# Patient Record
Sex: FEMALE | Race: ASIAN | NOT HISPANIC OR LATINO | URBAN - METROPOLITAN AREA
[De-identification: names, ages, dates, MRNs, and addresses within clinical notes are randomized per-mention and may not be internally consistent; named-entity substitution may affect disease eponyms.]

---

## 2023-05-09 ENCOUNTER — EMERGENCY (EMERGENCY)
Facility: HOSPITAL | Age: 83
LOS: 1 days | Discharge: HOME | End: 2023-05-09
Attending: EMERGENCY MEDICINE | Admitting: EMERGENCY MEDICINE
Payer: MEDICARE

## 2023-05-09 VITALS
DIASTOLIC BLOOD PRESSURE: 71 MMHG | OXYGEN SATURATION: 95 % | HEART RATE: 72 BPM | TEMPERATURE: 98 F | RESPIRATION RATE: 16 BRPM | SYSTOLIC BLOOD PRESSURE: 145 MMHG

## 2023-05-09 VITALS
OXYGEN SATURATION: 94 % | HEART RATE: 69 BPM | WEIGHT: 128.09 LBS | RESPIRATION RATE: 18 BRPM | SYSTOLIC BLOOD PRESSURE: 163 MMHG | DIASTOLIC BLOOD PRESSURE: 83 MMHG | TEMPERATURE: 100 F

## 2023-05-09 DIAGNOSIS — Z87.39 PERSONAL HISTORY OF OTHER DISEASES OF THE MUSCULOSKELETAL SYSTEM AND CONNECTIVE TISSUE: ICD-10-CM

## 2023-05-09 DIAGNOSIS — S32.049A UNSPECIFIED FRACTURE OF FOURTH LUMBAR VERTEBRA, INITIAL ENCOUNTER FOR CLOSED FRACTURE: ICD-10-CM

## 2023-05-09 DIAGNOSIS — S32.019A UNSPECIFIED FRACTURE OF FIRST LUMBAR VERTEBRA, INITIAL ENCOUNTER FOR CLOSED FRACTURE: ICD-10-CM

## 2023-05-09 DIAGNOSIS — F32.A DEPRESSION, UNSPECIFIED: ICD-10-CM

## 2023-05-09 DIAGNOSIS — M54.50 LOW BACK PAIN, UNSPECIFIED: ICD-10-CM

## 2023-05-09 DIAGNOSIS — E03.9 HYPOTHYROIDISM, UNSPECIFIED: ICD-10-CM
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
## 2023-05-09 DIAGNOSIS — R32 UNSPECIFIED URINARY INCONTINENCE: ICD-10-CM
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
## 2023-05-09 DIAGNOSIS — R10.2 PELVIC AND PERINEAL PAIN: ICD-10-CM

## 2023-05-09 DIAGNOSIS — G47.00 INSOMNIA, UNSPECIFIED: ICD-10-CM
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
## 2023-05-09 DIAGNOSIS — R50.9 FEVER, UNSPECIFIED: ICD-10-CM

## 2023-05-09 DIAGNOSIS — Z87.440 PERSONAL HISTORY OF URINARY (TRACT) INFECTIONS: ICD-10-CM

## 2023-05-09 LAB
ALBUMIN SERPL ELPH-MCNC: 3.3 G/DL — SIGNIFICANT CHANGE UP (ref 3.3–5)
ALP SERPL-CCNC: 89 U/L — SIGNIFICANT CHANGE UP (ref 40–120)
ALT FLD-CCNC: 27 U/L — SIGNIFICANT CHANGE UP (ref 10–45)
ANION GAP SERPL CALC-SCNC: 9 MMOL/L — SIGNIFICANT CHANGE UP (ref 5–17)
APPEARANCE UR: ABNORMAL
APTT BLD: 29.2 SEC — SIGNIFICANT CHANGE UP (ref 27.5–35.5)
AST SERPL-CCNC: 18 U/L — SIGNIFICANT CHANGE UP (ref 10–40)
BACTERIA # UR AUTO: ABNORMAL /HPF
BASOPHILS # BLD AUTO: 0.03 K/UL — SIGNIFICANT CHANGE UP (ref 0–0.2)
BASOPHILS NFR BLD AUTO: 0.3 % — SIGNIFICANT CHANGE UP (ref 0–2)
BILIRUB SERPL-MCNC: 0.5 MG/DL — SIGNIFICANT CHANGE UP (ref 0.2–1.2)
BILIRUB UR-MCNC: NEGATIVE — SIGNIFICANT CHANGE UP
BUN SERPL-MCNC: 10 MG/DL — SIGNIFICANT CHANGE UP (ref 7–23)
CALCIUM SERPL-MCNC: 9.2 MG/DL — SIGNIFICANT CHANGE UP (ref 8.4–10.5)
CHLORIDE SERPL-SCNC: 106 MMOL/L — SIGNIFICANT CHANGE UP (ref 96–108)
CO2 SERPL-SCNC: 24 MMOL/L — SIGNIFICANT CHANGE UP (ref 22–31)
COLOR SPEC: YELLOW — SIGNIFICANT CHANGE UP
COMMENT - URINE: SIGNIFICANT CHANGE UP
CREAT SERPL-MCNC: 0.56 MG/DL — SIGNIFICANT CHANGE UP (ref 0.5–1.3)
DIFF PNL FLD: ABNORMAL
EGFR: 92 ML/MIN/1.73M2 — SIGNIFICANT CHANGE UP
EOSINOPHIL # BLD AUTO: 0.01 K/UL — SIGNIFICANT CHANGE UP (ref 0–0.5)
EOSINOPHIL NFR BLD AUTO: 0.1 % — SIGNIFICANT CHANGE UP (ref 0–6)
EPI CELLS # UR: SIGNIFICANT CHANGE UP /HPF (ref 0–5)
GLUCOSE SERPL-MCNC: 173 MG/DL — HIGH (ref 70–99)
GLUCOSE UR QL: NEGATIVE — SIGNIFICANT CHANGE UP
HCT VFR BLD CALC: 37.2 % — SIGNIFICANT CHANGE UP (ref 34.5–45)
HGB BLD-MCNC: 12.7 G/DL — SIGNIFICANT CHANGE UP (ref 11.5–15.5)
IMM GRANULOCYTES NFR BLD AUTO: 0.7 % — SIGNIFICANT CHANGE UP (ref 0–0.9)
INR BLD: 0.97 — SIGNIFICANT CHANGE UP (ref 0.88–1.16)
KETONES UR-MCNC: NEGATIVE — SIGNIFICANT CHANGE UP
LEUKOCYTE ESTERASE UR-ACNC: ABNORMAL
LYMPHOCYTES # BLD AUTO: 0.96 K/UL — LOW (ref 1–3.3)
LYMPHOCYTES # BLD AUTO: 10.7 % — LOW (ref 13–44)
MCHC RBC-ENTMCNC: 28.7 PG — SIGNIFICANT CHANGE UP (ref 27–34)
MCHC RBC-ENTMCNC: 34.1 GM/DL — SIGNIFICANT CHANGE UP (ref 32–36)
MCV RBC AUTO: 84.2 FL — SIGNIFICANT CHANGE UP (ref 80–100)
MONOCYTES # BLD AUTO: 0.44 K/UL — SIGNIFICANT CHANGE UP (ref 0–0.9)
MONOCYTES NFR BLD AUTO: 4.9 % — SIGNIFICANT CHANGE UP (ref 2–14)
NEUTROPHILS # BLD AUTO: 7.45 K/UL — HIGH (ref 1.8–7.4)
NEUTROPHILS NFR BLD AUTO: 83.3 % — HIGH (ref 43–77)
NITRITE UR-MCNC: POSITIVE
NRBC # BLD: 0 /100 WBCS — SIGNIFICANT CHANGE UP (ref 0–0)
PH UR: 6 — SIGNIFICANT CHANGE UP (ref 5–8)
PLATELET # BLD AUTO: 159 K/UL — SIGNIFICANT CHANGE UP (ref 150–400)
POTASSIUM SERPL-MCNC: 3.9 MMOL/L — SIGNIFICANT CHANGE UP (ref 3.5–5.3)
POTASSIUM SERPL-SCNC: 3.9 MMOL/L — SIGNIFICANT CHANGE UP (ref 3.5–5.3)
PROT SERPL-MCNC: 6.5 G/DL — SIGNIFICANT CHANGE UP (ref 6–8.3)
PROT UR-MCNC: 30 MG/DL
PROTHROM AB SERPL-ACNC: 11.5 SEC — SIGNIFICANT CHANGE UP (ref 10.5–13.4)
RBC # BLD: 4.42 M/UL — SIGNIFICANT CHANGE UP (ref 3.8–5.2)
RBC # FLD: 13.1 % — SIGNIFICANT CHANGE UP (ref 10.3–14.5)
RBC CASTS # UR COMP ASSIST: < 5 /HPF — SIGNIFICANT CHANGE UP
SODIUM SERPL-SCNC: 139 MMOL/L — SIGNIFICANT CHANGE UP (ref 135–145)
SP GR SPEC: 1.02 — SIGNIFICANT CHANGE UP (ref 1–1.03)
UROBILINOGEN FLD QL: 0.2 E.U./DL — SIGNIFICANT CHANGE UP
WBC # BLD: 8.95 K/UL — SIGNIFICANT CHANGE UP (ref 3.8–10.5)
WBC # FLD AUTO: 8.95 K/UL — SIGNIFICANT CHANGE UP (ref 3.8–10.5)
WBC UR QL: ABNORMAL /HPF

## 2023-05-09 PROCEDURE — 87086 URINE CULTURE/COLONY COUNT: CPT

## 2023-05-09 PROCEDURE — 73523 X-RAY EXAM HIPS BI 5/> VIEWS: CPT | Mod: 26

## 2023-05-09 PROCEDURE — 36415 COLL VENOUS BLD VENIPUNCTURE: CPT

## 2023-05-09 PROCEDURE — 96374 THER/PROPH/DIAG INJ IV PUSH: CPT

## 2023-05-09 PROCEDURE — 87184 SC STD DISK METHOD PER PLATE: CPT

## 2023-05-09 PROCEDURE — 93005 ELECTROCARDIOGRAM TRACING: CPT

## 2023-05-09 PROCEDURE — 72220 X-RAY EXAM SACRUM TAILBONE: CPT

## 2023-05-09 PROCEDURE — 80053 COMPREHEN METABOLIC PANEL: CPT

## 2023-05-09 PROCEDURE — 72100 X-RAY EXAM L-S SPINE 2/3 VWS: CPT | Mod: 26

## 2023-05-09 PROCEDURE — 85730 THROMBOPLASTIN TIME PARTIAL: CPT

## 2023-05-09 PROCEDURE — 99284 EMERGENCY DEPT VISIT MOD MDM: CPT

## 2023-05-09 PROCEDURE — 73523 X-RAY EXAM HIPS BI 5/> VIEWS: CPT

## 2023-05-09 PROCEDURE — 72100 X-RAY EXAM L-S SPINE 2/3 VWS: CPT

## 2023-05-09 PROCEDURE — 85610 PROTHROMBIN TIME: CPT

## 2023-05-09 PROCEDURE — 82962 GLUCOSE BLOOD TEST: CPT

## 2023-05-09 PROCEDURE — 96375 TX/PRO/DX INJ NEW DRUG ADDON: CPT

## 2023-05-09 PROCEDURE — 87186 SC STD MICRODIL/AGAR DIL: CPT

## 2023-05-09 PROCEDURE — 72220 X-RAY EXAM SACRUM TAILBONE: CPT | Mod: 26

## 2023-05-09 PROCEDURE — 99285 EMERGENCY DEPT VISIT HI MDM: CPT | Mod: 25

## 2023-05-09 PROCEDURE — 85025 COMPLETE CBC W/AUTO DIFF WBC: CPT

## 2023-05-09 PROCEDURE — 81001 URINALYSIS AUTO W/SCOPE: CPT

## 2023-05-09 RX ORDER — CEFPODOXIME PROXETIL 100 MG
1 TABLET ORAL
Qty: 20 | Refills: 0
Start: 2023-05-09 | End: 2023-05-18

## 2023-05-09 RX ORDER — KETOROLAC TROMETHAMINE 30 MG/ML
15 SYRINGE (ML) INJECTION ONCE
Refills: 0 | Status: DISCONTINUED | OUTPATIENT
Start: 2023-05-09 | End: 2023-05-09

## 2023-05-09 RX ORDER — CEFTRIAXONE 500 MG/1
1000 INJECTION, POWDER, FOR SOLUTION INTRAMUSCULAR; INTRAVENOUS ONCE
Refills: 0 | Status: COMPLETED | OUTPATIENT
Start: 2023-05-09 | End: 2023-05-09

## 2023-05-09 RX ADMIN — Medication 15 MILLIGRAM(S): at 14:40

## 2023-05-09 RX ADMIN — CEFTRIAXONE 100 MILLIGRAM(S): 500 INJECTION, POWDER, FOR SOLUTION INTRAMUSCULAR; INTRAVENOUS at 17:05

## 2023-05-09 RX ADMIN — Medication 15 MILLIGRAM(S): at 14:14

## 2023-05-09 NOTE — ED PROVIDER NOTE - CLINICAL SUMMARY MEDICAL DECISION MAKING FREE TEXT BOX
Pt presents s/p mechanical fall last night, p/w buttock / tailbone pain. No AC use. No other complaints nor signs of trauma. Low grade fever on arrival to ED. Denies infectious sx, although notes she was sweating this morning at home, pt thinks 2/2 pain. No known fever at home. Rectal temp in the ED 99.9. Baseline urinary incontinence, uses diaper, risk for UTI. Check labs, UA, XR, analgesia. Dispo pending w/u and clinical status

## 2023-05-09 NOTE — ED PROVIDER NOTE - NSFOLLOWUPCLINICS_GEN_ALL_ED_FT
[facility]  Family Medicine  [address], [address]  [place], [place] [address]  Phone: [phone]  Fax:

## 2023-05-09 NOTE — ED ADULT NURSE REASSESSMENT NOTE - NS ED NURSE REASSESS COMMENT FT1
Pt did ambulation trial. pt was able to stand with three people assist. Pt unable to tolerate ambulation after two steps and pushed back. Pt had extremely unstable gait. Pt did endorse 8/10 pain with activity, MD Buchanan notified and aware. MD at bedside during trial. Pt put back into bed with side rail up and bed locked to lowest position. Ot still A&Ox4 and able to speak in complete sentences. Pt breathing even and unlabored with equal chest rise and fall.

## 2023-05-09 NOTE — ED ADULT NURSE NOTE - OBJECTIVE STATEMENT
Pt arrived accompanied by son. pt endorsed that 5/6/2023 pt took her prescribed daily ambien and was cleaning a table. during the clean pt had a fall and landed on her tailbone. pt denies LOC, landing on head, and injuring other parts of her body. per son pt has been couch ridden since and unable to ambulate due to extreme pain 10/10. pt denies cp, sob, n, v, lightheadedness, dizziness, blurry vision. Pt A&Ox4. Able to speak in complete sentences. Pt lung sounds expiratory wheeze. pt upper body extremity strength 4/5. pt RLE 3/5 w/ ability to fully extend leg. pt LLE externally rotated and pt unable to fully extend leg and shouting with pain. pt also endorsed being on abx for chronic UTI.

## 2023-05-09 NOTE — ED PROVIDER NOTE - NS ED MD DISPO DISCHARGE CCDA
steady gait NAD/Alert and oriented to person, place and time
Patient/Caregiver provided printed discharge information.

## 2023-05-09 NOTE — ED PROVIDER NOTE - PHYSICAL EXAMINATION
Constitutional: Well appearing, well nourished, awake, alert, oriented to person, place, time/situation and in no apparent distress.  Head atraumatic, normocephalic. No signs of trauma  ENMT: Airway patent. Normal MM. NO Hudson signs nor Raccoon's eyes  Eyes: Clear bilaterally, PERRL, EOMI  Chest: no trauma  Cardiac: Normal rate, regular rhythm.  Heart sounds S1, S2.  No murmurs, rubs or gallops.  Respiratory: Breaths sounds equal and clear b/l. No increased WOB, tachypnea, hypoxia, or accessory mm use. Pt speaks in full sentences.   Gastrointestinal: Abd soft, NT, ND, NABS. No guarding, rebound, or rigidity. No pulsatile abdominal masses. No organomegaly appreciated. no trauma  Musculoskeletal: No midline spinal ttp throughout the spine. FROM neck w/o midline pain. Pelvis stable. + mild midline ttp lower lumbar / sacral / coccygeal. FROM all joints x 4 ext w/o dec ROM, or signs of trauma. Reports b/l pelvic / groin pain w/ ROM legs / hips, w/o dec ROM  Neuro: Alert and oriented x 3, face symmetric. Strength 5/5 x 4 ext and symmetric, nml gross motor movement, nml gait. No focal deficits noted.   Skin: Skin normal color for race, warm, dry and intact. No evidence of rash.  Psych: Alert and oriented to person, place, time/situation. normal mood and affect.

## 2023-05-09 NOTE — ED PROVIDER NOTE - PATIENT PORTAL LINK FT
You can access the FollowMyHealth Patient Portal offered by [facility] by registering at the following website: [email or web address]. By joining [facility]’s FollowMyHealth portal, you will also be able to view your health information using other applications (apps) compatible with our system.

## 2023-05-09 NOTE — ED ADULT NURSE REASSESSMENT NOTE - NS ED NURSE REASSESS COMMENT FT1
Pt pain improved post toradol administration. Pt able to move BLE 4/5 w/o screaming in pain. Pt A&ox4 and able to speak in complete sentences. breathing even and unlabored with equal chest rise and fall. bed locked in lowest position, call light in reach, fall precautions still being implemented. Pending further lab results at this time.

## 2023-05-09 NOTE — ED PROVIDER NOTE - NSFOLLOWUPINSTRUCTIONS_ED_ALL_ED_FT
You were evaluated in the ED after a fall. You have compression fractures in the lumbar spine. This is treated with conservative management (pain control, weight bearing as tolerated). You can alternate Tylenol, with Motrin, staggering the two medications every 3 hours. For example, Tylenol at 9am, followed by Motrin at 12pm, followed by Tylenol at 3pm, etc.     You also have a urinary tract infection. The treatment is antibiotics. You received the first dose here in the ED, in the IV (Ceftriaxone), and a prescription has been sent to your pharmacy, to take over the next 10 days (Cefpodoxime, both similar to Penicillin). You may start the prescription TOMORROW.    You were offered admission for pain control and possible rehab placement while you heal. You have declined admission. If you have uncontrolled pain, recurrent falls, or worsening / concerning symptoms, return to the ED for re-evaluation.     Spinal Compression Fracture    A spinal compression fracture is a collapse of the bones that form the spine (vertebrae). With this type of fracture, the vertebrae become pushed (compressed) into a wedge shape. Most compression fractures happen in the middle or lower part of the spine.    What are the causes?  This condition may be caused by:  Thinning and loss of density in the bones (osteoporosis). This is the most common cause.  A fall.  A car or motorcycle accident.  Cancer.  Trauma, such as a heavy, direct hit to the head or back.  What increases the risk?  You are more likely to develop this condition if:  You are 60 years of age or older.  You have osteoporosis.  You have certain types of cancer, including:  Multiple myeloma.  Lymphoma.  Prostate cancer.  Lung cancer.  Breast cancer.  What are the signs or symptoms?  Symptoms of this condition include:  Severe pain with simple movements such as coughing or sneezing.  Pain that gets worse over time.  Pain that is worse when you stand, walk, sit, or bend.  Sudden pain that is so bad that it is hard for you to move.  Bending or humping of the spine.  Gradual loss of height.  Numbness, tingling, or weakness in the back and legs.  Trouble walking.  Your symptoms will depend on the cause of the fracture and how quickly it develops.    How is this diagnosed?  This condition may be diagnosed based on symptoms, medical history, and a physical exam. During the physical exam, your health care provider may tap along the length of your spine to check for tenderness. Tests may be done to confirm the diagnosis. They may include:  A bone mineral density test to check for osteoporosis.  Imaging tests, such as a spine X-ray, CT scan, or MRI.  How is this treated?  Treatment depends on the cause and severity of the condition. Some fractures may heal on their own with supportive care. Treatment may include:  Pain medicine.  Rest.  A back brace.  Physical therapy exercises.  Medicine to strengthen bone.  Calcium and vitamin D supplements.  Fractures that cause the back to become misshapen, cause nerve pain or weakness, or do not respond to other treatment may be treated with surgery. This may include:  Vertebroplasty. Bone cement is injected into the collapsed vertebrae to stabilize them.  Balloon kyphoplasty. The collapsed vertebrae are expanded with a balloon and then bone cement is injected into them.  Spinal fusion. The collapsed vertebrae are connected (fused) to normal vertebrae.  Follow these instructions at home:  Medicines    Take over-the-counter and prescription medicines only as told by your health care provider.  Ask your health care provider if the medicine prescribed to you:  Requires you to avoid driving or using machinery.  Can cause constipation. You may need to take these actions to prevent or treat constipation:  Drink enough fluid to keep your urine pale yellow.  Take over-the-counter or prescription medicines.  Eat foods that are high in fiber, such as beans, whole grains, and fresh fruits and vegetables.  Limit foods that are high in fat and processed sugars, such as fried or sweet foods.  If you have a brace:    Wear the brace as told by your health care provider. Remove it only as told by your health care provider.  Loosen the brace if your fingers or toes tingle, become numb, or turn cold and blue.  Keep the brace clean.  If the brace is not waterproof:  Do not let it get wet.  Cover it with a watertight covering when you take a bath or a shower.  Managing pain, stiffness, and swelling      If directed, put ice on the injured area. To do this:  If you have a removable brace, remove it as told by your health care provider.  Put ice in a plastic bag.  Place a towel between your skin and the bag.  Leave the ice on for 20 minutes, 2–3 times a day.  Remove the ice if your skin turns bright red. This is very important. If you cannot feel pain, heat, or cold, you have a greater risk of damage to the area.  Activity    Rest as told by your health care provider.  Avoid sitting for a long time without moving. Get up to take short walks every 1–2 hours. This is important to improve blood flow and breathing. Ask for help if you feel weak or unsteady.  Return to your normal activities as told by your health care provider. Ask what activities are safe for you.  Do physical therapy exercises to improve movement and strength in your back, as recommended by your health care provider.  Exercise regularly as directed by your health care provider.  General instructions      Do not drink alcohol. Alcohol can interfere with your treatment.  Do not use any products that contain nicotine or tobacco, such as cigarettes, e-cigarettes, and chewing tobacco. These can delay bone healing. If you need help quitting, ask your health care provider.  Keep all follow-up visits. This is important. It can help to prevent permanent injury, disability, and long-lasting (chronic) pain.  Contact a health care provider if:  You have a fever.  Your pain medicine is not helping.  Your pain does not get better over time.  You cannot return to your normal activities as planned or expected.  Get help right away if:  Your pain is very bad and it suddenly gets worse.  You are unable to move any body part (paralysis) that is below the level of your injury.  You have numbness, tingling, or weakness in any body part that is below the level of your injury.  You cannot control your bladder or bowels.  Summary  A spinal compression fracture is a collapse of the bones that form the spine (vertebrae).  With this type of fracture, the vertebrae become pushed (compressed) into a wedge shape.  Your symptoms and treatment will depend on the cause and severity of the fracture and how quickly it develops.  Some fractures may heal on their own with supportive care. Fractures that cause the back to become misshapen, cause nerve pain or weakness, or do not respond to other treatment may be treated with surgery.  This information is not intended to replace advice given to you by your health care provider. Make sure you discuss any questions you have with your health care provider.    Urinary Tract Infection    A urinary tract infection (UTI) is an infection of any part of the urinary tract, which includes the kidneys, ureters, bladder, and urethra. Risk factors include ignoring your need to urinate, wiping back to front if female, being an uncircumcised male, and having diabetes or a weak immune system. Symptoms include frequent urination, pain or burning with urination, foul smelling urine, cloudy urine, pain in the lower abdomen, blood in the urine, and fever. If you were prescribed an antibiotic medicine, take it as told by your health care provider. Do not stop taking the antibiotic even if you start to feel better.    SEEK IMMEDIATE MEDICAL CARE IF YOU HAVE ANY OF THE FOLLOWING SYMPTOMS: severe back or abdominal pain, fever, inability to keep fluids or medicine down, dizziness/lightheadedness, or a change in mental status.

## 2023-05-09 NOTE — ED ADULT TRIAGE NOTE - CHIEF COMPLAINT QUOTE
BIBEMS, from home. As per son, patient fell in the house around 12AM last night, complaining right lower back pain. Denies loc or head injury. Patient currently on medication for a uti. Hx of hypothyroid , depression.

## 2023-05-09 NOTE — ED PROVIDER NOTE - OBJECTIVE STATEMENT
Pt w/ PMHx baseline urinary incontinence on methenamine, hypothyroidism on synthroid, ? early Alzheimer's on Donepezil, depression on Sertraline, insomnia on Ambien + Melatonin, PSHx L ankle surgery presents s/p "tailbone pain" s/p fall last night. She states she takes Ambien every night to help her sleep. At approx 2240 she took 5 mg and 2 Melatonin gummies, unknown dose. She states she was cleaning a table, split water, slipped on the water, and fell onto her tailbone. NO head inj or LOC. She was helped into bed. She was given Ibuprofen last night for pain. This morning she was in a lot of pain, received Tylenol 500 mg at approx 10 am, and no additional analgesia. She had sig pain prompting the ED visit. She normally walks with a cane. She has not bore weight today. No AC use

## 2023-05-11 LAB
-  AMPICILLIN/SULBACTAM: SIGNIFICANT CHANGE UP
-  AMPICILLIN: SIGNIFICANT CHANGE UP
-  CEFAZOLIN: SIGNIFICANT CHANGE UP
-  CEFTRIAXONE: SIGNIFICANT CHANGE UP
-  CIPROFLOXACIN: SIGNIFICANT CHANGE UP
-  ERTAPENEM: SIGNIFICANT CHANGE UP
-  GENTAMICIN: SIGNIFICANT CHANGE UP
-  NITROFURANTOIN: SIGNIFICANT CHANGE UP
-  PIPERACILLIN/TAZOBACTAM: SIGNIFICANT CHANGE UP
-  TOBRAMYCIN: SIGNIFICANT CHANGE UP
-  TRIMETHOPRIM/SULFAMETHOXAZOLE: SIGNIFICANT CHANGE UP
METHOD TYPE: SIGNIFICANT CHANGE UP
METHOD TYPE: SIGNIFICANT CHANGE UP

## 2023-05-12 LAB
-  CEFAZOLIN: SIGNIFICANT CHANGE UP
CULTURE RESULTS: SIGNIFICANT CHANGE UP
METHOD TYPE: SIGNIFICANT CHANGE UP
METHOD TYPE: SIGNIFICANT CHANGE UP
ORGANISM # SPEC MICROSCOPIC CNT: SIGNIFICANT CHANGE UP
SPECIMEN SOURCE: SIGNIFICANT CHANGE UP

## 2023-08-02 PROBLEM — N39.0 URINARY TRACT INFECTION, SITE NOT SPECIFIED: Chronic | Status: ACTIVE | Noted: 2023-05-09

## 2023-09-01 ENCOUNTER — INPATIENT (INPATIENT)
Facility: HOSPITAL | Age: 83
LOS: 4 days | Discharge: NURSING, REHAB OR LONG-TERM CARE | DRG: 521 | End: 2023-09-06
Attending: ORTHOPAEDIC SURGERY | Admitting: ORTHOPAEDIC SURGERY
Payer: MEDICARE

## 2023-09-01 VITALS
HEART RATE: 89 BPM | RESPIRATION RATE: 18 BRPM | DIASTOLIC BLOOD PRESSURE: 88 MMHG | SYSTOLIC BLOOD PRESSURE: 178 MMHG | WEIGHT: 121.92 LBS | TEMPERATURE: 98 F | OXYGEN SATURATION: 94 %

## 2023-09-01 DIAGNOSIS — M25.552 PAIN IN LEFT HIP: ICD-10-CM

## 2023-09-01 DIAGNOSIS — D62 ACUTE POSTHEMORRHAGIC ANEMIA: ICD-10-CM

## 2023-09-01 DIAGNOSIS — G93.41 METABOLIC ENCEPHALOPATHY: ICD-10-CM

## 2023-09-01 DIAGNOSIS — E87.1 HYPO-OSMOLALITY AND HYPONATREMIA: ICD-10-CM

## 2023-09-01 DIAGNOSIS — F41.9 ANXIETY DISORDER, UNSPECIFIED: ICD-10-CM

## 2023-09-01 DIAGNOSIS — Z79.890 HORMONE REPLACEMENT THERAPY: ICD-10-CM
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
## 2023-09-01 DIAGNOSIS — R01.1 CARDIAC MURMUR, UNSPECIFIED: ICD-10-CM

## 2023-09-01 DIAGNOSIS — E03.9 HYPOTHYROIDISM, UNSPECIFIED: ICD-10-CM

## 2023-09-01 DIAGNOSIS — S72.002A FRACTURE OF UNSPECIFIED PART OF NECK OF LEFT FEMUR, INITIAL ENCOUNTER FOR CLOSED FRACTURE: ICD-10-CM
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
## 2023-09-01 DIAGNOSIS — R03.0 ELEVATED BLOOD-PRESSURE READING, WITHOUT DIAGNOSIS OF HYPERTENSION: ICD-10-CM

## 2023-09-01 DIAGNOSIS — M81.0 AGE-RELATED OSTEOPOROSIS WITHOUT CURRENT PATHOLOGICAL FRACTURE: ICD-10-CM

## 2023-09-01 LAB
ANION GAP SERPL CALC-SCNC: 10 MMOL/L — SIGNIFICANT CHANGE UP (ref 5–17)
APTT BLD: 31.8 SEC — SIGNIFICANT CHANGE UP (ref 24.5–35.6)
BLD GP AB SCN SERPL QL: NEGATIVE — SIGNIFICANT CHANGE UP
BUN SERPL-MCNC: 7 MG/DL — SIGNIFICANT CHANGE UP (ref 7–23)
CALCIUM SERPL-MCNC: 9.8 MG/DL — SIGNIFICANT CHANGE UP (ref 8.4–10.5)
CHLORIDE SERPL-SCNC: 98 MMOL/L — SIGNIFICANT CHANGE UP (ref 96–108)
CO2 SERPL-SCNC: 24 MMOL/L — SIGNIFICANT CHANGE UP (ref 22–31)
CREAT SERPL-MCNC: 0.49 MG/DL — LOW (ref 0.5–1.3)
EGFR: 94 ML/MIN/1.73M2 — SIGNIFICANT CHANGE UP
GLUCOSE SERPL-MCNC: 162 MG/DL — HIGH (ref 70–99)
HCT VFR BLD CALC: 35.9 % — SIGNIFICANT CHANGE UP (ref 34.5–45)
HGB BLD-MCNC: 12.1 G/DL — SIGNIFICANT CHANGE UP (ref 11.5–15.5)
INR BLD: 0.86 — SIGNIFICANT CHANGE UP (ref 0.85–1.18)
MCHC RBC-ENTMCNC: 29.2 PG — SIGNIFICANT CHANGE UP (ref 27–34)
MCHC RBC-ENTMCNC: 33.7 GM/DL — SIGNIFICANT CHANGE UP (ref 32–36)
MCV RBC AUTO: 86.7 FL — SIGNIFICANT CHANGE UP (ref 80–100)
NRBC # BLD: 0 /100 WBCS — SIGNIFICANT CHANGE UP (ref 0–0)
PLATELET # BLD AUTO: 222 K/UL — SIGNIFICANT CHANGE UP (ref 150–400)
POTASSIUM SERPL-MCNC: 3.9 MMOL/L — SIGNIFICANT CHANGE UP (ref 3.5–5.3)
POTASSIUM SERPL-SCNC: 3.9 MMOL/L — SIGNIFICANT CHANGE UP (ref 3.5–5.3)
PROTHROM AB SERPL-ACNC: 9.9 SEC — SIGNIFICANT CHANGE UP (ref 9.5–13)
RBC # BLD: 4.14 M/UL — SIGNIFICANT CHANGE UP (ref 3.8–5.2)
RBC # FLD: 12.9 % — SIGNIFICANT CHANGE UP (ref 10.3–14.5)
RH IG SCN BLD-IMP: POSITIVE — SIGNIFICANT CHANGE UP
SODIUM SERPL-SCNC: 132 MMOL/L — LOW (ref 135–145)
WBC # BLD: 7.09 K/UL — SIGNIFICANT CHANGE UP (ref 3.8–10.5)
WBC # FLD AUTO: 7.09 K/UL — SIGNIFICANT CHANGE UP (ref 3.8–10.5)

## 2023-09-01 PROCEDURE — 71045 X-RAY EXAM CHEST 1 VIEW: CPT | Mod: 26

## 2023-09-01 PROCEDURE — 99285 EMERGENCY DEPT VISIT HI MDM: CPT

## 2023-09-01 PROCEDURE — 73502 X-RAY EXAM HIP UNI 2-3 VIEWS: CPT | Mod: 26,LT

## 2023-09-01 PROCEDURE — 99223 1ST HOSP IP/OBS HIGH 75: CPT

## 2023-09-01 PROCEDURE — 99222 1ST HOSP IP/OBS MODERATE 55: CPT

## 2023-09-01 PROCEDURE — 73552 X-RAY EXAM OF FEMUR 2/>: CPT | Mod: 26,LT

## 2023-09-01 RX ORDER — MORPHINE SULFATE 50 MG/1
4 CAPSULE, EXTENDED RELEASE ORAL ONCE
Refills: 0 | Status: DISCONTINUED | OUTPATIENT
Start: 2023-09-01 | End: 2023-09-01

## 2023-09-01 RX ORDER — KETOROLAC TROMETHAMINE 30 MG/ML
15 SYRINGE (ML) INJECTION ONCE
Refills: 0 | Status: DISCONTINUED | OUTPATIENT
Start: 2023-09-01 | End: 2023-09-01

## 2023-09-01 RX ORDER — SODIUM CHLORIDE 9 MG/ML
500 INJECTION, SOLUTION INTRAVENOUS
Refills: 0 | Status: DISCONTINUED | OUTPATIENT
Start: 2023-09-01 | End: 2023-09-01

## 2023-09-01 RX ADMIN — SODIUM CHLORIDE 70 MILLILITER(S): 9 INJECTION, SOLUTION INTRAVENOUS at 23:37

## 2023-09-01 RX ADMIN — Medication 15 MILLIGRAM(S): at 21:38

## 2023-09-01 RX ADMIN — MORPHINE SULFATE 4 MILLIGRAM(S): 50 CAPSULE, EXTENDED RELEASE ORAL at 21:00

## 2023-09-01 NOTE — H&P ADULT - HISTORY OF PRESENT ILLNESS
83F otherwise no PMH presenting with left femoral neck fracture Garden IV s/p mechanical fall down the hill today. Denies head strike/loss of consciousness.

## 2023-09-01 NOTE — ED PROVIDER NOTE - OBJECTIVE STATEMENT
84 yo F w no PMH p/w L hip pain s/p mechanical fall today. Pt was walking downhill and became unsteady, fell to her L side landing on her hip. No head injury, no LOC. She was unable to stand after fall. EMS was called and brought her in from the scene of fall. She is not on blood thinners. In pain in the ED, not moving hip.

## 2023-09-01 NOTE — ED ADULT NURSE NOTE - NSFALLRISKINTERV_ED_ALL_ED
Assistance OOB with selected safe patient handling equipment if applicable/Assistance with ambulation/Communicate fall risk and risk factors to all staff, patient, and family/Monitor gait and stability/Provide visual cue: yellow wristband, yellow gown, etc/Reinforce activity limits and safety measures with patient and family/Call bell, personal items and telephone in reach/Instruct patient to call for assistance before getting out of bed/chair/stretcher/Non-slip footwear applied when patient is off stretcher/[place] to call system/Physically safe environment - no spills, clutter or unnecessary equipment/Purposeful Proactive Rounding/Room/bathroom lighting operational, light cord in reach

## 2023-09-01 NOTE — ED ADULT NURSE NOTE - OBJECTIVE STATEMENT
Pt is 83F BIBEMS c/o L hip pain s/p mechanical trip and fall. Circulation, motor function, sensation intact to BLEs, +2 DP pulses bilaterally. Pt laying on R side unable to put pressure on L hip. Skin intact over L hip area

## 2023-09-01 NOTE — ED PROVIDER NOTE - NS ED ROS FT
CONSTITUTIONAL: No fever, no chills, no fatigue  EYES: No eye redness, no visual changes  ENT: No ear pain, no sore throat  CARDIOVASCULAR: No chest pain, no palpitations  RESPIRATORY: No cough, no SOB  GI: No abdominal pain, no nausea, no vomiting, no constipation, no diarrhea  GENITOURINARY: No dysuria, no frequency, no hematuria  MUSCULOSKELETAL: No back pain, + hip pain, no myalgias  SKIN: No rash, no peripheral edema  NEURO: No headache, no confusion    ALL OTHER SYSTEMS NEGATIVE.

## 2023-09-01 NOTE — CONSULT NOTE ADULT - SUBJECTIVE AND OBJECTIVE BOX
Patient is a 83y old  Female who presents with a chief complaint of L femoral neck fracture (01 Sep 2023 22:55)      HPI:  83F otherwise no PMH presenting with left femoral neck fracture Garden IV s/p mechanical fall down the hill today. Denies head strike/loss of consciousness. (01 Sep 2023 22:55)      REVIEW OF SYSTEMS: see HPI    PAST MEDICAL & SURGICAL HISTORY:  Chronic UTI          FAMILY HISTORY:    SOCIAL HISTORY:  Tobacco use:  EtOH use:  Recreational drug use:    MEDICATIONS:  MEDICATIONS  (STANDING):    MEDICATIONS  (PRN):      ALLERGIES:  Allergies    No Known Allergies    Intolerances        VITAL SIGNS:  Vital Signs Last 24 Hrs  T(C): 36.7 (01 Sep 2023 20:09), Max: 36.7 (01 Sep 2023 20:09)  T(F): 98 (01 Sep 2023 20:09), Max: 98 (01 Sep 2023 20:09)  HR: 82 (01 Sep 2023 21:52) (82 - 89)  BP: 168/82 (01 Sep 2023 21:52) (168/82 - 178/88)  RR: 18 (01 Sep 2023 20:09) (18 - 18)  SpO2: 94% (01 Sep 2023 20:09) (94% - 94%)    Parameters below as of 01 Sep 2023 20:09  Patient On (Oxygen Delivery Method): room air      PHYSICAL EXAM:  Constitutional: resting comfortably in bed; NAD  Head: NC/AT  Eyes: PERRL, EOMI, anicteric sclera  ENT: no nasal discharge; uvula midline, no oropharyngeal erythema or exudates; MMM  Neck: supple; no JVD or thyromegaly  Respiratory: CTA B/L; no W/R/R, no retractions  Cardiac: +S1/S2; RRR; no M/R/G  Gastrointestinal: abdomen soft, NT/ND; no rebound or guarding; +BS  Genitourinary: normal external genitalia  Extremities: WWP, no clubbing or cyanosis; no peripheral edema  Musculoskeletal: NROM x4; no joint swelling, tenderness or erythema  Vascular: 2+ radial, femoral, DP/PT pulses B/L  Dermatologic: skin warm, dry and intact; no rashes, wounds, or scars  Lymphatic: no submandibular or cervical LAD  Neurologic: AAOx3; CNII-XII grossly intact; no focal deficits  Psychiatric: affect and characteristics of appearance, verbalizations, behaviors are appropriate    LABS:                        12.1   7.09  )-----------( 222      ( 01 Sep 2023 20:36 )             35.9     09-01    132<L>  |  98  |  7   ----------------------------<  162<H>  3.9   |  24  |  0.49<L>    Ca    9.8      01 Sep 2023 20:36      PT/INR - ( 01 Sep 2023 20:36 )   PT: 9.9 sec;   INR: 0.86          PTT - ( 01 Sep 2023 20:36 )  PTT:31.8 sec  Urinalysis Basic - ( 01 Sep 2023 20:36 )    Color: x / Appearance: x / SG: x / pH: x  Gluc: 162 mg/dL / Ketone: x  / Bili: x / Urobili: x   Blood: x / Protein: x / Nitrite: x   Leuk Esterase: x / RBC: x / WBC x   Sq Epi: x / Non Sq Epi: x / Bacteria: x      RADIOLOGY & ADDITIONAL TESTS: Reviewed Patient is a 83y old  Female who presents with a chief complaint of L femoral neck fracture (01 Sep 2023 22:55)      HPI:  The patient is a 84yo female with PMHx of hypothyroidism and anxiety who presents to the ED with left hip pain after a mechanical fall. History obtained from patient and two sons Chapo and Uriel at bedside. Patient was walking down a steep hill and began to run but then suddenly lost balance and fell onto her buttocks. It was too painful for her to get up and walk; she was unable to bear weight on the RLE. Denies hitting head, HA, dizziness, lightheadedness, syncope, LOC.    Patient was fully independent up until 2 months ago, when she had a different mechanical fall and fell on her lower back. Since then, she is limited in her functionality due to back pain. She now needs help getting in and out of the tub, which her  helps with. She was able to walk many blocks (>3) without SOB before this fall, but now is limited to 1-2 blocks because of lower back pain.     No history of HTN, DM, CAD, MI, strokes, kidney or liver disease. Has known cardiac murmur, for over 10-20 years. Had echo done years ago but is unsure of the results. Unsure if she ever saw cardiology. Denies chest pain, palpitations, SOB, cough, leg swelling. Only takes Synthroid and Zoloft daily. Occasionally takes Tylenol for back pain. No NSAID use.       REVIEW OF SYSTEMS: see HPI    PAST MEDICAL HISTORY: hypothyroidism, anxiety    SOCIAL HISTORY: Lives with her  who helps her with partial ADLs  Tobacco use: never  EtOH use: never  Recreational drug use: never    MEDICATIONS: Synthroid & Zoloft    ALLERGIES: No Known Allergies        VITAL SIGNS:  Vital Signs Last 24 Hrs  T(C): 36.7 (01 Sep 2023 20:09), Max: 36.7 (01 Sep 2023 20:09)  T(F): 98 (01 Sep 2023 20:09), Max: 98 (01 Sep 2023 20:09)  HR: 82 (01 Sep 2023 21:52) (82 - 89)  BP: 168/82 (01 Sep 2023 21:52) (168/82 - 178/88)  RR: 18 (01 Sep 2023 20:09) (18 - 18)  SpO2: 94% (01 Sep 2023 20:09) (94% - 94%)    Parameters below as of 01 Sep 2023 20:09  Patient On (Oxygen Delivery Method): room air      PHYSICAL EXAM:  Constitutional: pleasant elderly female resting comfortably laying flat in bed; NAD  Head: NC/AT  Eyes: PERRL, EOMI, anicteric sclera  ENT: no nasal discharge; uvula midline, no oropharyngeal erythema or exudates; MMM  Neck: supple; no JVD or thyromegaly  Respiratory: CTA B/L; no W/R/R, no retractions  Cardiac: +S1/S2; RRR; +Crescendo systolic murmur best heard at LUSB, radiating to carotids  Gastrointestinal: abdomen soft, NT/ND; no rebound or guarding; +BS  Extremities: WWP, no clubbing or cyanosis; trace pedal edema of RLE  Musculoskeletal: RLE ROM limited due to pain, intact b/l UE and LLE ROM  Vascular: 2+ radial, DP/PT pulses B/L  Dermatologic: skin warm, dry and intact; no rashes, wounds, or scars  Neurologic: AAOx3; CNII-XII grossly intact; no focal deficits  Psychiatric: affect and characteristics of appearance, verbalizations, behaviors are appropriate    LABS:                        12.1   7.09  )-----------( 222      ( 01 Sep 2023 20:36 )             35.9     09-01    132<L>  |  98  |  7   ----------------------------<  162<H>  3.9   |  24  |  0.49<L>    Ca    9.8      01 Sep 2023 20:36      PT/INR - ( 01 Sep 2023 20:36 )   PT: 9.9 sec;   INR: 0.86          PTT - ( 01 Sep 2023 20:36 )  PTT:31.8 sec  Urinalysis Basic - ( 01 Sep 2023 20:36 )    Color: x / Appearance: x / SG: x / pH: x  Gluc: 162 mg/dL / Ketone: x  / Bili: x / Urobili: x   Blood: x / Protein: x / Nitrite: x   Leuk Esterase: x / RBC: x / WBC x   Sq Epi: x / Non Sq Epi: x / Bacteria: x      RADIOLOGY & ADDITIONAL TESTS: Reviewed

## 2023-09-01 NOTE — ED PROVIDER NOTE - CLINICAL SUMMARY MEDICAL DECISION MAKING FREE TEXT BOX
Likely hip fracture d/t mechanical fall today. No other injuries.  Will xray hip, obtain pre-op labs, ekg, CXR. Pain control.  Ortho consult.

## 2023-09-01 NOTE — CONSULT NOTE ADULT - ASSESSMENT
Mrs. León is a pleasant 82yo female with PMHx of hypothyroidism and anxiety/depression who presents to the ED with left hip pain after a mechanical fall. Found to have left femoral neck fracture, admitted to orthopedic surgery for L hip hemiarthroplasty. Medicine consulted for preop clearance.     #Pre-operative clearance  - Preop labs (CBC, CMP, PT, PTT, INR, T/S)  - EKG: NSR at 70bpm, QTc 432, no TWI or ST changes  - CXR: rotated. no acute findings such as consolidations, effusions vascular congestion, or pulm edema  - METS >4  - RCRI score 0, Class I Risk, 3.9% 30-day risk of death, MI, or cardiac arrest  - Deleon score 0.2% Risk of myocardial infarction or cardiac arrest, intraoperatively or up to 30 days post-op  - No further cardiac workup indicated.    Patient deemed intermediate risk for high risk procedure.       Mrs. León is a pleasant 82yo female with PMHx of hypothyroidism and anxiety/depression who presents to the ED with left hip pain after a mechanical fall. Found to have left femoral neck fracture, admitted to orthopedic surgery for L hip hemiarthroplasty. Medicine consulted for preop clearance.     #Pre-operative clearance  - Preop labs (CBC, CMP, PT, PTT, INR, T/S)  - EKG: NSR at 70bpm, QTc 432, no TWI or ST changes  - CXR: rotated. no acute findings such as consolidations, effusions vascular congestion, or pulm edema  - METS >4  - RCRI score 0, Class I Risk, 3.9% 30-day risk of death, MI, or cardiac arrest  - Deleon score 0.2% Risk of myocardial infarction or cardiac arrest, intraoperatively or up to 30 days post-op  - No further cardiac workup indicated.    Patient deemed intermediate risk for high risk procedure.      INCOMPLETE pending attending signature.  Mrs. León is a pleasant 84yo female with PMHx of hypothyroidism and anxiety who presents to the ED with left hip pain after a mechanical fall. Found to have left femoral neck fracture, admitted to orthopedic surgery for L hip hemiarthroplasty. Medicine consulted for preop clearance.     #Pre-operative clearance  - Preop labs (CBC, CMP, PT, PTT, INR, T/S)  - EKG: NSR at 70bpm, QTc 432, no TWI or ST changes  - CXR: rotated. no acute findings such as consolidations, effusions vascular congestion, or pulm edema  - Moderate METS >4  - RCRI score 0, Class I Risk, 3.9% 30-day risk of death, MI, or cardiac arrest  - Deleon score 0.4% Risk of myocardial infarction or cardiac arrest, intraoperatively or up to 30 days post-op  - No further cardiac workup indicated.    Patient deemed intermediate risk for high risk procedure.      INCOMPLETE pending attending signature.  Mrs. León is a pleasant 82yo female with PMHx of hypothyroidism and anxiety who presents to the ED with left hip pain after a mechanical fall. Found to have left femoral neck fracture, admitted to orthopedic surgery for L hip hemiarthroplasty. Medicine consulted for preop clearance.     #Pre-operative clearance  - Preop labs remarkable for elevated serum glucose and Na 132  - EKG: NSR at 70bpm, QTc 432, no TWI or ST changes  - CXR: rotated. no acute findings such as consolidations, effusions vascular congestion, or pulm edema  - METS <4, limited functionality due to age and chronic pain from prior fall  - RCRI score 0, Class I Risk, 3.9% 30-day risk of death, MI, or cardiac arrest  - Deleon score 0.4% Risk of myocardial infarction or cardiac arrest, intraoperatively or up to 30 days post-op  - No further cardiac workup indicated.    Patient deemed intermediate risk for intermediate risk procedure.    #Hypothyroidism  Takes Synthroid daily, unsure of the dose.   - F/u TSH in AM  - Please perform official med rec in AM; ask son to bring in meds    #Anxiety  Takes Zoloft daily unsure of the dose.   - Please perform official med rec in AM; ask son to bring in meds    #Elevated BP  SBP elevated to 160-180s with DBP 80-100s. No prior hx of HTN. Likely in the setting of acute pain.  - Pain control per primary team  - If continues to be elevated despite adequate pain control, will consider starting antihypertensives

## 2023-09-01 NOTE — CONSULT NOTE ADULT - ATTENDING COMMENTS
#Preop: EKG nsr, non ischemic. +known murmur on exam w/ previous tte wnl, euvolemic on exam. METs <4 since fall 2 months ago; limited due to chronic back/hip pain, denies CP/LONG. RCRI 0, Class 1 risk. Therefore no further work up indicated. Pt is intermediate risk for intermediate risk procedure.    #Elevated BP: in setting of pain. No hx of HTN. c/w pain control. Consider Amlodipine if remains elevated.

## 2023-09-01 NOTE — ED PROVIDER NOTE - PHYSICAL EXAMINATION
CONSTITUTIONAL: Non-toxic; in no apparent distress  HEAD: Normocephalic; atraumatic  EYES: PERRL; EOM intact   ENMT: External appears normal  NECK: Supple; non-tender  CARD: Normal S1, S2; no murmurs, rubs, or gallops  RESP: Normal chest excursion with respiration; breath sounds clear and equal bilaterally  ABD: Soft, non-distended; non-tender  EXT: + L hip pain w ROM, limited ROM of hip, TTP of L hip, no peripheral edema, no erythema, WWP, cap refill <2s, DP and PT pulses 2+ BL   SKIN: Warm, dry, no rash  NEURO:  No focal neurological deficiencies, sensation intact throughout

## 2023-09-01 NOTE — H&P ADULT - ASSESSMENT
83F no PMH s/p mechanical fall down the hill with L FNF IV  -Pain control  -Admit to ortho   -NPO/fluids/labs/preops  -OR tomorrow for hip zion    Pending discussion with Dr. Arita 83F no PMH s/p mechanical fall down the hill with L FNF IV  -Pain control  -Admit to ortho   -NPO/fluids/labs/preops  -OR Sunday for hip zion    Discussed with Dr. Arita 83F no PMH s/p mechanical fall down the hill with L FNF IV  -Pain control  -Admit to ortho   -labs/preops  -NPO/Fluids on Sat midnight  -OR Sunday for hip zion    Discussed with Dr. Arita

## 2023-09-02 ENCOUNTER — TRANSCRIPTION ENCOUNTER (OUTPATIENT)
Age: 83
End: 2023-09-02

## 2023-09-02 LAB
ANION GAP SERPL CALC-SCNC: 10 MMOL/L — SIGNIFICANT CHANGE UP (ref 5–17)
APPEARANCE UR: CLEAR — SIGNIFICANT CHANGE UP
BACTERIA # UR AUTO: PRESENT /HPF
BILIRUB UR-MCNC: NEGATIVE — SIGNIFICANT CHANGE UP
BLD GP AB SCN SERPL QL: NEGATIVE — SIGNIFICANT CHANGE UP
BUN SERPL-MCNC: 7 MG/DL — SIGNIFICANT CHANGE UP (ref 7–23)
CALCIUM SERPL-MCNC: 9.5 MG/DL — SIGNIFICANT CHANGE UP (ref 8.4–10.5)
CHLORIDE SERPL-SCNC: 95 MMOL/L — LOW (ref 96–108)
CO2 SERPL-SCNC: 23 MMOL/L — SIGNIFICANT CHANGE UP (ref 22–31)
COLOR SPEC: YELLOW — SIGNIFICANT CHANGE UP
COMMENT - URINE: SIGNIFICANT CHANGE UP
CREAT SERPL-MCNC: 0.49 MG/DL — LOW (ref 0.5–1.3)
DIFF PNL FLD: ABNORMAL
EGFR: 94 ML/MIN/1.73M2 — SIGNIFICANT CHANGE UP
EPI CELLS # UR: SIGNIFICANT CHANGE UP /HPF (ref 0–5)
GLUCOSE SERPL-MCNC: 141 MG/DL — HIGH (ref 70–99)
GLUCOSE UR QL: NEGATIVE — SIGNIFICANT CHANGE UP
HCT VFR BLD CALC: 37 % — SIGNIFICANT CHANGE UP (ref 34.5–45)
HGB BLD-MCNC: 12.4 G/DL — SIGNIFICANT CHANGE UP (ref 11.5–15.5)
KETONES UR-MCNC: NEGATIVE — SIGNIFICANT CHANGE UP
LEUKOCYTE ESTERASE UR-ACNC: ABNORMAL
MCHC RBC-ENTMCNC: 29.4 PG — SIGNIFICANT CHANGE UP (ref 27–34)
MCHC RBC-ENTMCNC: 33.5 GM/DL — SIGNIFICANT CHANGE UP (ref 32–36)
MCV RBC AUTO: 87.7 FL — SIGNIFICANT CHANGE UP (ref 80–100)
NITRITE UR-MCNC: POSITIVE
NRBC # BLD: 0 /100 WBCS — SIGNIFICANT CHANGE UP (ref 0–0)
PH UR: 7.5 — SIGNIFICANT CHANGE UP (ref 5–8)
PLATELET # BLD AUTO: 233 K/UL — SIGNIFICANT CHANGE UP (ref 150–400)
POTASSIUM SERPL-MCNC: 3.7 MMOL/L — SIGNIFICANT CHANGE UP (ref 3.5–5.3)
POTASSIUM SERPL-SCNC: 3.7 MMOL/L — SIGNIFICANT CHANGE UP (ref 3.5–5.3)
PROT UR-MCNC: NEGATIVE MG/DL — SIGNIFICANT CHANGE UP
RBC # BLD: 4.22 M/UL — SIGNIFICANT CHANGE UP (ref 3.8–5.2)
RBC # FLD: 12.6 % — SIGNIFICANT CHANGE UP (ref 10.3–14.5)
RBC CASTS # UR COMP ASSIST: < 5 /HPF — SIGNIFICANT CHANGE UP
RH IG SCN BLD-IMP: POSITIVE — SIGNIFICANT CHANGE UP
SODIUM SERPL-SCNC: 128 MMOL/L — LOW (ref 135–145)
SP GR SPEC: 1.01 — SIGNIFICANT CHANGE UP (ref 1–1.03)
UROBILINOGEN FLD QL: 0.2 E.U./DL — SIGNIFICANT CHANGE UP
WBC # BLD: 10.91 K/UL — HIGH (ref 3.8–10.5)
WBC # FLD AUTO: 10.91 K/UL — HIGH (ref 3.8–10.5)
WBC UR QL: < 5 /HPF — SIGNIFICANT CHANGE UP

## 2023-09-02 PROCEDURE — 99231 SBSQ HOSP IP/OBS SF/LOW 25: CPT

## 2023-09-02 RX ORDER — SODIUM CHLORIDE 9 MG/ML
1000 INJECTION, SOLUTION INTRAVENOUS
Refills: 0 | Status: DISCONTINUED | OUTPATIENT
Start: 2023-09-02 | End: 2023-09-05

## 2023-09-02 RX ORDER — SERTRALINE 25 MG/1
25 TABLET, FILM COATED ORAL DAILY
Refills: 0 | Status: DISCONTINUED | OUTPATIENT
Start: 2023-09-02 | End: 2023-09-06

## 2023-09-02 RX ORDER — LEVOTHYROXINE SODIUM 125 MCG
25 TABLET ORAL DAILY
Refills: 0 | Status: DISCONTINUED | OUTPATIENT
Start: 2023-09-02 | End: 2023-09-06

## 2023-09-02 RX ORDER — OXYCODONE HYDROCHLORIDE 5 MG/1
5 TABLET ORAL EVERY 4 HOURS
Refills: 0 | Status: DISCONTINUED | OUTPATIENT
Start: 2023-09-02 | End: 2023-09-04

## 2023-09-02 RX ORDER — OXYCODONE HYDROCHLORIDE 5 MG/1
10 TABLET ORAL EVERY 4 HOURS
Refills: 0 | Status: DISCONTINUED | OUTPATIENT
Start: 2023-09-02 | End: 2023-09-04

## 2023-09-02 RX ORDER — ACETAMINOPHEN 500 MG
650 TABLET ORAL EVERY 4 HOURS
Refills: 0 | Status: DISCONTINUED | OUTPATIENT
Start: 2023-09-02 | End: 2023-09-03

## 2023-09-02 RX ORDER — LANOLIN ALCOHOL/MO/W.PET/CERES
3 CREAM (GRAM) TOPICAL AT BEDTIME
Refills: 0 | Status: DISCONTINUED | OUTPATIENT
Start: 2023-09-02 | End: 2023-09-06

## 2023-09-02 RX ADMIN — OXYCODONE HYDROCHLORIDE 5 MILLIGRAM(S): 5 TABLET ORAL at 17:19

## 2023-09-02 RX ADMIN — OXYCODONE HYDROCHLORIDE 5 MILLIGRAM(S): 5 TABLET ORAL at 09:54

## 2023-09-02 RX ADMIN — Medication 3 MILLIGRAM(S): at 01:48

## 2023-09-02 RX ADMIN — SODIUM CHLORIDE 75 MILLILITER(S): 9 INJECTION, SOLUTION INTRAVENOUS at 22:27

## 2023-09-02 RX ADMIN — OXYCODONE HYDROCHLORIDE 5 MILLIGRAM(S): 5 TABLET ORAL at 08:54

## 2023-09-02 RX ADMIN — OXYCODONE HYDROCHLORIDE 10 MILLIGRAM(S): 5 TABLET ORAL at 12:55

## 2023-09-02 RX ADMIN — SERTRALINE 25 MILLIGRAM(S): 25 TABLET, FILM COATED ORAL at 12:55

## 2023-09-02 RX ADMIN — OXYCODONE HYDROCHLORIDE 5 MILLIGRAM(S): 5 TABLET ORAL at 22:27

## 2023-09-02 RX ADMIN — OXYCODONE HYDROCHLORIDE 5 MILLIGRAM(S): 5 TABLET ORAL at 18:19

## 2023-09-02 RX ADMIN — Medication 25 MICROGRAM(S): at 05:26

## 2023-09-02 RX ADMIN — OXYCODONE HYDROCHLORIDE 5 MILLIGRAM(S): 5 TABLET ORAL at 23:27

## 2023-09-02 RX ADMIN — OXYCODONE HYDROCHLORIDE 10 MILLIGRAM(S): 5 TABLET ORAL at 13:55

## 2023-09-02 NOTE — PROGRESS NOTE ADULT - SUBJECTIVE AND OBJECTIVE BOX
83F with hypothyroidism and anxiety who presented with left hip pain s/p mechanical fall. She was found to have left femoral neck fracture and admitted to orthopedic surgery for L hip hemiarthroplasty. ROS negative    No acute events overnight. Today, patient reports feeling well with pain well controlled.    OBJECTIVE  Vital Signs Last 24 Hrs  T(C): 37.1 (02 Sep 2023 08:29), Max: 37.1 (02 Sep 2023 08:29)  T(F): 98.7 (02 Sep 2023 08:29), Max: 98.7 (02 Sep 2023 08:29)  HR: 90 (02 Sep 2023 08:29) (82 - 93)  BP: 152/85 (02 Sep 2023 08:29) (152/85 - 178/88)  BP(mean): 107 (02 Sep 2023 08:29) (107 - 107)  RR: 17 (02 Sep 2023 08:29) (16 - 18)  SpO2: 93% (02 Sep 2023 08:29) (93% - 94%)    Parameters below as of 02 Sep 2023 08:29  Patient On (Oxygen Delivery Method): room air    I&O's Summary    01 Sep 2023 07:01  -  02 Sep 2023 07:00  --------------------------------------------------------  IN: 0 mL / OUT: 650 mL / NET: -650 mL    Physical Exam  General: Elderly female in NAD  CV: S1 S2 RRR  Lungs: CTABL, no wheezing  Abd: Soft, NT/ND +BS  Neuro: AAOx3, non-focal    Labs:                        12.4   10.91 )-----------( 233      ( 02 Sep 2023 05:30 )             37.0   09-02    128<L>  |  95<L>  |  7   ----------------------------<  141<H>  3.7   |  23  |  0.49<L>    Ca    9.5      02 Sep 2023 05:30    PT/INR - ( 01 Sep 2023 20:36 )   PT: 9.9 sec;   INR: 0.86          PTT - ( 01 Sep 2023 20:36 )  PTT:31.8 sec    CAPILLARY BLOOD GLUCOSE    Imaging reviewed    MEDICATIONS  (STANDING):  levothyroxine 25 MICROGram(s) Oral daily  sertraline 25 milliGRAM(s) Oral daily    MEDICATIONS  (PRN):  acetaminophen     Tablet .. 650 milliGRAM(s) Oral every 4 hours PRN Mild Pain (1 - 3)  melatonin 3 milliGRAM(s) Oral at bedtime PRN Sleep  oxyCODONE    IR 10 milliGRAM(s) Oral every 4 hours PRN Severe Pain (7 - 10)  oxyCODONE    IR 5 milliGRAM(s) Oral every 4 hours PRN Moderate Pain (4 - 6)

## 2023-09-02 NOTE — PATIENT PROFILE ADULT - PHONE #
How Severe Are They?: moderate Is This A New Presentation, Or A Follow-Up?: Skin Lesions [phone]

## 2023-09-02 NOTE — PATIENT PROFILE ADULT - FUNCTIONAL ASSESSMENT - BASIC MOBILITY 6.
1-calculated by average/Not able to assess (calculate score using [facility] averaging method)

## 2023-09-02 NOTE — PATIENT PROFILE ADULT - NSPROPTRIGHTNOTIFY_GEN_A_NUR
Department of Anesthesiology  Postprocedure Note    Patient: Princess Stewart  MRN: [id number]  YOB: Leonor Phase I: Leonor Score: 9    Leonor Phase II: Leonor Score: 10      Anesthesia Post Evaluation    Patient location during evaluation: floor  Patient participation: complete - patient participated  Level of consciousness: awake and alert  Airway patency: patent  Nausea & Vomiting: no nausea and no vomiting  Complications: no  Cardiovascular status: hemodynamically stable  Respiratory status: acceptable and room air  Hydration status: euvolemic
declines

## 2023-09-02 NOTE — PATIENT PROFILE ADULT - DO YOU FEEL UNSAFE AT HOME, WORK, OR SCHOOL?
[0] : left 0 [1+] : left 1+ [Purpura] : no purpura  [Petechiae] : no petechiae [Skin Ulcer] : ulcer [Alert] : not alert [Oriented to Person] : disoriented to person [Oriented to Place] : disoriented to place [Calm] : calm [de-identified] : comfortable  [de-identified] : HTN [de-identified] : non ambulatory , forefoot deformities with bunions, s/p left 1st metatarsal head resection [de-identified] : left foot incision site healed, no erythema, no purulence, no malodor, no proximal streaking [de-identified] : Dementia [FreeTextEntry1] : Right foot 1st metatarsal - closed [de-identified] : None [de-identified] : NSC - no dressing no

## 2023-09-02 NOTE — PATIENT PROFILE ADULT - FALL HARM RISK - HARM RISK INTERVENTIONS

## 2023-09-02 NOTE — PATIENT PROFILE ADULT - DO YOU FEEL LIKE HURTING YOURSELF OR OTHERS?
CHIEF COMPLAINT:  Candy Schneider is a 29 y.o. male and was seen today for follow-up of psychiatric condition and psychotropic medication management. HPI:    HPI    Griffin Bassett reports the following psychiatric symptoms:  anxiety and ADHD. The symptoms have been present for years and are of moderate/high severity. The symptoms occur daily. Pt reports an increase in symptoms due multiple medical aliments and not working. Pt is here today to discuss his tx plan      FAMILY/SOCIAL HX: Was hospitalized at [facility] due to kidney stones. March 21 st going to [facility] for ECHO and pacemaker reading. REVIEW OF SYSTEMS:  ROS    Psychiatric:  anxiety and ADHD  Appetite:no change from normal - lost 3 lbs since last visit. Sleep: poor with DIS (difficulty initiating sleep) due to racing thoughts and pain   Neuro: Migraines, HA's, post stroke     Visit Vitals    /67    Pulse 87    Ht 5' 7\" (1.702 m)    Wt 74.8 kg (164 lb 12.8 oz)    BMI 25.81 kg/m2       Side Effects:  none    MENTAL STATUS EXAM:   Sensorium  oriented to time, place and person   Relations cooperative   Appearance:  age appropriate and casually dressed   Motor Behavior:  within normal limits   Speech:  normal pitch and soft   Thought Process: goal directed   Thought Content free of delusions and free of hallucinations   Suicidal ideations no plan  and no intention   Homicidal ideations no plan  and no intention   Mood:  anxious   Affect:  depressed   Memory recent  adequate and impaired   Memory remote:  adequate   Concentration:  adequate   Abstraction:  abstract   Insight:  fair and good   Reliability good   Judgment:  fair and good     MEDICAL DECISION MAKING:  Problems addressed today:    ICD-10-CM ICD-9-CM    1. 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 Panic attack F41.0 300.01    3. History of ADHD Z86.59 V11.8    4.  Anxiety about health F41.8 300.09 clonazePAM (KLONOPIN) 0.5 mg tablet       Assessment:   Griffin Bassett is responding to treatment. Although, pt does report an increase in symptoms due to multiple medical aliments. Pt reports he has been hospitalized multiple times due to kidney stones. Pt's cardiac medications had to be changed due to potentially causing kidney stones. Pt reports his liver enzymes are now elevated. He is following up with urology tomorrow re: kidney stones for potential surgical intervention. Discussed decreasing Remeron to 7.5mg to provide more sedation to better tx for sleep difficulty. Pt will decrease and try to see if it is more beneficial. Pt asking about trazodone for sleep but looking at risk vs benefit this would not be a good option due to cardiac and hepatic safety implications. Pt reports benefit with klonopin 0.5mg daily for panic/anxiety symptoms. Will continue klonopin 0.5mg daily at this time. Also, will d/c vistaril due to ineffective for symptoms. Discussed tx options, side effects and pt verbalized understanding. Will start Buspar 7.5mg BID for anxiety symptoms. Discussed that it typically takes 2-4 weeks to gain therapeutic efficacy with Buspar. Provided support and encouragement. Total encounter time was 25 minutes; >50% of time was spent counseling/coordinating care regarding anxiety/panic. Plan:   1. Current Outpatient Prescriptions   Medication Sig Dispense Refill    [START ON 2/15/2018] clonazePAM (KLONOPIN) 0.5 mg tablet Take 1 Tab by mouth daily as needed. 30 Tab 1    mirtazapine (REMERON SOL-TAB) 15 mg disintegrating tablet Take 1 Tab by mouth nightly. 30 Tab 2    busPIRone (BUSPAR) 7.5 mg tablet Take 1 Tab by mouth two (2) times a day. 60 Tab 1    HYDROcodone-acetaminophen (NORCO) 5-325 mg per tablet       metoprolol succinate (TOPROL-XL) 50 mg XL tablet Take 1 Tab by mouth daily. 90 Tab 0    spironolactone (ALDACTONE) 25 mg tablet Take 1 Tab by mouth daily. 30 Tab 1    lisinopril (PRINIVIL, ZESTRIL) 20 mg tablet Take 20 mg by mouth daily.       ondansetron [facility] ODT) 4 mg disintegrating tablet Take 1 Tab by mouth every eight (8) hours as needed for Nausea. 10 Tab 0    furosemide (LASIX) 20 mg tablet Take 1 Tab by mouth two (2) times a day. 30 Tab 1          medication changes made today: decrease remeron 7.5 mg for anxiety/depression/sleep, d/c vistaril, start buspar 7.5mg BID for anxiety, cont klonopin 0.5mg daily PRN for anxiety/panic attacks. 2.  Counseling and coordination of care including instructions for treatment, risks/benefits, risk factor reduction and patient/family education. He agrees with the plan. Patient instructed to call with any side effects, questions or issues. 3.  Follow-up Disposition:  Return in about 8 weeks (around 4/9/2018).     2/12/2018  [address] [place] [address], NP no

## 2023-09-02 NOTE — PROGRESS NOTE ADULT - SUBJECTIVE AND OBJECTIVE BOX
Ortho Note    Pt comfortable without complaints, pain controlled  Denies CP, SOB, N/V, numbness/tingling     Vital Signs Last 24 Hrs  T(C): 36.8 (09-02-23 @ 05:25), Max: 36.8 (09-02-23 @ 05:25)  T(F): 98.3 (09-02-23 @ 05:25), Max: 98.3 (09-02-23 @ 05:25)  HR: 91 (09-02-23 @ 05:25) (91 - 91)  BP: 153/84 (09-02-23 @ 05:25) (153/84 - 153/84)  BP(mean): --  RR: 18 (09-02-23 @ 05:25) (18 - 18)  SpO2: 94% (09-02-23 @ 05:25) (94% - 94%)  I&O's Summary    01 Sep 2023 07:01  -  02 Sep 2023 07:00  --------------------------------------------------------  IN: 0 mL / OUT: 650 mL / NET: -650 mL        General: Pt Alert and oriented, NAD  Pulses: 2+  Sensation: SILT  Motor: 5/5 EHL/FHL/TA/GS                          12.1   7.09  )-----------( 222      ( 01 Sep 2023 20:36 )             35.9     09-01    132<L>  |  98  |  7   ----------------------------<  162<H>  3.9   |  24  |  0.49<L>    Ca    9.8      01 Sep 2023 20:36        A/P: 83yFemale  s/p L FNF   - Stable  - Pain Control  - OR for tomorrow with [place]  - NPO/fluids tonight  - DVT ppx: SCDs  - PT, WBS: Strict bedrest    Ortho Pager [phone]

## 2023-09-03 LAB
ANION GAP SERPL CALC-SCNC: 10 MMOL/L — SIGNIFICANT CHANGE UP (ref 5–17)
ANION GAP SERPL CALC-SCNC: 8 MMOL/L — SIGNIFICANT CHANGE UP (ref 5–17)
BUN SERPL-MCNC: 10 MG/DL — SIGNIFICANT CHANGE UP (ref 7–23)
BUN SERPL-MCNC: 14 MG/DL — SIGNIFICANT CHANGE UP (ref 7–23)
CALCIUM SERPL-MCNC: 9.3 MG/DL — SIGNIFICANT CHANGE UP (ref 8.4–10.5)
CALCIUM SERPL-MCNC: 9.6 MG/DL — SIGNIFICANT CHANGE UP (ref 8.4–10.5)
CHLORIDE SERPL-SCNC: 93 MMOL/L — LOW (ref 96–108)
CHLORIDE SERPL-SCNC: 96 MMOL/L — SIGNIFICANT CHANGE UP (ref 96–108)
CO2 SERPL-SCNC: 24 MMOL/L — SIGNIFICANT CHANGE UP (ref 22–31)
CO2 SERPL-SCNC: 26 MMOL/L — SIGNIFICANT CHANGE UP (ref 22–31)
CREAT SERPL-MCNC: 0.6 MG/DL — SIGNIFICANT CHANGE UP (ref 0.5–1.3)
CREAT SERPL-MCNC: 0.7 MG/DL — SIGNIFICANT CHANGE UP (ref 0.5–1.3)
EGFR: 86 ML/MIN/1.73M2 — SIGNIFICANT CHANGE UP
EGFR: 90 ML/MIN/1.73M2 — SIGNIFICANT CHANGE UP
GLUCOSE SERPL-MCNC: 134 MG/DL — HIGH (ref 70–99)
GLUCOSE SERPL-MCNC: 196 MG/DL — HIGH (ref 70–99)
HCT VFR BLD CALC: 35.6 % — SIGNIFICANT CHANGE UP (ref 34.5–45)
HGB BLD-MCNC: 11.8 G/DL — SIGNIFICANT CHANGE UP (ref 11.5–15.5)
MCHC RBC-ENTMCNC: 29 PG — SIGNIFICANT CHANGE UP (ref 27–34)
MCHC RBC-ENTMCNC: 33.1 GM/DL — SIGNIFICANT CHANGE UP (ref 32–36)
MCV RBC AUTO: 87.5 FL — SIGNIFICANT CHANGE UP (ref 80–100)
NRBC # BLD: 0 /100 WBCS — SIGNIFICANT CHANGE UP (ref 0–0)
PLATELET # BLD AUTO: 204 K/UL — SIGNIFICANT CHANGE UP (ref 150–400)
POTASSIUM SERPL-MCNC: 3.6 MMOL/L — SIGNIFICANT CHANGE UP (ref 3.5–5.3)
POTASSIUM SERPL-MCNC: 4.3 MMOL/L — SIGNIFICANT CHANGE UP (ref 3.5–5.3)
POTASSIUM SERPL-SCNC: 3.6 MMOL/L — SIGNIFICANT CHANGE UP (ref 3.5–5.3)
POTASSIUM SERPL-SCNC: 4.3 MMOL/L — SIGNIFICANT CHANGE UP (ref 3.5–5.3)
RBC # BLD: 4.07 M/UL — SIGNIFICANT CHANGE UP (ref 3.8–5.2)
RBC # FLD: 12.5 % — SIGNIFICANT CHANGE UP (ref 10.3–14.5)
SODIUM SERPL-SCNC: 127 MMOL/L — LOW (ref 135–145)
SODIUM SERPL-SCNC: 130 MMOL/L — LOW (ref 135–145)
WBC # BLD: 10 K/UL — SIGNIFICANT CHANGE UP (ref 3.8–10.5)
WBC # FLD AUTO: 10 K/UL — SIGNIFICANT CHANGE UP (ref 3.8–10.5)

## 2023-09-03 PROCEDURE — 72170 X-RAY EXAM OF PELVIS: CPT | Mod: 26

## 2023-09-03 PROCEDURE — 99231 SBSQ HOSP IP/OBS SF/LOW 25: CPT

## 2023-09-03 DEVICE — IMPLANTABLE DEVICE: Type: IMPLANTABLE DEVICE | Status: FUNCTIONAL

## 2023-09-03 DEVICE — STEM ACC V40 127 DEG SZ 3 30X102MM 127 DEG: Type: IMPLANTABLE DEVICE | Status: FUNCTIONAL

## 2023-09-03 RX ORDER — PANTOPRAZOLE SODIUM 20 MG/1
40 TABLET, DELAYED RELEASE ORAL
Refills: 0 | Status: DISCONTINUED | OUTPATIENT
Start: 2023-09-03 | End: 2023-09-06

## 2023-09-03 RX ORDER — TRAMADOL HYDROCHLORIDE 50 MG/1
50 TABLET ORAL EVERY 4 HOURS
Refills: 0 | Status: DISCONTINUED | OUTPATIENT
Start: 2023-09-03 | End: 2023-09-03

## 2023-09-03 RX ORDER — ONDANSETRON 8 MG/1
4 TABLET, FILM COATED ORAL EVERY 6 HOURS
Refills: 0 | Status: DISCONTINUED | OUTPATIENT
Start: 2023-09-03 | End: 2023-09-06

## 2023-09-03 RX ORDER — SODIUM CHLORIDE 9 MG/ML
1000 INJECTION, SOLUTION INTRAVENOUS
Refills: 0 | Status: DISCONTINUED | OUTPATIENT
Start: 2023-09-04 | End: 2023-09-05

## 2023-09-03 RX ORDER — MAGNESIUM HYDROXIDE 400 MG/1
30 TABLET, CHEWABLE ORAL DAILY
Refills: 0 | Status: DISCONTINUED | OUTPATIENT
Start: 2023-09-03 | End: 2023-09-06

## 2023-09-03 RX ORDER — SODIUM CHLORIDE 9 MG/ML
2 INJECTION INTRAMUSCULAR; INTRAVENOUS; SUBCUTANEOUS ONCE
Refills: 0 | Status: COMPLETED | OUTPATIENT
Start: 2023-09-03 | End: 2023-09-03

## 2023-09-03 RX ORDER — CHLORHEXIDINE GLUCONATE 213 G/1000ML
1 SOLUTION TOPICAL EVERY 12 HOURS
Refills: 0 | Status: COMPLETED | OUTPATIENT
Start: 2023-09-03 | End: 2023-09-03

## 2023-09-03 RX ORDER — ACETAMINOPHEN 500 MG
1000 TABLET ORAL EVERY 8 HOURS
Refills: 0 | Status: DISCONTINUED | OUTPATIENT
Start: 2023-09-03 | End: 2023-09-06

## 2023-09-03 RX ORDER — OXYCODONE HYDROCHLORIDE 5 MG/1
5 TABLET ORAL EVERY 4 HOURS
Refills: 0 | Status: DISCONTINUED | OUTPATIENT
Start: 2023-09-03 | End: 2023-09-03

## 2023-09-03 RX ORDER — SENNA PLUS 8.6 MG/1
2 TABLET ORAL AT BEDTIME
Refills: 0 | Status: DISCONTINUED | OUTPATIENT
Start: 2023-09-03 | End: 2023-09-06

## 2023-09-03 RX ORDER — POLYETHYLENE GLYCOL 3350 17 G/17G
17 POWDER, FOR SOLUTION ORAL AT BEDTIME
Refills: 0 | Status: DISCONTINUED | OUTPATIENT
Start: 2023-09-03 | End: 2023-09-06

## 2023-09-03 RX ORDER — CEFAZOLIN SODIUM 1 G
2000 VIAL (EA) INJECTION EVERY 8 HOURS
Refills: 0 | Status: COMPLETED | OUTPATIENT
Start: 2023-09-03 | End: 2023-09-04

## 2023-09-03 RX ADMIN — SODIUM CHLORIDE 2 GRAM(S): 9 INJECTION INTRAMUSCULAR; INTRAVENOUS; SUBCUTANEOUS at 23:30

## 2023-09-03 RX ADMIN — SENNA PLUS 2 TABLET(S): 8.6 TABLET ORAL at 21:55

## 2023-09-03 RX ADMIN — OXYCODONE HYDROCHLORIDE 10 MILLIGRAM(S): 5 TABLET ORAL at 06:14

## 2023-09-03 RX ADMIN — OXYCODONE HYDROCHLORIDE 10 MILLIGRAM(S): 5 TABLET ORAL at 05:14

## 2023-09-03 RX ADMIN — Medication 1000 MILLIGRAM(S): at 14:25

## 2023-09-03 RX ADMIN — SERTRALINE 25 MILLIGRAM(S): 25 TABLET, FILM COATED ORAL at 14:24

## 2023-09-03 RX ADMIN — Medication 1000 MILLIGRAM(S): at 22:55

## 2023-09-03 RX ADMIN — Medication 1000 MILLIGRAM(S): at 21:55

## 2023-09-03 RX ADMIN — PANTOPRAZOLE SODIUM 40 MILLIGRAM(S): 20 TABLET, DELAYED RELEASE ORAL at 14:25

## 2023-09-03 RX ADMIN — Medication 3 MILLIGRAM(S): at 01:11

## 2023-09-03 RX ADMIN — CHLORHEXIDINE GLUCONATE 1 APPLICATION(S): 213 SOLUTION TOPICAL at 05:49

## 2023-09-03 RX ADMIN — Medication 25 MICROGRAM(S): at 05:14

## 2023-09-03 RX ADMIN — POLYETHYLENE GLYCOL 3350 17 GRAM(S): 17 POWDER, FOR SOLUTION ORAL at 21:55

## 2023-09-03 RX ADMIN — Medication 100 MILLIGRAM(S): at 17:15

## 2023-09-03 RX ADMIN — Medication 1000 MILLIGRAM(S): at 15:25

## 2023-09-03 NOTE — PRE-ANESTHESIA EVALUATION ADULT - NSANTHADDINFOFT_GEN_ALL_CORE
Consult Note Adult-Internal Medicine Resident/Attending [Charted Location: [facility] [id number]] [Authored: 01-Sep-2023 23:46]- for Visit: [id number], Complete, Revised, Signed in Full, Available to Patient    Consult Note:    Provider Specialty:  Internal Medicine.    Referral/Consultation:    Initial Consult:  · Requested by Name:	Charlie Arita)  · Date/Time:	02-Sep-2023 23:15  · Reason for Referral/Consultation:	Preop clearance  · Reason for Admission	L femoral neck fracture      · Subjective and Objective:   Patient is a 83y old  Female who presents with a chief complaint of L femoral neck fracture (01 Sep 2023 22:55)      HPI:  The patient is a 84yo female with PMHx of hypothyroidism and anxiety who presents to the ED with left hip pain after a mechanical fall. History obtained from patient and two sons Chapo and Uriel at bedside. Patient was walking down a steep hill and began to run but then suddenly lost balance and fell onto her buttocks. It was too painful for her to get up and walk; she was unable to bear weight on the RLE. Denies hitting head, HA, dizziness, lightheadedness, syncope, LOC.    Patient was fully independent up until 2 months ago, when she had a different mechanical fall and fell on her lower back. Since then, she is limited in her functionality due to back pain. She now needs help getting in and out of the tub, which her  helps with. She was able to walk many blocks (>3) without SOB before this fall, but now is limited to 1-2 blocks because of lower back pain.     No history of HTN, DM, CAD, MI, strokes, kidney or liver disease. Has known cardiac murmur, for over 10-20 years. Had echo done years ago but is unsure of the results. Unsure if she ever saw cardiology. Denies chest pain, palpitations, SOB, cough, leg swelling. Only takes Synthroid and Zoloft daily. Occasionally takes Tylenol for back pain. No NSAID use.       REVIEW OF SYSTEMS: see HPI    PAST MEDICAL HISTORY: hypothyroidism, anxiety    SOCIAL HISTORY: Lives with her  who helps her with partial ADLs  Tobacco use: never  EtOH use: never  Recreational drug use: never    MEDICATIONS: Synthroid & Zoloft    ALLERGIES: No Known Allergies        VITAL SIGNS:  Vital Signs Last 24 Hrs  T(C): 36.7 (01 Sep 2023 20:09), Max: 36.7 (01 Sep 2023 20:09)  T(F): 98 (01 Sep 2023 20:09), Max: 98 (01 Sep 2023 20:09)  HR: 82 (01 Sep 2023 21:52) (82 - 89)  BP: 168/82 (01 Sep 2023 21:52) (168/82 - 178/88)  RR: 18 (01 Sep 2023 20:09) (18 - 18)  SpO2: 94% (01 Sep 2023 20:09) (94% - 94%)    Parameters below as of 01 Sep 2023 20:09  Patient On (Oxygen Delivery Method): room air      PHYSICAL EXAM:  Constitutional: pleasant elderly female resting comfortably laying flat in bed; NAD  Head: NC/AT  Eyes: PERRL, EOMI, anicteric sclera  ENT: no nasal discharge; uvula midline, no oropharyngeal erythema or exudates; MMM  Neck: supple; no JVD or thyromegaly  Respiratory: CTA B/L; no W/R/R, no retractions  Cardiac: +S1/S2; RRR; +Crescendo systolic murmur best heard at LUSB, radiating to carotids  Gastrointestinal: abdomen soft, NT/ND; no rebound or guarding; +BS  Extremities: WWP, no clubbing or cyanosis; trace pedal edema of RLE  Musculoskeletal: RLE ROM limited due to pain, intact b/l UE and LLE ROM  Vascular: 2+ radial, DP/PT pulses B/L  Dermatologic: skin warm, dry and intact; no rashes, wounds, or scars  Neurologic: AAOx3; CNII-XII grossly intact; no focal deficits  Psychiatric: affect and characteristics of appearance, verbalizations, behaviors are appropriate    LABS:                        12.1   7.09  )-----------( 222      ( 01 Sep 2023 20:36 )             35.9     09-01    132<L>  |  98  |  7   ----------------------------<  162<H>  3.9   |  24  |  0.49<L>    Ca    9.8      01 Sep 2023 20:36      PT/INR - ( 01 Sep 2023 20:36 )   PT: 9.9 sec;   INR: 0.86          PTT - ( 01 Sep 2023 20:36 )  PTT:31.8 sec  Urinalysis Basic - ( 01 Sep 2023 20:36 )    Color: x / Appearance: x / SG: x / pH: x  Gluc: 162 mg/dL / Ketone: x  / Bili: x / Urobili: x   Blood: x / Protein: x / Nitrite: x   Leuk Esterase: x / RBC: x / WBC x   Sq Epi: x / Non Sq Epi: x / Bacteria: x      RADIOLOGY & ADDITIONAL TESTS: Reviewed      Assessment and Recommendation:   · Assessment	  Mrs. León is a pleasant 84yo female with PMHx of hypothyroidism and anxiety who presents to the ED with left hip pain after a mechanical fall. Found to have left femoral neck fracture, admitted to orthopedic surgery for L hip hemiarthroplasty. Medicine consulted for preop clearance.     #Pre-operative clearance  - Preop labs remarkable for elevated serum glucose and Na 132  - EKG: NSR at 70bpm, QTc 432, no TWI or ST changes  - CXR: rotated. no acute findings such as consolidations, effusions vascular congestion, or pulm edema  - METS <4, limited functionality due to age and chronic pain from prior fall  - RCRI score 0, Class I Risk, 3.9% 30-day risk of death, MI, or cardiac arrest  - Deleon score 0.4% Risk of myocardial infarction or cardiac arrest, intraoperatively or up to 30 days post-op  - No further cardiac workup indicated.    Patient deemed intermediate risk for intermediate risk procedure.    #Hypothyroidism  Takes Synthroid daily, unsure of the dose.   - F/u TSH in AM  - Please perform official med rec in AM; ask son to bring in meds    #Anxiety  Takes Zoloft daily unsure of the dose.   - Please perform official med rec in AM; ask son to bring in meds    #Elevated BP  SBP elevated to 160-180s with DBP 80-100s. No prior hx of HTN. Likely in the setting of acute pain.  - Pain control per primary team  - If continues to be elevated despite adequate pain control, will consider starting antihypertensives        Attestation Statements:    Attestation Statements:  I have personally seen and examined the patient.  I fully participated in the care of this patient.  I have made amendments to the documentation where necessary, and agree with the history, physical exam, and plan as documented by the Resident.     #Preop: EKG nsr, non ischemic. +known murmur on exam w/ previous tte wnl, euvolemic on exam. METs <4 since fall 2 months ago; limited due to chronic back/hip pain, denies CP/LONG. RCRI 0, Class 1 risk. Therefore no further work up indicated. Pt is intermediate risk for intermediate risk procedure.    #Elevated BP: in setting of pain. No hx of HTN. c/w pain control. Consider Amlodipine if remains elevated.       Electronic Signatures:  Óscar Alexis ()  (Signed 02-Sep-2023 03:57)  	Authored: Attestation Statements  	Co-Signer: Consult Note, Referral/Consultation, Subjective and Objective, Assessment and Recommendation  Nirav Jacobson)  (Signed 02-Sep-2023 02:43)  	Authored: Consult Note, Referral/Consultation, Subjective and Objective, Assessment and Recommendation      Last Updated: 02-Sep-2023 03:57 by Óscar Alexis ()

## 2023-09-03 NOTE — PROGRESS NOTE ADULT - SUBJECTIVE AND OBJECTIVE BOX
Ortho Note    Pt comfortable without complaints, pain controlled. Ready for L him zion today.  Denies CP, SOB, N/V, numbness/tingling     Vital Signs Last 24 Hrs  T(C): 36.3 (09-03-23 @ 07:15), Max: 36.3 (09-03-23 @ 05:32)  T(F): 97.3 (09-03-23 @ 07:15), Max: 97.3 (09-03-23 @ 05:32)  HR: 84 (09-03-23 @ 07:15) (84 - 84)  BP: 152/82 (09-03-23 @ 07:15) (152/82 - 152/82)  BP(mean): --  RR: 16 (09-03-23 @ 07:15) (16 - 16)  SpO2: 92% (09-03-23 @ 07:15) (92% - 92%)  I&O's Summary    02 Sep 2023 07:01  -  03 Sep 2023 07:00  --------------------------------------------------------  IN: 0 mL / OUT: 1000 mL / NET: -1000 mL        General: Pt Alert and oriented, NAD  Pulses: 2+  Sensation: SILT  Motor: 5/5 EHL/FHL/TA/GS                          11.8   10.00 )-----------( 204      ( 03 Sep 2023 05:53 )             35.6     09-03    130<L>  |  96  |  10  ----------------------------<  134<H>  3.6   |  26  |  0.60    Ca    9.6      03 Sep 2023 05:53        A/P: 83yFemale s/p L FNF  - Stable  - Pain Control  - OR this AM  - DVT ppx: SCDs  - PT, WBS: strict Bedrest until OR    Ortho Pager [phone]

## 2023-09-03 NOTE — PRE-ANESTHESIA EVALUATION ADULT - NSANTHOSAYNRD_GEN_A_CORE
No. NAZIA screening performed.  STOP BANG Legend: 0-2 = LOW Risk; 3-4 = INTERMEDIATE Risk; 5-8 = HIGH Risk

## 2023-09-03 NOTE — PROGRESS NOTE ADULT - SUBJECTIVE AND OBJECTIVE BOX
Ortho Post Op Check    Procedure: L hip zion  Surgeon: Juan J    Pt comfortable without complaints, pain controlled  Denies CP, SOB, N/V, numbness/tingling     Vital Signs Last 24 Hrs  T(C): 36.6 (09-03-23 @ 13:41), Max: 37.4 (09-03-23 @ 10:25)  T(F): 97.8 (09-03-23 @ 13:41), Max: 99.4 (09-03-23 @ 10:25)  HR: 67 (09-03-23 @ 13:41) (62 - 71)  BP: 141/74 (09-03-23 @ 13:41) (102/56 - 168/71)  BP(mean): 97 (09-03-23 @ 13:41) (72 - 102)  RR: 17 (09-03-23 @ 13:41) (12 - 24)  SpO2: 97% (09-03-23 @ 13:41) (94% - 99%)  I&O's Summary    02 Sep 2023 07:01  -  03 Sep 2023 07:00  --------------------------------------------------------  IN: 0 mL / OUT: 1000 mL / NET: -1000 mL        General: Pt Alert and oriented, NAD   Aquacel C/D/I  Pulses: 2+  Sensation: SILT  Motor: 5/5 EHL/FHL/TA/GS                          11.8   10.00 )-----------( 204      ( 03 Sep 2023 05:53 )             35.6     09-03    130<L>  |  96  |  10  ----------------------------<  134<H>  3.6   |  26  |  0.60    Ca    9.6      03 Sep 2023 05:53        Post-op X-Ray:  Intact hardware L hip hemiarthroplasty    A/P: 83yFemale POD#0 s/p L hip zion  - Stable  - Pain Control  - DVT ppx: SCDs, ASA 81 BID  - Post op abx: ancef  - PT, WBS: WBAT, posterior precautions    Ortho Pager [phone]

## 2023-09-03 NOTE — PROGRESS NOTE ADULT - SUBJECTIVE AND OBJECTIVE BOX
No acute events overnight. OR today for left hip hemiarthroplasty    OBJECTIVE  Vital Signs Last 24 Hrs  T(C): 36.6 (03 Sep 2023 13:41), Max: 37.4 (03 Sep 2023 10:25)  T(F): 97.8 (03 Sep 2023 13:41), Max: 99.4 (03 Sep 2023 10:25)  HR: 67 (03 Sep 2023 13:41) (62 - 84)  BP: 141/74 (03 Sep 2023 13:41) (102/56 - 168/71)  BP(mean): 97 (03 Sep 2023 13:41) (72 - 107)  RR: 17 (03 Sep 2023 13:41) (12 - 24)  SpO2: 97% (03 Sep 2023 13:41) (92% - 99%)    Parameters below as of 03 Sep 2023 13:41  Patient On (Oxygen Delivery Method): nasal cannula  O2 Flow (L/min): 2  I&O's Summary    02 Sep 2023 07:01  -  03 Sep 2023 07:00  --------------------------------------------------------  IN: 0 mL / OUT: 1000 mL / NET: -1000 mL    Physical Exam  General: Elderly female in NAD  CV: S1 S2 RRR  Lungs: CTABL, no wheezing  Abd: Soft, NT/ND +BS  Neuro: AAOx3, non-focal    Labs:                        11.8   10.00 )-----------( 204      ( 03 Sep 2023 05:53 )             35.6   09-03    130<L>  |  96  |  10  ----------------------------<  134<H>  3.6   |  26  |  0.60    Ca    9.6      03 Sep 2023 05:53    CAPILLARY BLOOD GLUCOSE    Imaging reviewed    MEDICATIONS  (STANDING):  acetaminophen     Tablet .. 1000 milliGRAM(s) Oral every 8 hours  ceFAZolin   IVPB 2000 milliGRAM(s) IV Intermittent every 8 hours  lactated ringers. 1000 milliLiter(s) (75 mL/Hr) IV Continuous <Continuous>  levothyroxine 25 MICROGram(s) Oral daily  pantoprazole    Tablet 40 milliGRAM(s) Oral before breakfast  polyethylene glycol 3350 17 Gram(s) Oral at bedtime  senna 2 Tablet(s) Oral at bedtime  sertraline 25 milliGRAM(s) Oral daily    MEDICATIONS  (PRN):  magnesium hydroxide Suspension 30 milliLiter(s) Oral daily PRN Constipation  melatonin 3 milliGRAM(s) Oral at bedtime PRN Sleep  ondansetron Injectable 4 milliGRAM(s) IV Push every 6 hours PRN Nausea and/or Vomiting  oxyCODONE    IR 10 milliGRAM(s) Oral every 4 hours PRN Severe Pain (7 - 10)  oxyCODONE    IR 5 milliGRAM(s) Oral every 4 hours PRN Moderate Pain (4 - 6)

## 2023-09-04 LAB
ANION GAP SERPL CALC-SCNC: 8 MMOL/L — SIGNIFICANT CHANGE UP (ref 5–17)
BUN SERPL-MCNC: 15 MG/DL — SIGNIFICANT CHANGE UP (ref 7–23)
CALCIUM SERPL-MCNC: 9.4 MG/DL — SIGNIFICANT CHANGE UP (ref 8.4–10.5)
CHLORIDE SERPL-SCNC: 99 MMOL/L — SIGNIFICANT CHANGE UP (ref 96–108)
CO2 SERPL-SCNC: 26 MMOL/L — SIGNIFICANT CHANGE UP (ref 22–31)
CREAT SERPL-MCNC: 0.64 MG/DL — SIGNIFICANT CHANGE UP (ref 0.5–1.3)
EGFR: 88 ML/MIN/1.73M2 — SIGNIFICANT CHANGE UP
GLUCOSE SERPL-MCNC: 131 MG/DL — HIGH (ref 70–99)
HCT VFR BLD CALC: 27.7 % — LOW (ref 34.5–45)
HGB BLD-MCNC: 9.8 G/DL — LOW (ref 11.5–15.5)
MCHC RBC-ENTMCNC: 30.3 PG — SIGNIFICANT CHANGE UP (ref 27–34)
MCHC RBC-ENTMCNC: 35.4 GM/DL — SIGNIFICANT CHANGE UP (ref 32–36)
MCV RBC AUTO: 85.8 FL — SIGNIFICANT CHANGE UP (ref 80–100)
NRBC # BLD: 0 /100 WBCS — SIGNIFICANT CHANGE UP (ref 0–0)
PLATELET # BLD AUTO: 193 K/UL — SIGNIFICANT CHANGE UP (ref 150–400)
POTASSIUM SERPL-MCNC: 4 MMOL/L — SIGNIFICANT CHANGE UP (ref 3.5–5.3)
POTASSIUM SERPL-SCNC: 4 MMOL/L — SIGNIFICANT CHANGE UP (ref 3.5–5.3)
RBC # BLD: 3.23 M/UL — LOW (ref 3.8–5.2)
RBC # FLD: 13.1 % — SIGNIFICANT CHANGE UP (ref 10.3–14.5)
SODIUM SERPL-SCNC: 133 MMOL/L — LOW (ref 135–145)
WBC # BLD: 10.47 K/UL — SIGNIFICANT CHANGE UP (ref 3.8–10.5)
WBC # FLD AUTO: 10.47 K/UL — SIGNIFICANT CHANGE UP (ref 3.8–10.5)

## 2023-09-04 PROCEDURE — 99231 SBSQ HOSP IP/OBS SF/LOW 25: CPT

## 2023-09-04 RX ORDER — OXYCODONE HYDROCHLORIDE 5 MG/1
5 TABLET ORAL EVERY 4 HOURS
Refills: 0 | Status: DISCONTINUED | OUTPATIENT
Start: 2023-09-04 | End: 2023-09-06

## 2023-09-04 RX ORDER — ACETAMINOPHEN 500 MG
650 TABLET ORAL EVERY 6 HOURS
Refills: 0 | Status: DISCONTINUED | OUTPATIENT
Start: 2023-09-04 | End: 2023-09-05

## 2023-09-04 RX ORDER — OXYCODONE HYDROCHLORIDE 5 MG/1
2.5 TABLET ORAL EVERY 4 HOURS
Refills: 0 | Status: DISCONTINUED | OUTPATIENT
Start: 2023-09-04 | End: 2023-09-06

## 2023-09-04 RX ADMIN — Medication 1000 MILLIGRAM(S): at 06:43

## 2023-09-04 RX ADMIN — SERTRALINE 25 MILLIGRAM(S): 25 TABLET, FILM COATED ORAL at 14:18

## 2023-09-04 RX ADMIN — POLYETHYLENE GLYCOL 3350 17 GRAM(S): 17 POWDER, FOR SOLUTION ORAL at 21:55

## 2023-09-04 RX ADMIN — PANTOPRAZOLE SODIUM 40 MILLIGRAM(S): 20 TABLET, DELAYED RELEASE ORAL at 05:43

## 2023-09-04 RX ADMIN — Medication 1000 MILLIGRAM(S): at 15:23

## 2023-09-04 RX ADMIN — SENNA PLUS 2 TABLET(S): 8.6 TABLET ORAL at 21:55

## 2023-09-04 RX ADMIN — Medication 1000 MILLIGRAM(S): at 14:18

## 2023-09-04 RX ADMIN — Medication 1000 MILLIGRAM(S): at 21:55

## 2023-09-04 RX ADMIN — Medication 100 MILLIGRAM(S): at 00:03

## 2023-09-04 RX ADMIN — Medication 1000 MILLIGRAM(S): at 05:43

## 2023-09-04 RX ADMIN — Medication 1000 MILLIGRAM(S): at 22:55

## 2023-09-04 RX ADMIN — Medication 25 MICROGRAM(S): at 05:43

## 2023-09-04 NOTE — PROGRESS NOTE ADULT - SUBJECTIVE AND OBJECTIVE BOX
No acute events overnight. POD#1 s/p left hip hemiarthroplasty. Reports minimal pain. Eating well with a good appetite. +BM/+F. ROS otherwise negative    OBJECTIVE  Vital Signs Last 24 Hrs  T(C): 36.4 (04 Sep 2023 09:00), Max: 36.8 (04 Sep 2023 00:18)  T(F): 97.5 (04 Sep 2023 09:00), Max: 98.2 (04 Sep 2023 00:18)  HR: 89 (04 Sep 2023 09:00) (62 - 89)  BP: 115/69 (04 Sep 2023 09:00) (99/67 - 141/74)  BP(mean): 91 (03 Sep 2023 15:57) (72 - 97)  RR: 16 (04 Sep 2023 09:00) (12 - 22)  SpO2: 95% (04 Sep 2023 09:00) (82% - 97%)    Parameters below as of 04 Sep 2023 09:00  Patient On (Oxygen Delivery Method): nasal cannula  O2 Flow (L/min): 2  I&O's Summary    03 Sep 2023 07:01  -  04 Sep 2023 07:00  --------------------------------------------------------  IN: 2510 mL / OUT: 1200 mL / NET: 1310 mL    Physical Exam  General: Elderly female in NAD  CV: S1 S2 RRR  Lungs: CTABL, no wheezing  Abd: Soft, NT/ND +BS  Ext: No edema, Left hip dressing C/D/I  Neuro: AAOx3, non-focal    Labs:                        9.8    10.47 )-----------( 193      ( 04 Sep 2023 06:56 )             27.7   09-04    133<L>  |  99  |  15  ----------------------------<  131<H>  4.0   |  26  |  0.64    Ca    9.4      04 Sep 2023 06:56    CAPILLARY BLOOD GLUCOSE    Imaging reviewed    MEDICATIONS  (STANDING):  acetaminophen     Tablet .. 1000 milliGRAM(s) Oral every 8 hours  lactated ringers. 1000 milliLiter(s) (150 mL/Hr) IV Continuous <Continuous>  lactated ringers. 1000 milliLiter(s) (75 mL/Hr) IV Continuous <Continuous>  levothyroxine 25 MICROGram(s) Oral daily  pantoprazole    Tablet 40 milliGRAM(s) Oral before breakfast  polyethylene glycol 3350 17 Gram(s) Oral at bedtime  senna 2 Tablet(s) Oral at bedtime  sertraline 25 milliGRAM(s) Oral daily    MEDICATIONS  (PRN):  magnesium hydroxide Suspension 30 milliLiter(s) Oral daily PRN Constipation  melatonin 3 milliGRAM(s) Oral at bedtime PRN Sleep  ondansetron Injectable 4 milliGRAM(s) IV Push every 6 hours PRN Nausea and/or Vomiting  oxyCODONE    IR 10 milliGRAM(s) Oral every 4 hours PRN Severe Pain (7 - 10)  oxyCODONE    IR 5 milliGRAM(s) Oral every 4 hours PRN Moderate Pain (4 - 6)

## 2023-09-04 NOTE — PHYSICAL THERAPY INITIAL EVALUATION ADULT - ADDITIONAL COMMENTS
Pt lives in an apartment with her , no stairs to enter, independent with all mobility prior to admission. Ambulates with rolling walker

## 2023-09-04 NOTE — PROGRESS NOTE ADULT - SUBJECTIVE AND OBJECTIVE BOX
Ortho Note    Pt comfortable without complaints, pain controlled. deliruim/confusion overnight  Denies CP, SOB, N/V, numbness/tingling     Vital Signs Last 24 Hrs  T(C): 36.4 (09-04-23 @ 06:05), Max: 36.4 (09-04-23 @ 06:05)  T(F): 97.5 (09-04-23 @ 06:05), Max: 97.5 (09-04-23 @ 06:05)  HR: 85 (09-04-23 @ 06:05) (85 - 85)  BP: 126/64 (09-04-23 @ 06:05) (126/64 - 126/64)  BP(mean): --  RR: 17 (09-04-23 @ 06:05) (17 - 17)  SpO2: --  I&O's Summary    03 Sep 2023 07:01  -  04 Sep 2023 07:00  --------------------------------------------------------  IN: 2510 mL / OUT: 1200 mL / NET: 1310 mL        General: Pt Alert and oriented, NAD  Aquacel DSG C/D/I  Pulses: 2+  Sensation: SILT  Motor: 5/5 EHL/FHL/TA/GS                          9.8    10.47 )-----------( 193      ( 04 Sep 2023 06:56 )             27.7     09-04    133<L>  |  99  |  15  ----------------------------<  131<H>  4.0   |  26  |  0.64    Ca    9.4      04 Sep 2023 06:56        A/P: 83yFemale POD#1 s/p L hip Hemiarthroplasty  - Stable  - Pain Control  - Delirium/confusion overnight  - Fu AM lab for sodium (yesterday 127; given 2g salt tabs); fu med rec  - Pending PT eval  - DVT ppx: SCDs, [id number]  - PT, WBS: WBAT, posterior precautions    Ortho Pager [phone]

## 2023-09-04 NOTE — PHYSICAL THERAPY INITIAL EVALUATION ADULT - PERTINENT HX OF CURRENT PROBLEM, REHAB EVAL
83F with hypothyroidism and anxiety who presented with left hip pain s/p mechanical fall. She was found to have left femoral neck fracture and admitted to orthopedic surgery for L hip hemiarthroplasty. POD#1 s/p left hip hemiarthroplasty.

## 2023-09-05 ENCOUNTER — TRANSCRIPTION ENCOUNTER (OUTPATIENT)
Age: 83
End: 2023-09-05

## 2023-09-05 LAB
ANION GAP SERPL CALC-SCNC: 6 MMOL/L — SIGNIFICANT CHANGE UP (ref 5–17)
APPEARANCE UR: ABNORMAL
BACTERIA # UR AUTO: PRESENT /HPF
BILIRUB UR-MCNC: NEGATIVE — SIGNIFICANT CHANGE UP
BUN SERPL-MCNC: 13 MG/DL — SIGNIFICANT CHANGE UP (ref 7–23)
CALCIUM SERPL-MCNC: 9.2 MG/DL — SIGNIFICANT CHANGE UP (ref 8.4–10.5)
CHLORIDE SERPL-SCNC: 98 MMOL/L — SIGNIFICANT CHANGE UP (ref 96–108)
CO2 SERPL-SCNC: 26 MMOL/L — SIGNIFICANT CHANGE UP (ref 22–31)
COLOR SPEC: YELLOW — SIGNIFICANT CHANGE UP
COMMENT - URINE: SIGNIFICANT CHANGE UP
CREAT SERPL-MCNC: 0.62 MG/DL — SIGNIFICANT CHANGE UP (ref 0.5–1.3)
DIFF PNL FLD: ABNORMAL
EGFR: 89 ML/MIN/1.73M2 — SIGNIFICANT CHANGE UP
EPI CELLS # UR: SIGNIFICANT CHANGE UP /HPF (ref 0–5)
GLUCOSE SERPL-MCNC: 131 MG/DL — HIGH (ref 70–99)
GLUCOSE UR QL: NEGATIVE — SIGNIFICANT CHANGE UP
HCT VFR BLD CALC: 26.1 % — LOW (ref 34.5–45)
HGB BLD-MCNC: 8.7 G/DL — LOW (ref 11.5–15.5)
KETONES UR-MCNC: NEGATIVE — SIGNIFICANT CHANGE UP
LEUKOCYTE ESTERASE UR-ACNC: ABNORMAL
MCHC RBC-ENTMCNC: 29.1 PG — SIGNIFICANT CHANGE UP (ref 27–34)
MCHC RBC-ENTMCNC: 33.3 GM/DL — SIGNIFICANT CHANGE UP (ref 32–36)
MCV RBC AUTO: 87.3 FL — SIGNIFICANT CHANGE UP (ref 80–100)
NITRITE UR-MCNC: NEGATIVE — SIGNIFICANT CHANGE UP
NRBC # BLD: 0 /100 WBCS — SIGNIFICANT CHANGE UP (ref 0–0)
OSMOLALITY UR: 115 MOSM/KG — LOW (ref 300–900)
PH UR: 6.5 — SIGNIFICANT CHANGE UP (ref 5–8)
PLATELET # BLD AUTO: 172 K/UL — SIGNIFICANT CHANGE UP (ref 150–400)
POTASSIUM SERPL-MCNC: 4.2 MMOL/L — SIGNIFICANT CHANGE UP (ref 3.5–5.3)
POTASSIUM SERPL-SCNC: 4.2 MMOL/L — SIGNIFICANT CHANGE UP (ref 3.5–5.3)
PROT UR-MCNC: NEGATIVE MG/DL — SIGNIFICANT CHANGE UP
RBC # BLD: 2.99 M/UL — LOW (ref 3.8–5.2)
RBC # FLD: 13.1 % — SIGNIFICANT CHANGE UP (ref 10.3–14.5)
RBC CASTS # UR COMP ASSIST: < 5 /HPF — SIGNIFICANT CHANGE UP
SODIUM SERPL-SCNC: 130 MMOL/L — LOW (ref 135–145)
SODIUM UR-SCNC: 20 MMOL/L — SIGNIFICANT CHANGE UP
SP GR SPEC: <=1.005 — SIGNIFICANT CHANGE UP (ref 1–1.03)
UROBILINOGEN FLD QL: 0.2 E.U./DL — SIGNIFICANT CHANGE UP
WBC # BLD: 7.99 K/UL — SIGNIFICANT CHANGE UP (ref 3.8–10.5)
WBC # FLD AUTO: 7.99 K/UL — SIGNIFICANT CHANGE UP (ref 3.8–10.5)
WBC UR QL: < 5 /HPF — SIGNIFICANT CHANGE UP

## 2023-09-05 PROCEDURE — 99233 SBSQ HOSP IP/OBS HIGH 50: CPT

## 2023-09-05 RX ORDER — SODIUM CHLORIDE 9 MG/ML
500 INJECTION INTRAMUSCULAR; INTRAVENOUS; SUBCUTANEOUS ONCE
Refills: 0 | Status: COMPLETED | OUTPATIENT
Start: 2023-09-05 | End: 2023-09-05

## 2023-09-05 RX ORDER — ASPIRIN/CALCIUM CARB/MAGNESIUM 324 MG
81 TABLET ORAL
Refills: 0 | Status: DISCONTINUED | OUTPATIENT
Start: 2023-09-05 | End: 2023-09-06

## 2023-09-05 RX ADMIN — POLYETHYLENE GLYCOL 3350 17 GRAM(S): 17 POWDER, FOR SOLUTION ORAL at 21:11

## 2023-09-05 RX ADMIN — Medication 1000 MILLIGRAM(S): at 05:47

## 2023-09-05 RX ADMIN — OXYCODONE HYDROCHLORIDE 5 MILLIGRAM(S): 5 TABLET ORAL at 03:14

## 2023-09-05 RX ADMIN — OXYCODONE HYDROCHLORIDE 5 MILLIGRAM(S): 5 TABLET ORAL at 22:11

## 2023-09-05 RX ADMIN — PANTOPRAZOLE SODIUM 40 MILLIGRAM(S): 20 TABLET, DELAYED RELEASE ORAL at 05:48

## 2023-09-05 RX ADMIN — Medication 1000 MILLIGRAM(S): at 22:11

## 2023-09-05 RX ADMIN — Medication 1000 MILLIGRAM(S): at 21:11

## 2023-09-05 RX ADMIN — SERTRALINE 25 MILLIGRAM(S): 25 TABLET, FILM COATED ORAL at 13:52

## 2023-09-05 RX ADMIN — Medication 1000 MILLIGRAM(S): at 14:09

## 2023-09-05 RX ADMIN — SENNA PLUS 2 TABLET(S): 8.6 TABLET ORAL at 21:11

## 2023-09-05 RX ADMIN — OXYCODONE HYDROCHLORIDE 5 MILLIGRAM(S): 5 TABLET ORAL at 21:11

## 2023-09-05 RX ADMIN — MAGNESIUM HYDROXIDE 30 MILLILITER(S): 400 TABLET, CHEWABLE ORAL at 14:03

## 2023-09-05 RX ADMIN — Medication 25 MICROGRAM(S): at 05:48

## 2023-09-05 RX ADMIN — SODIUM CHLORIDE 1000 MILLILITER(S): 9 INJECTION INTRAMUSCULAR; INTRAVENOUS; SUBCUTANEOUS at 14:01

## 2023-09-05 RX ADMIN — Medication 1000 MILLIGRAM(S): at 06:47

## 2023-09-05 RX ADMIN — Medication 1000 MILLIGRAM(S): at 13:52

## 2023-09-05 RX ADMIN — Medication 81 MILLIGRAM(S): at 17:27

## 2023-09-05 RX ADMIN — OXYCODONE HYDROCHLORIDE 5 MILLIGRAM(S): 5 TABLET ORAL at 04:14

## 2023-09-05 NOTE — OCCUPATIONAL THERAPY INITIAL EVALUATION ADULT - DIAGNOSIS, OT EVAL
Pt admitted to [facility] following mechanical fall at home, now POD2 s/p L JESSE performed on 9/3 Pt admitted to [facility] following mechanical fall at home, now POD2 s/p L JESSE performed on 9/3 presents with decreased ROM, strength and balance impacting ADL performance and functional transfers/mobility

## 2023-09-05 NOTE — DISCHARGE NOTE PROVIDER - CARE PROVIDER_API CALL
Charlie Arita  Orthopaedic Surgery  [address] [place] [address]  Phone: [phone]  Fax: [phone])-  Follow Up Time: 2 weeks

## 2023-09-05 NOTE — DISCHARGE NOTE PROVIDER - NSDCCPCAREPLAN_GEN_ALL_CORE_FT
PRINCIPAL DISCHARGE DIAGNOSIS  Diagnosis: Femoral neck fracture  Assessment and Plan of Treatment: left hip hemiarthroplasty

## 2023-09-05 NOTE — DISCHARGE NOTE PROVIDER - NSDCCPTREATMENT_GEN_ALL_CORE_FT
PRINCIPAL PROCEDURE  Procedure: Hemiarthroplasty, hip  Findings and Treatment: left femoral neck fracture

## 2023-09-05 NOTE — OCCUPATIONAL THERAPY INITIAL EVALUATION ADULT - ADDITIONAL COMMENTS
Pt R handed and wears glasses to read. Pt lives with  who assists with ADL as needed in elevator accessible apartment. Pt typically transfers self to/from wheelchair with RW independently. Pt owns standard wc, electric wc, RW, cane, shower chair.

## 2023-09-05 NOTE — OCCUPATIONAL THERAPY INITIAL EVALUATION ADULT - MODIFIED CLINICAL TEST OF SENSORY INTEGRATION IN BALANCE TEST
Pt tolerated 1 bout of standing for approx 4 mins, demonstrating 3 shuffle steps to L with RW and facilitation to weight shift laterally to offload affected LE to advance

## 2023-09-05 NOTE — DISCHARGE NOTE PROVIDER - NSDCMRMEDTOKEN_GEN_ALL_CORE_FT
cefpodoxime 200 mg oral tablet: 1 tab(s) orally 2 times a day   acetaminophen 500 mg oral tablet: 2 tab(s) orally every 8 hours  aspirin 81 mg oral delayed release tablet: 1 tab(s) orally 2 times a day  levothyroxine 25 mcg (0.025 mg) oral tablet: 1 tab(s) orally once a day  oxyCODONE 5 mg oral tablet: 1 tab(s) orally every 4 hours As needed Moderate Pain (4 - 6)  pantoprazole 40 mg oral delayed release tablet: 1 tab(s) orally once a day (before a meal)  senna leaf extract oral tablet: 2 tab(s) orally once a day (at bedtime)  sertraline 25 mg oral tablet: 1 tab(s) orally once a day   acetaminophen 500 mg oral tablet: 2 tab(s) orally every 8 hours  aspirin 81 mg oral delayed release tablet: 1 tab(s) orally 2 times a day  ergocalciferol 1.25 mg (50,000 intl units) oral capsule: 50,000 international unit(s) orally every 7 days RECHECK AFTER 8 WEEKS  levothyroxine 25 mcg (0.025 mg) oral tablet: 1 tab(s) orally once a day  oxyCODONE 5 mg oral tablet: 1 tab(s) orally every 4 hours As needed Moderate Pain (4 - 6)  pantoprazole 40 mg oral delayed release tablet: 1 tab(s) orally once a day (before a meal)  senna leaf extract oral tablet: 2 tab(s) orally once a day (at bedtime)  sertraline 25 mg oral tablet: 1 tab(s) orally once a day

## 2023-09-05 NOTE — OCCUPATIONAL THERAPY INITIAL EVALUATION ADULT - LEVEL OF INDEPENDENCE: SIT/STAND, REHAB EVAL
minx1 modx1/minimum assist (75% patients effort)/moderate assist (50% patients effort) moderate assist (50% patients effort)

## 2023-09-05 NOTE — OCCUPATIONAL THERAPY INITIAL EVALUATION ADULT - LEVEL OF INDEPENDENCE: STAND/SIT, REHAB EVAL
min x1 mod x1/minimum assist (75% patients effort)/moderate assist (50% patients effort) moderate assist (50% patients effort)

## 2023-09-05 NOTE — OCCUPATIONAL THERAPY INITIAL EVALUATION ADULT - NS ASR WT BEARING DETAIL LLE
Follow up phone call after D & C.  Pt doing well, cramps were minimal today.  Bleeding very light.  No concerns.  Will call if any questions.   weight-bearing as tolerated

## 2023-09-05 NOTE — PROGRESS NOTE ADULT - SUBJECTIVE AND OBJECTIVE BOX
Ortho Note    Pt seen and examined during AM rounds  Pt lying in bed with Son at bedside. Patients has not had a bowel movement yet but is passing gas.   Denies CP, SOB, N/V, numbness/tingling     Vital Signs Last 24 Hrs  T(C): 36.6 (09-05-23 @ 08:11), Max: 36.6 (09-05-23 @ 08:11)  T(F): 97.8 (09-05-23 @ 08:11), Max: 97.8 (09-05-23 @ 08:11)  HR: 75 (09-05-23 @ 08:11) (75 - 75)  BP: 117/72 (09-05-23 @ 08:11) (117/72 - 117/72)  BP(mean): --  RR: 17 (09-05-23 @ 08:11) (17 - 17)  SpO2: 99% (09-05-23 @ 08:11) (99% - 99%)  I&O's Summary    04 Sep 2023 07:01  -  05 Sep 2023 07:00  --------------------------------------------------------  IN: 1490 mL / OUT: 1700 mL / NET: -210 mL    05 Sep 2023 07:01  -  05 Sep 2023 13:06  --------------------------------------------------------  IN: 120 mL / OUT: 0 mL / NET: 120 mL      PE  General: Pt Alert and oriented, NAD  DSG aquacel clean, dry, and intact  Pulses: +2 DP palpable pulses, brisk cap refill  Sensation: SILT b/l lower extremities  Motor: EHL/FHL/TA/GS 5/5                          8.7    7.99  )-----------( 172      ( 05 Sep 2023 10:16 )             26.1     09-05    130<L>  |  98  |  13  ----------------------------<  131<H>  4.2   |  26  |  0.62    Ca    9.2      05 Sep 2023 10:16    A/P: 83yFemale POD#2 s/p left hip hemiarthroplasty  - Stable  - Medicine co-management recommendations appreciated  - Labs: AM labs Na 130(L), urine osm 115 (L), and urine na 20   - Pain Control, oxycodone 2.5 moderate pain prn q4hr and oxycodone 5mg severe pain q4hrs  - DVT ppx: asa 81mg bid  - PT, WBS: wbat with posterior hip precautions   - OT consulted   - NC 2L overnight, plan to titrate off to RA   - encouraged OOB and IS   - encouraged applying ice to left hip q4hrs   DISPO: accepted to [facility] rehab pending authorization     Ortho Pager [phone] Ortho Note    Pt seen and examined during AM rounds  Pt lying in bed with Son at bedside. Patients has not had a bowel movement yet but is passing gas.   Denies CP, SOB, N/V, numbness/tingling     Vital Signs Last 24 Hrs  T(C): 36.6 (09-05-23 @ 08:11), Max: 36.6 (09-05-23 @ 08:11)  T(F): 97.8 (09-05-23 @ 08:11), Max: 97.8 (09-05-23 @ 08:11)  HR: 75 (09-05-23 @ 08:11) (75 - 75)  BP: 117/72 (09-05-23 @ 08:11) (117/72 - 117/72)  BP(mean): --  RR: 17 (09-05-23 @ 08:11) (17 - 17)  SpO2: 99% (09-05-23 @ 08:11) (99% - 99%)  I&O's Summary    04 Sep 2023 07:01  -  05 Sep 2023 07:00  --------------------------------------------------------  IN: 1490 mL / OUT: 1700 mL / NET: -210 mL    05 Sep 2023 07:01  -  05 Sep 2023 13:06  --------------------------------------------------------  IN: 120 mL / OUT: 0 mL / NET: 120 mL      PE  General: Pt Alert and oriented, NAD  DSG aquacel clean, dry, and intact  Pulses: +2 DP palpable pulses, brisk cap refill  Sensation: SILT b/l lower extremities  Motor: EHL/FHL/TA/GS 5/5                          8.7    7.99  )-----------( 172      ( 05 Sep 2023 10:16 )             26.1     09-05    130<L>  |  98  |  13  ----------------------------<  131<H>  4.2   |  26  |  0.62    Ca    9.2      05 Sep 2023 10:16    A/P: 83yFemale POD#2 s/p left hip hemiarthroplasty  - Stable  - Medicine co-management recommendations appreciated  - Labs: AM labs Na 130(L), urine osm 115 (L), and urine na 20, 500cc 0.9% NaCl bolus x1 and encourage PO intake   - Pain Control, oxycodone 2.5 moderate pain prn q4hr and oxycodone 5mg severe pain q4hrs  - DVT ppx: asa 81mg bid  - PT, WBS: wbat with posterior hip precautions   - OT consulted   - NC 2L overnight, plan to titrate off to RA   - encouraged OOB and IS   - encouraged applying ice to left hip q4hrs   DISPO: accepted to [facility] rehab pending authorization     Ortho Pager [phone]

## 2023-09-05 NOTE — PROGRESS NOTE ADULT - SUBJECTIVE AND OBJECTIVE BOX
INTERVAL HPI/OVERNIGHT EVENTS: nirav o/n    SUBJECTIVE: Patient seen and examined at bedside.   Pt's son at bedside  Pt reports feeling uncomfortable lyn in L thigh - more pain w movement. Feels anxious about working w PT but reassured she will get PRN pain medication before therapy session. Eating and drinking wo N/V/Abd pain. +Flatus wo BM. Voiding. No fever, chest pain, dyspnea, LH, dizziness.    OBJECTIVE:    VITAL SIGNS:  ICU Vital Signs Last 24 Hrs  T(C): 36.6 (05 Sep 2023 08:11), Max: 37.2 (04 Sep 2023 20:19)  T(F): 97.8 (05 Sep 2023 08:11), Max: 98.9 (04 Sep 2023 20:19)  HR: 75 (05 Sep 2023 08:11) (75 - 96)  BP: 117/72 (05 Sep 2023 08:11) (99/65 - 121/81)  BP(mean): --  ABP: --  ABP(mean): --  RR: 17 (05 Sep 2023 08:11) (17 - 18)  SpO2: 99% (05 Sep 2023 08:11) (96% - 99%)    O2 Parameters below as of 05 Sep 2023 08:11  Patient On (Oxygen Delivery Method): nasal cannula  O2 Flow (L/min): 2            09-04 @ 07:01  -  09-05 @ 07:00  --------------------------------------------------------  IN: 1490 mL / OUT: 1700 mL / NET: -210 mL    09-05 @ 07:01  -  09-05 @ 15:00  --------------------------------------------------------  IN: 120 mL / OUT: 0 mL / NET: 120 mL      CAPILLARY BLOOD GLUCOSE          PHYSICAL EXAM:  GEN: female in NAD on RA  HEENT: NC/AT, MMM  CV: RRR, nml S1S2, no murmurs  PULM: nml effort, CTAB  ABD: Soft, non-distended, NABS, non-tender  NEURO  A/O x3,   L thigh dressing c/d/i. 5/5 in b/l plantarflex/ext. 3/5 - in L hip flexion - limited d/t pain.   PSYCH: Appropriate      MEDICATIONS:  MEDICATIONS  (STANDING):  acetaminophen     Tablet .. 1000 milliGRAM(s) Oral every 8 hours  aspirin enteric coated 81 milliGRAM(s) Oral two times a day  levothyroxine 25 MICROGram(s) Oral daily  pantoprazole    Tablet 40 milliGRAM(s) Oral before breakfast  polyethylene glycol 3350 17 Gram(s) Oral at bedtime  senna 2 Tablet(s) Oral at bedtime  sertraline 25 milliGRAM(s) Oral daily    MEDICATIONS  (PRN):  magnesium hydroxide Suspension 30 milliLiter(s) Oral daily PRN Constipation  melatonin 3 milliGRAM(s) Oral at bedtime PRN Sleep  ondansetron Injectable 4 milliGRAM(s) IV Push every 6 hours PRN Nausea and/or Vomiting  oxyCODONE    IR 5 milliGRAM(s) Oral every 4 hours PRN Moderate Pain (4 - 6)  oxyCODONE    IR 2.5 milliGRAM(s) Oral every 4 hours PRN Mild Pain (1 - 3)      ALLERGIES:  Allergies    No Known Allergies    Intolerances        LABS:                        8.7    7.99  )-----------( 172      ( 05 Sep 2023 10:16 )             26.1     09-05    130<L>  |  98  |  13  ----------------------------<  131<H>  4.2   |  26  |  0.62    Ca    9.2      05 Sep 2023 10:16        Urinalysis Basic - ( 05 Sep 2023 10:16 )    Color: x / Appearance: x / SG: x / pH: x  Gluc: 131 mg/dL / Ketone: x  / Bili: x / Urobili: x   Blood: x / Protein: x / Nitrite: x   Leuk Esterase: x / RBC: x / WBC x   Sq Epi: x / Non Sq Epi: x / Bacteria: x        RADIOLOGY & ADDITIONAL TESTS: Reviewed.

## 2023-09-05 NOTE — DISCHARGE NOTE PROVIDER - NSDCFUADDINST_GEN_ALL_CORE_FT
ACTIVITY:   - Weight bear as tolerated with assistive device. No strenuous activity, heavy lifting, driving or returning to work until cleared by MD.   - Apply a cold compress to the surgical site several times daily to reduce pain and swelling. For icing, twenty-minute sessions followed by an hour off is recommended. You should ice as frequently as possible. Ice should NEVER be placed directly on the skin. Wearing compression stockings during the first week after surgery can help reduce swelling in your knee, calf and foot, but is not required.     POSTERIOR HIP REPLACEMENTS  Hip precautions:   The following precautions will remain in effect for 6 weeks following surgery:   · Do not cross your knees.   · Do not flex your hip (i.e., bring your knee toward your chest) beyond 90 degrees.   · Use a raised toilet seat. Do not use low chairs or couches.   · Sleep with a pillow between your knees.   · Do not reach down to  items on the floor.     DRESSING/SHOWERING: AQUACEL – brown gel dressing  - You may shower, your dressing is water-resistant. Do not soak in bathtubs. Remove dressing after postop day 7, then leave incision open to air. You may do this yourself (simply peel it off), or you may ask for assistance from your visiting nurse. Keep your incision clean and dry. Do not pick at your incision. Do not apply creams, ointments or oils to your incision until cleared by your surgeon. Do not soak your incision in sitting water (ie tubs, pools, lakes, etc.) until cleared by your surgeon. Do not scrub the incision – instead, allow soap and water to flow over the incision and then pat it dry with a clean towel.   MEDICATION/ANTICOAGULATION:   -You have been prescribed Aspirin 81mg twice a day, as a preventative to help prevent postoperative blood clots. Please take this medication as prescribed.    - You have been prescribed medications for pain:     - Tylenol for mild to moderate pain. Do not exceed 3,000mg daily.     - For more severe pain, take Tylenol with the addition of narcotic pain medication. Take this medication as prescribed. This medication may cause drowsiness or dizziness. Do not operate machinery. This medication may cause constipation.   - For any additional medications, follow instructions on the bottle.    -Try to have regular bowel movements. Take stool softener or laxative if necessary. You may wish to take Miralax daily until you have regular bowel movements.    - If you have been prescribed Aspirin or an anti-inflammatory, please take prilosec (omeprazole) once a day, before breakfast, until no longer taking Aspirin or anti-inflammatory. This will help protect your stomach.   - If you have a pain management physician, please follow-up with them postoperatively.    - If you experience any negative side effects of your medications, please call your surgeon's office to discuss.      FOLLOW-UP:   - Call to schedule an appt with Dr. Arita for follow up.   - Please follow-up with your primary care physician or any other specialist you see postoperatively, if needed.    - Contact your doctor or go to the emergency room if you experience: fever greater than 101.5, chills, chest pain, difficulty breathing, redness or excessive drainage around the incision, other concerns.  ACTIVITY:   - Weight bear as tolerated with assistive device. No strenuous activity, heavy lifting, driving or returning to work until cleared by MD.   - Apply a cold compress to the surgical site several times daily to reduce pain and swelling. For icing, twenty-minute sessions followed by an hour off is recommended. You should ice as frequently as possible. Ice should NEVER be placed directly on the skin. Wearing compression stockings during the first week after surgery can help reduce swelling in your knee, calf and foot, but is not required.     POSTERIOR HIP REPLACEMENTS  Hip precautions:   The following precautions will remain in effect for 6 weeks following surgery:   · Do not cross your knees.   · Do not flex your hip (i.e., bring your knee toward your chest) beyond 90 degrees.   · Use a raised toilet seat. Do not use low chairs or couches.   · Sleep with a pillow between your knees.   · Do not reach down to  items on the floor.     DRESSING/SHOWERING: AQUACEL – brown gel dressing  - You may shower, your dressing is water-resistant. Do not soak in bathtubs. Remove dressing after postop day 7, then leave incision open to air. You may do this yourself (simply peel it off), or you may ask for assistance from your visiting nurse. Keep your incision clean and dry. Do not pick at your incision. Do not apply creams, ointments or oils to your incision until cleared by your surgeon. Do not soak your incision in sitting water (ie tubs, pools, lakes, etc.) until cleared by your surgeon. Do not scrub the incision – instead, allow soap and water to flow over the incision and then pat it dry with a clean towel.   MEDICATION/ANTICOAGULATION:   -You have been prescribed Aspirin 81mg twice a day for 30 days, as a preventative to help prevent postoperative blood clots. Please take this medication as prescribed.    - You have been prescribed medications for pain:     - Tylenol for mild to moderate pain. Do not exceed 3,000mg daily.     - For more severe pain, take Tylenol with the addition of narcotic pain medication. Take this medication as prescribed. This medication may cause drowsiness or dizziness. Do not operate machinery. This medication may cause constipation.   - For any additional medications, follow instructions on the bottle.    -Try to have regular bowel movements. Take stool softener or laxative if necessary. You may wish to take Miralax daily until you have regular bowel movements.    - If you have been prescribed Aspirin or an anti-inflammatory, please take prilosec (omeprazole) once a day, before breakfast, until no longer taking Aspirin or anti-inflammatory. This will help protect your stomach.   - If you have a pain management physician, please follow-up with them postoperatively.    - If you experience any negative side effects of your medications, please call your surgeon's office to discuss.      FOLLOW-UP:   - Call to schedule an appt with Dr. Arita for follow up.   - Please follow-up with your primary care physician or any other specialist you see postoperatively, if needed.    - Contact your doctor or go to the emergency room if you experience: fever greater than 101.5, chills, chest pain, difficulty breathing, redness or excessive drainage around the incision, other concerns.

## 2023-09-05 NOTE — OCCUPATIONAL THERAPY INITIAL EVALUATION ADULT - GENERAL OBSERVATIONS, REHAB EVAL
Pt cleared by BRITTNEY Perez for OOB with OT. Pt received semisupine +IV +primafit +abduction pillow +bilat SCD +ice to L hip +PT student Lorena +family at bedside. Pt left as found with all needs met and lines intact, RN aware. Pt cleared by BRITTNEY Perez for OOB with OT. Pt received semisupine +IV +primafit +abduction pillow +bilat SCD +PT student Lorena +family at bedside. Pt left as found with all needs met and lines intact, RN aware.

## 2023-09-05 NOTE — DISCHARGE NOTE PROVIDER - HOSPITAL COURSE
Admit to Orthopaedics  Surgery left hip hemiarthroplasty on 9/3  Perioperative Antibiotics  DVT prophylaxis  Physical Therapy and Occupational Therapy   Pain Management     9/1: Arrived to the ED s/p mechanical fall, found to have a left femoral neck fracture on XR. Admitted to Orthopedic service. Admit to Orthopaedics s/p mechanical fall, found to have left femoral neck fracture on XR   Medical optimization   Surgery left hip hemiarthroplasty on 9/3  Perioperative Antibiotics  DVT prophylaxis- Aspirin 81 BID  Medical comanagement- recs 36334 IU vitamin D and recheck in 8 weeks   Physical Therapy and Occupational Therapy   Pain Management   Dispo for NELIDA placement, accepted to [facility] Admit to Orthopaedics s/p mechanical fall, found to have left femoral neck fracture on XR   Medical optimization   Surgery left hip hemiarthroplasty on 9/3  Perioperative Antibiotics  DVT prophylaxis- Aspirin 81 BID  Medical comanagement- recs 07223 IU vitamin D and recheck in 8 weeks   Physical Therapy and Occupational Therapy   Pain Management   Vitamin D 50,000IU started on 9/6, take weekly and recheck in 8 weeks   Dispo for NELIDA placement, accepted to [facility]

## 2023-09-06 ENCOUNTER — TRANSCRIPTION ENCOUNTER (OUTPATIENT)
Age: 83
End: 2023-09-06

## 2023-09-06 VITALS — WEIGHT: 121.92 LBS

## 2023-09-06 LAB
24R-OH-CALCIDIOL SERPL-MCNC: 16.9 NG/ML — LOW (ref 30–80)
ANION GAP SERPL CALC-SCNC: 3 MMOL/L — LOW (ref 5–17)
BUN SERPL-MCNC: 12 MG/DL — SIGNIFICANT CHANGE UP (ref 7–23)
CALCIUM SERPL-MCNC: 9.1 MG/DL — SIGNIFICANT CHANGE UP (ref 8.4–10.5)
CHLORIDE SERPL-SCNC: 103 MMOL/L — SIGNIFICANT CHANGE UP (ref 96–108)
CO2 SERPL-SCNC: 27 MMOL/L — SIGNIFICANT CHANGE UP (ref 22–31)
CREAT SERPL-MCNC: 0.62 MG/DL — SIGNIFICANT CHANGE UP (ref 0.5–1.3)
EGFR: 89 ML/MIN/1.73M2 — SIGNIFICANT CHANGE UP
GLUCOSE SERPL-MCNC: 125 MG/DL — HIGH (ref 70–99)
HCT VFR BLD CALC: 27.8 % — LOW (ref 34.5–45)
HGB BLD-MCNC: 9.1 G/DL — LOW (ref 11.5–15.5)
MCHC RBC-ENTMCNC: 29 PG — SIGNIFICANT CHANGE UP (ref 27–34)
MCHC RBC-ENTMCNC: 32.7 GM/DL — SIGNIFICANT CHANGE UP (ref 32–36)
MCV RBC AUTO: 88.5 FL — SIGNIFICANT CHANGE UP (ref 80–100)
NRBC # BLD: 0 /100 WBCS — SIGNIFICANT CHANGE UP (ref 0–0)
PLATELET # BLD AUTO: 224 K/UL — SIGNIFICANT CHANGE UP (ref 150–400)
POTASSIUM SERPL-MCNC: 4.2 MMOL/L — SIGNIFICANT CHANGE UP (ref 3.5–5.3)
POTASSIUM SERPL-SCNC: 4.2 MMOL/L — SIGNIFICANT CHANGE UP (ref 3.5–5.3)
RBC # BLD: 3.14 M/UL — LOW (ref 3.8–5.2)
RBC # FLD: 13.1 % — SIGNIFICANT CHANGE UP (ref 10.3–14.5)
SODIUM SERPL-SCNC: 133 MMOL/L — LOW (ref 135–145)
WBC # BLD: 7.73 K/UL — SIGNIFICANT CHANGE UP (ref 3.8–10.5)
WBC # FLD AUTO: 7.73 K/UL — SIGNIFICANT CHANGE UP (ref 3.8–10.5)

## 2023-09-06 PROCEDURE — 85610 PROTHROMBIN TIME: CPT

## 2023-09-06 PROCEDURE — 73502 X-RAY EXAM HIP UNI 2-3 VIEWS: CPT

## 2023-09-06 PROCEDURE — 72170 X-RAY EXAM OF PELVIS: CPT

## 2023-09-06 PROCEDURE — 99285 EMERGENCY DEPT VISIT HI MDM: CPT

## 2023-09-06 PROCEDURE — 36415 COLL VENOUS BLD VENIPUNCTURE: CPT

## 2023-09-06 PROCEDURE — 82306 VITAMIN D 25 HYDROXY: CPT

## 2023-09-06 PROCEDURE — 85027 COMPLETE CBC AUTOMATED: CPT

## 2023-09-06 PROCEDURE — 81001 URINALYSIS AUTO W/SCOPE: CPT

## 2023-09-06 PROCEDURE — 83935 ASSAY OF URINE OSMOLALITY: CPT

## 2023-09-06 PROCEDURE — 80048 BASIC METABOLIC PNL TOTAL CA: CPT

## 2023-09-06 PROCEDURE — 85730 THROMBOPLASTIN TIME PARTIAL: CPT

## 2023-09-06 PROCEDURE — 86850 RBC ANTIBODY SCREEN: CPT

## 2023-09-06 PROCEDURE — 71045 X-RAY EXAM CHEST 1 VIEW: CPT

## 2023-09-06 PROCEDURE — 96375 TX/PRO/DX INJ NEW DRUG ADDON: CPT

## 2023-09-06 PROCEDURE — 86900 BLOOD TYPING SEROLOGIC ABO: CPT

## 2023-09-06 PROCEDURE — 73552 X-RAY EXAM OF FEMUR 2/>: CPT

## 2023-09-06 PROCEDURE — 84300 ASSAY OF URINE SODIUM: CPT

## 2023-09-06 PROCEDURE — 97116 GAIT TRAINING THERAPY: CPT

## 2023-09-06 PROCEDURE — C1776: CPT

## 2023-09-06 PROCEDURE — 97161 PT EVAL LOW COMPLEX 20 MIN: CPT

## 2023-09-06 PROCEDURE — 97165 OT EVAL LOW COMPLEX 30 MIN: CPT

## 2023-09-06 PROCEDURE — 97110 THERAPEUTIC EXERCISES: CPT

## 2023-09-06 PROCEDURE — 86901 BLOOD TYPING SEROLOGIC RH(D): CPT

## 2023-09-06 PROCEDURE — 99233 SBSQ HOSP IP/OBS HIGH 50: CPT

## 2023-09-06 PROCEDURE — 97530 THERAPEUTIC ACTIVITIES: CPT

## 2023-09-06 PROCEDURE — 96374 THER/PROPH/DIAG INJ IV PUSH: CPT

## 2023-09-06 RX ORDER — ACETAMINOPHEN 500 MG
2 TABLET ORAL
Qty: 0 | Refills: 0 | DISCHARGE
Start: 2023-09-06

## 2023-09-06 RX ORDER — PANTOPRAZOLE SODIUM 20 MG/1
1 TABLET, DELAYED RELEASE ORAL
Qty: 0 | Refills: 0 | DISCHARGE
Start: 2023-09-06

## 2023-09-06 RX ORDER — ERGOCALCIFEROL 1.25 MG/1
50000 CAPSULE ORAL ONCE
Refills: 0 | Status: DISCONTINUED | OUTPATIENT
Start: 2023-09-06 | End: 2023-09-06

## 2023-09-06 RX ORDER — SENNA PLUS 8.6 MG/1
2 TABLET ORAL
Qty: 0 | Refills: 0 | DISCHARGE
Start: 2023-09-06

## 2023-09-06 RX ORDER — LEVOTHYROXINE SODIUM 125 MCG
1 TABLET ORAL
Qty: 0 | Refills: 0 | DISCHARGE
Start: 2023-09-06

## 2023-09-06 RX ORDER — OXYCODONE HYDROCHLORIDE 5 MG/1
1 TABLET ORAL
Qty: 0 | Refills: 0 | DISCHARGE
Start: 2023-09-06

## 2023-09-06 RX ORDER — ERGOCALCIFEROL 1.25 MG/1
50000 CAPSULE ORAL
Qty: 0 | Refills: 0 | DISCHARGE
Start: 2023-09-06

## 2023-09-06 RX ORDER — ASPIRIN/CALCIUM CARB/MAGNESIUM 324 MG
1 TABLET ORAL
Qty: 0 | Refills: 0 | DISCHARGE
Start: 2023-09-06

## 2023-09-06 RX ORDER — SERTRALINE 25 MG/1
1 TABLET, FILM COATED ORAL
Qty: 0 | Refills: 0 | DISCHARGE
Start: 2023-09-06

## 2023-09-06 RX ADMIN — Medication 1000 MILLIGRAM(S): at 05:14

## 2023-09-06 RX ADMIN — Medication 1000 MILLIGRAM(S): at 14:46

## 2023-09-06 RX ADMIN — PANTOPRAZOLE SODIUM 40 MILLIGRAM(S): 20 TABLET, DELAYED RELEASE ORAL at 05:15

## 2023-09-06 RX ADMIN — SERTRALINE 25 MILLIGRAM(S): 25 TABLET, FILM COATED ORAL at 11:25

## 2023-09-06 RX ADMIN — Medication 1000 MILLIGRAM(S): at 06:14

## 2023-09-06 RX ADMIN — Medication 25 MICROGRAM(S): at 05:15

## 2023-09-06 RX ADMIN — Medication 1000 MILLIGRAM(S): at 13:22

## 2023-09-06 RX ADMIN — Medication 81 MILLIGRAM(S): at 05:15

## 2023-09-06 RX ADMIN — Medication 81 MILLIGRAM(S): at 17:16

## 2023-09-06 NOTE — DIETITIAN INITIAL EVALUATION ADULT - PERTINENT LABORATORY DATA
09-06    133<L>  |  103  |  12  ----------------------------<  125<H>  4.2   |  27  |  0.62    Ca    9.1      06 Sep 2023 07:08

## 2023-09-06 NOTE — DIETITIAN INITIAL EVALUATION ADULT - OTHER CALCULATIONS
Estimated needs based on CBW as unable to obtain IBW at this time. Needs adjusted for age and status post OR.   Defer fluids to team.

## 2023-09-06 NOTE — DIETITIAN INITIAL EVALUATION ADULT - ADD RECOMMEND
1. Continue Regular diet as ordered.   >>Encourage & monitor PO intake. Stuart dietary preferences as able.   2. Monitor GI tolerance, weight trends, labs, & skin integrity.  3. Defer bowel and pain regimens to team.   4. RD to remain available for diet education/intervention prn.

## 2023-09-06 NOTE — DIETITIAN INITIAL EVALUATION ADULT - OTHER INFO
83F w hypothyroidism, anxiety, p/w mechanical fall, found to have L FNF, s/p L hip hemiarthroplasty 9/3.    On assessment, pt resting in bed with son at bedside. Labs and medication orders reviewed. Ordered for PO synthroid. Na 133 <L>. On Regular diet. Pt drowsy at time of visit, interview deferred to son. Reports pt with good appetite and intake. Reports UBW ~120lb consistent with admission wt 122lb/55.3kg. Denies pt with nausea/vomiting/diarrhea, endorses constipation, last recorded BM 9/3 - bowel regimen ordered. Denies pt with difficulty chewing/swallowing. Confirms NKFA. No Nondenominational/ethnic/cultural food preferences noted. No pressure injuries, 1+ left hip edema documented, Amado score 17. See nutrition recommendations. RD to follow-up per protocol.

## 2023-09-06 NOTE — PROGRESS NOTE ADULT - PROVIDER SPECIALTY LIST ADULT
Orthopedics
Orthopedics
Hospitalist
Orthopedics
Orthopedics
Hospitalist
Hospitalist
Orthopedics
Orthopedics
Hospitalist
Hospitalist

## 2023-09-06 NOTE — PROGRESS NOTE ADULT - ASSESSMENT
83F with hypothyroidism and anxiety who presented with left hip pain s/p mechanical fall. She was found to have left femoral neck fracture and admitted to orthopedic surgery for L hip hemiarthroplasty. POD#0 s/p left hip hemiarthroplasty    #Post-operative state  - POD#0 s/p left hip hemiarthroplasty (OR course uneventful)  - Pain control  - OOB as tolerated, incentive spirometry  - Management as per primary team    #Hypothyroidism  - Continue synthroid    #Anxiety  - Continue sertraline    #Elevated BP  - Likely related to pain. If continues to be elevated despite adequate pain control, will consider starting antihypertensives
83F with hypothyroidism and anxiety who presented with left hip pain s/p mechanical fall. She was found to have left femoral neck fracture and admitted to orthopedic surgery for L hip hemiarthroplasty. POD#1 s/p left hip hemiarthroplasty.    #Post-operative state  - POD#1 s/p left hip hemiarthroplasty (OR course uneventful)  - Pain control with Tylenol 1g q8hrs standing, Oxy IR 5/10 q4hrs PRN for mod/severe pain  - Pending PT evaluation  - OOB as tolerated, incentive spirometry, bowel regimen  - DVT ppx with SCDs  - Management as per primary team    #Hypothyroidism  - Continue synthroid    #Anxiety  - Continue sertraline  
PCP: Requesting new PCP  83F w hypothyroidism, anxiety, p/w mechanical fall, found to have L FNF, s/p L hip hemiarthroplasty 9/3 w Dr. Arita, for NELIDA    #Post-op state - pain controlled. PPx: ASA 81 BID, on bowel regimen and incentive spirometer  #L Hip FNF  #Osteoporosis   - scheduled tylenol 1g q8,    - PRN: Oxycodone 2.5/5 q4h for mod/severe pain    #Anxiety - c/w home sertraline 3mg daily  #Blood loss anemia - hgb 8.7. Was 12.1 on admission. Likely d/t fx and OR. Asymptomatic  #Hypothyroidism - c/w home synthroid  #Hyponatremia 130 from 133 from 127. Was 132 on admission   - obtain UA, Urine osm, urine Na    Plan  Enma shows pre-renal hyponatremia - Enma 20. Encourage PO/Fluid intake. Will give 500cc NS  BMP in AM  Adding on 25,OH-D   Continue PT - pt will need pre-medication w oxycodone    DISPO: NELIDA - kei needed  Outpatient: will refer to [facility] to establish PCP: [address] [phone]
PCP: Requesting new PCP  83F w hypothyroidism, anxiety, p/w mechanical fall, found to have L FNF, s/p L hip hemiarthroplasty 9/3 w Dr. Arita, for [facility]ELIDA    #Post-op state - pain controlled. PPx: ASA 81 BID, on bowel regimen and incentive spirometer  #L Hip FNF  #Osteoporosis   - scheduled tylenol 1g q8,    - PRN: Oxycodone 2.5/5 q4h for mod/severe pain  #Vitamin D deficiency - 25,OH-D 14. See below    #Anxiety - c/w home sertraline 3mg daily  #Blood loss anemia - hgb 9.1 from 8.7. Was 12.1 on admission. Likely d/t fx and OR. Asymptomatic  #Hypothyroidism - c/w home synthroid  #Hyponatremia 133 today from 130. Was 132 on admission. Pre-renal per Enma 20    Plan  Starting 50,000 IU Vitamin D q Weekly q8wk. recheck level in 8 weeks  Optimized for NELIDA today.     DISPO: NELIDA - auth needed  Outpatient: will refer to [facility] to establish PCP: [address] [phone]  
83F with hypothyroidism and anxiety who presented with left hip pain s/p mechanical fall. She was found to have left femoral neck fracture and admitted to orthopedic surgery for L hip hemiarthroplasty. Medicine following for co-management.    #L femoral neck fracture  - Plan for OR tomorrow  - NPO with labs  - Management as per primary team    #Hypothyroidism  - Continue synthroid    #Anxiety  - Continue sertraline    #Elevated BP  - If continues to be elevated despite adequate pain control, will consider starting antihypertensives

## 2023-09-06 NOTE — PROGRESS NOTE ADULT - SUBJECTIVE AND OBJECTIVE BOX
POST OPERATIVE DAY # 3 left hip hemiarthroplasty   SUBJECTIVE: Patient seen and examined.  Pt without complaints.   Denies chest pain/SOB/dizziness/n/v/HA   Pain well controlled.       OBJECTIVE:     Vital Signs Last 24 Hrs  T(C): 36.7 (06 Sep 2023 08:19), Max: 37.3 (05 Sep 2023 20:59)  T(F): 98 (06 Sep 2023 08:19), Max: 99.2 (05 Sep 2023 20:59)  HR: 82 (06 Sep 2023 08:19) (82 - 108)  BP: 143/81 (06 Sep 2023 08:19) (137/74 - 153/83)  BP(mean): --  RR: 16 (06 Sep 2023 08:19) (16 - 17)  SpO2: 93% (06 Sep 2023 08:19) (91% - 99%)  Parameters below as of 06 Sep 2023 08:19  Patient On (Oxygen Delivery Method): room air        PE:  Pleasant, alert, comfortable          Dressing: clean/dry/intact - aquacel left hip with dried/old stainage in center, no active drdainage          Sensation: intact to light touch to patient's baseline BLE          Motor exam:  firing ehl/ta/gs/fhl 5/5 BLE. Negative ramses bilaterally.          Skin warm, well-perfused; capillary refill brisk BLE              LABS:                        9.1    7.73  )-----------( 224      ( 06 Sep 2023 07:08 )             27.8     09-06    133<L>  |  103  |  12  ----------------------------<  125<H>  4.2   |  27  |  0.62    Ca    9.1      06 Sep 2023 07:08        Urinalysis Basic - ( 06 Sep 2023 07:08 )    Color: x / Appearance: x / SG: x / pH: x  Gluc: 125 mg/dL / Ketone: x  / Bili: x / Urobili: x   Blood: x / Protein: x / Nitrite: x   Leuk Esterase: x / RBC: x / WBC x   Sq Epi: x / Non Sq Epi: x / Bacteria: x          ASSESSMENT AND PLAN:  POD 3 left hip hemiarthroplasty   1.  Stable. Plan discussed with son and  at bedside. Continue to work with PT, stay on top of pain control, posterior hip precautions.  Appreciate co-management medicine recs.   2. Analgesic pain control  3. DVT prophylaxis: SCDs, ASA 81 BID   4. Weight Bearing Status:  WBAT with posterior hip precautions   5. Disposition: Pt accepted to [facility], pending auth

## 2023-09-06 NOTE — DIETITIAN INITIAL EVALUATION ADULT - EDUCATION DIETARY MODIFICATIONS
Pt drowsy at time of assessment, education deferred. Pt's son aware RD remains available for additional questions/concerns./not applicable

## 2023-09-06 NOTE — PROGRESS NOTE ADULT - REASON FOR ADMISSION
L femoral neck fracture

## 2023-09-06 NOTE — DISCHARGE NOTE NURSING/CASE MANAGEMENT/SOCIAL WORK - PATIENT PORTAL LINK FT
You can access the FollowMyHealth Patient Portal offered by [facility] by registering at the following website: [email or web address]. By joining [facility]’s FollowMyHealth portal, you will also be able to view your health information using other applications (apps) compatible with our system.

## 2023-09-06 NOTE — PROGRESS NOTE ADULT - SUBJECTIVE AND OBJECTIVE BOX
INTERVAL HPI/OVERNIGHT EVENTS: nirav o/n    SUBJECTIVE: Patient seen and examined at bedside.   Pt's  at bedside  Pt feeling pain is improved while walking today - only 2-3/10. Eating better. No N/V/Abd pain. +Flatus wo BM. Voiding. No fever, chest pain, dyspnea.     OBJECTIVE:    VITAL SIGNS:  ICU Vital Signs Last 24 Hrs  T(C): 36.7 (06 Sep 2023 08:19), Max: 37.3 (05 Sep 2023 20:59)  T(F): 98 (06 Sep 2023 08:19), Max: 99.2 (05 Sep 2023 20:59)  HR: 82 (06 Sep 2023 08:19) (82 - 96)  BP: 143/81 (06 Sep 2023 08:19) (137/74 - 153/83)  BP(mean): --  ABP: --  ABP(mean): --  RR: 16 (06 Sep 2023 08:19) (16 - 16)  SpO2: 93% (06 Sep 2023 08:19) (91% - 93%)    O2 Parameters below as of 06 Sep 2023 08:19  Patient On (Oxygen Delivery Method): room air              09-05 @ 07:01  -  09-06 @ 07:00  --------------------------------------------------------  IN: 590 mL / OUT: 2200 mL / NET: -1610 mL      CAPILLARY BLOOD GLUCOSE          PHYSICAL EXAM:  GEN: female in NAD on RA  HEENT: NC/AT, MMM  CV: RRR, nml S1S2, no murmurs  PULM: nml effort, CTAB  ABD: Soft, non-distended, NABS, non-tender  NEURO  A/O x3,   L thigh dressing c/d/i. 5/5 in b/l plantarflex/ext. 4/5 - in L hip flexion - limited d/t pain.   PSYCH: Appropriate      MEDICATIONS:  MEDICATIONS  (STANDING):  acetaminophen     Tablet .. 1000 milliGRAM(s) Oral every 8 hours  aspirin enteric coated 81 milliGRAM(s) Oral two times a day  ergocalciferol 69392 Unit(s) Oral once  levothyroxine 25 MICROGram(s) Oral daily  pantoprazole    Tablet 40 milliGRAM(s) Oral before breakfast  polyethylene glycol 3350 17 Gram(s) Oral at bedtime  senna 2 Tablet(s) Oral at bedtime  sertraline 25 milliGRAM(s) Oral daily    MEDICATIONS  (PRN):  magnesium hydroxide Suspension 30 milliLiter(s) Oral daily PRN Constipation  melatonin 3 milliGRAM(s) Oral at bedtime PRN Sleep  ondansetron Injectable 4 milliGRAM(s) IV Push every 6 hours PRN Nausea and/or Vomiting  oxyCODONE    IR 5 milliGRAM(s) Oral every 4 hours PRN Moderate Pain (4 - 6)  oxyCODONE    IR 2.5 milliGRAM(s) Oral every 4 hours PRN Mild Pain (1 - 3)      ALLERGIES:  Allergies    No Known Allergies    Intolerances        LABS:                        9.1    7.73  )-----------( 224      ( 06 Sep 2023 07:08 )             27.8     09-06    133<L>  |  103  |  12  ----------------------------<  125<H>  4.2   |  27  |  0.62    Ca    9.1      06 Sep 2023 07:08        Urinalysis Basic - ( 06 Sep 2023 07:08 )    Color: x / Appearance: x / SG: x / pH: x  Gluc: 125 mg/dL / Ketone: x  / Bili: x / Urobili: x   Blood: x / Protein: x / Nitrite: x   Leuk Esterase: x / RBC: x / WBC x   Sq Epi: x / Non Sq Epi: x / Bacteria: x        RADIOLOGY & ADDITIONAL TESTS: Reviewed.

## 2023-09-06 NOTE — DIETITIAN INITIAL EVALUATION ADULT - PERTINENT MEDS FT
MEDICATIONS  (STANDING):  acetaminophen     Tablet .. 1000 milliGRAM(s) Oral every 8 hours  aspirin enteric coated 81 milliGRAM(s) Oral two times a day  levothyroxine 25 MICROGram(s) Oral daily  pantoprazole    Tablet 40 milliGRAM(s) Oral before breakfast  polyethylene glycol 3350 17 Gram(s) Oral at bedtime  senna 2 Tablet(s) Oral at bedtime  sertraline 25 milliGRAM(s) Oral daily    MEDICATIONS  (PRN):  magnesium hydroxide Suspension 30 milliLiter(s) Oral daily PRN Constipation  melatonin 3 milliGRAM(s) Oral at bedtime PRN Sleep  ondansetron Injectable 4 milliGRAM(s) IV Push every 6 hours PRN Nausea and/or Vomiting  oxyCODONE    IR 5 milliGRAM(s) Oral every 4 hours PRN Moderate Pain (4 - 6)  oxyCODONE    IR 2.5 milliGRAM(s) Oral every 4 hours PRN Mild Pain (1 - 3)

## 2023-09-10 VITALS
SYSTOLIC BLOOD PRESSURE: 140 MMHG | HEIGHT: 61 IN | HEART RATE: 82 BPM | OXYGEN SATURATION: 96 % | DIASTOLIC BLOOD PRESSURE: 77 MMHG | TEMPERATURE: 98 F | RESPIRATION RATE: 16 BRPM | WEIGHT: 149.91 LBS

## 2023-09-10 PROCEDURE — 99285 EMERGENCY DEPT VISIT HI MDM: CPT

## 2023-09-10 NOTE — ED ADULT TRIAGE NOTE - MODE OF ARRIVAL
Pt arrives to ED via EMS after a possible seizure. Pt states \"I have a headache and my body aches\". When asked about what happened to bring him here Pt states \"I have no idea my family woke me up and then I came here\"    Pt speaks German as primary language. EMS Ambulance

## 2023-09-10 NOTE — ED ADULT NURSE NOTE - CHIEF COMPLAINT QUOTE
Pt arrives from [facility] for L hip wound check. Per staff possible dehiscence of wound, sx was done on 9/6.

## 2023-09-10 NOTE — ED ADULT NURSE NOTE - NSFALLHARMRISKINTERV_ED_ALL_ED

## 2023-09-10 NOTE — ED ADULT TRIAGE NOTE - CHIEF COMPLAINT QUOTE
Pt arrives from [facility] for L hip wound check. Per staff possible dehiscence of wound, sx was done on 9/6.

## 2023-09-10 NOTE — ED ADULT NURSE NOTE - SUICIDE SCREENING DEPRESSION
Patient's daughter called. Patient was at the [facility] on 07-15-23 and needs a follow up appointment. There is nothing for a few weeks. Please call daughter back for an appointment.  
Negative

## 2023-09-10 NOTE — ED ADULT NURSE NOTE - OBJECTIVE STATEMENT
83 y.o. female BIBA from [facility] for wound check. Patient's daughter reports she had one episode of "projectile vomiting," earlier today. Patient's daughter reports her mother had surgery on 9/3/23 to the left hip s/p fall on 9/1/23. Patient was sent in by staff from [facility] for concern of wound dehiscence. On assessment there is a dressing covering surgical site, there is drainage. Patient c/o pain to site by pointing to the left hip. Patient has a abduction pillow in place for stability. Patient is alert and oriented to caregiver, unable to recall time and location. Patient is breathing spontaneously, chest rise is visible, symmetrical, even and unlabored.

## 2023-09-10 NOTE — ED ADULT NURSE NOTE - ED STAT RN HANDOFF DETAILS
Report given to BRITTNEY Reyes. Patient and family aware she is being admitted for hyponatremia. Patient denies any c/o pain or discomfort at this time, VSS, NAD, breathing spontaneously, chest rise is visible, symmetrical, even and unlabored.

## 2023-09-11 ENCOUNTER — INPATIENT (INPATIENT)
Facility: HOSPITAL | Age: 83
LOS: 1 days | Discharge: NURSING, REHAB OR LONG-TERM CARE | DRG: 641 | End: 2023-09-13
Attending: STUDENT IN AN ORGANIZED HEALTH CARE EDUCATION/TRAINING PROGRAM | Admitting: STUDENT IN AN ORGANIZED HEALTH CARE EDUCATION/TRAINING PROGRAM
Payer: MEDICARE

## 2023-09-11 DIAGNOSIS — E03.9 HYPOTHYROIDISM, UNSPECIFIED: ICD-10-CM

## 2023-09-11 DIAGNOSIS — R63.8 OTHER SYMPTOMS AND SIGNS CONCERNING FOOD AND FLUID INTAKE: ICD-10-CM

## 2023-09-11 DIAGNOSIS — T81.49XA INFECTION FOLLOWING A PROCEDURE, OTHER SURGICAL SITE, INITIAL ENCOUNTER: ICD-10-CM

## 2023-09-11 DIAGNOSIS — E87.1 HYPO-OSMOLALITY AND HYPONATREMIA: ICD-10-CM

## 2023-09-11 LAB
A1C WITH ESTIMATED AVERAGE GLUCOSE RESULT: 6.1 % — HIGH (ref 4–5.6)
ALBUMIN SERPL ELPH-MCNC: 2.9 G/DL — LOW (ref 3.3–5)
ALP SERPL-CCNC: 109 U/L — SIGNIFICANT CHANGE UP (ref 40–120)
ALT FLD-CCNC: 22 U/L — SIGNIFICANT CHANGE UP (ref 10–45)
ANION GAP SERPL CALC-SCNC: 1 MMOL/L — LOW (ref 5–17)
ANION GAP SERPL CALC-SCNC: 10 MMOL/L — SIGNIFICANT CHANGE UP (ref 5–17)
ANION GAP SERPL CALC-SCNC: 6 MMOL/L — SIGNIFICANT CHANGE UP (ref 5–17)
ANION GAP SERPL CALC-SCNC: 6 MMOL/L — SIGNIFICANT CHANGE UP (ref 5–17)
ANION GAP SERPL CALC-SCNC: 7 MMOL/L — SIGNIFICANT CHANGE UP (ref 5–17)
ANION GAP SERPL CALC-SCNC: 9 MMOL/L — SIGNIFICANT CHANGE UP (ref 5–17)
APPEARANCE UR: CLEAR — SIGNIFICANT CHANGE UP
AST SERPL-CCNC: 29 U/L — SIGNIFICANT CHANGE UP (ref 10–40)
BACTERIA # UR AUTO: PRESENT /HPF
BASOPHILS # BLD AUTO: 0.04 K/UL — SIGNIFICANT CHANGE UP (ref 0–0.2)
BASOPHILS NFR BLD AUTO: 0.3 % — SIGNIFICANT CHANGE UP (ref 0–2)
BILIRUB SERPL-MCNC: 0.5 MG/DL — SIGNIFICANT CHANGE UP (ref 0.2–1.2)
BILIRUB UR-MCNC: NEGATIVE — SIGNIFICANT CHANGE UP
BUN SERPL-MCNC: 11 MG/DL — SIGNIFICANT CHANGE UP (ref 7–23)
BUN SERPL-MCNC: 7 MG/DL — SIGNIFICANT CHANGE UP (ref 7–23)
BUN SERPL-MCNC: 8 MG/DL — SIGNIFICANT CHANGE UP (ref 7–23)
BUN SERPL-MCNC: 9 MG/DL — SIGNIFICANT CHANGE UP (ref 7–23)
CALCIUM SERPL-MCNC: 8.4 MG/DL — SIGNIFICANT CHANGE UP (ref 8.4–10.5)
CALCIUM SERPL-MCNC: 8.5 MG/DL — SIGNIFICANT CHANGE UP (ref 8.4–10.5)
CALCIUM SERPL-MCNC: 8.7 MG/DL — SIGNIFICANT CHANGE UP (ref 8.4–10.5)
CALCIUM SERPL-MCNC: 9.3 MG/DL — SIGNIFICANT CHANGE UP (ref 8.4–10.5)
CALCIUM SERPL-MCNC: 9.4 MG/DL — SIGNIFICANT CHANGE UP (ref 8.4–10.5)
CALCIUM SERPL-MCNC: 9.5 MG/DL — SIGNIFICANT CHANGE UP (ref 8.4–10.5)
CHLORIDE SERPL-SCNC: 100 MMOL/L — SIGNIFICANT CHANGE UP (ref 96–108)
CHLORIDE SERPL-SCNC: 91 MMOL/L — LOW (ref 96–108)
CHLORIDE SERPL-SCNC: 92 MMOL/L — LOW (ref 96–108)
CHLORIDE SERPL-SCNC: 94 MMOL/L — LOW (ref 96–108)
CHLORIDE SERPL-SCNC: 96 MMOL/L — SIGNIFICANT CHANGE UP (ref 96–108)
CHLORIDE SERPL-SCNC: 97 MMOL/L — SIGNIFICANT CHANGE UP (ref 96–108)
CO2 SERPL-SCNC: 21 MMOL/L — LOW (ref 22–31)
CO2 SERPL-SCNC: 23 MMOL/L — SIGNIFICANT CHANGE UP (ref 22–31)
CO2 SERPL-SCNC: 23 MMOL/L — SIGNIFICANT CHANGE UP (ref 22–31)
CO2 SERPL-SCNC: 24 MMOL/L — SIGNIFICANT CHANGE UP (ref 22–31)
CO2 SERPL-SCNC: 25 MMOL/L — SIGNIFICANT CHANGE UP (ref 22–31)
CO2 SERPL-SCNC: 25 MMOL/L — SIGNIFICANT CHANGE UP (ref 22–31)
COLOR SPEC: YELLOW — SIGNIFICANT CHANGE UP
CREAT ?TM UR-MCNC: 9 MG/DL — SIGNIFICANT CHANGE UP
CREAT SERPL-MCNC: 0.43 MG/DL — LOW (ref 0.5–1.3)
CREAT SERPL-MCNC: 0.47 MG/DL — LOW (ref 0.5–1.3)
CREAT SERPL-MCNC: 0.52 MG/DL — SIGNIFICANT CHANGE UP (ref 0.5–1.3)
CREAT SERPL-MCNC: 0.6 MG/DL — SIGNIFICANT CHANGE UP (ref 0.5–1.3)
CREAT SERPL-MCNC: 0.63 MG/DL — SIGNIFICANT CHANGE UP (ref 0.5–1.3)
CREAT SERPL-MCNC: 0.63 MG/DL — SIGNIFICANT CHANGE UP (ref 0.5–1.3)
CRP SERPL-MCNC: 42.5 MG/L — HIGH (ref 0–4)
DIFF PNL FLD: ABNORMAL
EGFR: 89 ML/MIN/1.73M2 — SIGNIFICANT CHANGE UP
EGFR: 89 ML/MIN/1.73M2 — SIGNIFICANT CHANGE UP
EGFR: 90 ML/MIN/1.73M2 — SIGNIFICANT CHANGE UP
EGFR: 93 ML/MIN/1.73M2 — SIGNIFICANT CHANGE UP
EGFR: 95 ML/MIN/1.73M2 — SIGNIFICANT CHANGE UP
EGFR: 97 ML/MIN/1.73M2 — SIGNIFICANT CHANGE UP
EOSINOPHIL # BLD AUTO: 0.03 K/UL — SIGNIFICANT CHANGE UP (ref 0–0.5)
EOSINOPHIL NFR BLD AUTO: 0.2 % — SIGNIFICANT CHANGE UP (ref 0–6)
EPI CELLS # UR: SIGNIFICANT CHANGE UP /HPF (ref 0–5)
ERYTHROCYTE [SEDIMENTATION RATE] IN BLOOD: 63 MM/HR — HIGH
ESTIMATED AVERAGE GLUCOSE: 128 MG/DL — HIGH (ref 68–114)
GLUCOSE SERPL-MCNC: 127 MG/DL — HIGH (ref 70–99)
GLUCOSE SERPL-MCNC: 163 MG/DL — HIGH (ref 70–99)
GLUCOSE SERPL-MCNC: 165 MG/DL — HIGH (ref 70–99)
GLUCOSE SERPL-MCNC: 178 MG/DL — HIGH (ref 70–99)
GLUCOSE SERPL-MCNC: 206 MG/DL — HIGH (ref 70–99)
GLUCOSE SERPL-MCNC: 421 MG/DL — HIGH (ref 70–99)
GLUCOSE UR QL: NEGATIVE — SIGNIFICANT CHANGE UP
GRAM STN FLD: SIGNIFICANT CHANGE UP
HCT VFR BLD CALC: 28.8 % — LOW (ref 34.5–45)
HGB BLD-MCNC: 9.9 G/DL — LOW (ref 11.5–15.5)
IMM GRANULOCYTES NFR BLD AUTO: 1.4 % — HIGH (ref 0–0.9)
KETONES UR-MCNC: NEGATIVE — SIGNIFICANT CHANGE UP
LACTATE SERPL-SCNC: 1.4 MMOL/L — SIGNIFICANT CHANGE UP (ref 0.5–2)
LEUKOCYTE ESTERASE UR-ACNC: NEGATIVE — SIGNIFICANT CHANGE UP
LYMPHOCYTES # BLD AUTO: 1.69 K/UL — SIGNIFICANT CHANGE UP (ref 1–3.3)
LYMPHOCYTES # BLD AUTO: 12 % — LOW (ref 13–44)
MAGNESIUM SERPL-MCNC: 1.9 MG/DL — SIGNIFICANT CHANGE UP (ref 1.6–2.6)
MCHC RBC-ENTMCNC: 28.9 PG — SIGNIFICANT CHANGE UP (ref 27–34)
MCHC RBC-ENTMCNC: 34.4 GM/DL — SIGNIFICANT CHANGE UP (ref 32–36)
MCV RBC AUTO: 84 FL — SIGNIFICANT CHANGE UP (ref 80–100)
MONOCYTES # BLD AUTO: 0.9 K/UL — SIGNIFICANT CHANGE UP (ref 0–0.9)
MONOCYTES NFR BLD AUTO: 6.4 % — SIGNIFICANT CHANGE UP (ref 2–14)
NEUTROPHILS # BLD AUTO: 11.27 K/UL — HIGH (ref 1.8–7.4)
NEUTROPHILS NFR BLD AUTO: 79.7 % — HIGH (ref 43–77)
NITRITE UR-MCNC: NEGATIVE — SIGNIFICANT CHANGE UP
NRBC # BLD: 0 /100 WBCS — SIGNIFICANT CHANGE UP (ref 0–0)
OSMOLALITY SERPL: 278 MOSM/KG — LOW (ref 280–301)
OSMOLALITY UR: 119 MOSM/KG — LOW (ref 300–900)
OSMOLALITY UR: 79 MOSM/KG — LOW (ref 300–900)
PH UR: 6.5 — SIGNIFICANT CHANGE UP (ref 5–8)
PHOSPHATE SERPL-MCNC: 2.6 MG/DL — SIGNIFICANT CHANGE UP (ref 2.5–4.5)
PLATELET # BLD AUTO: 436 K/UL — HIGH (ref 150–400)
POTASSIUM SERPL-MCNC: 3.4 MMOL/L — LOW (ref 3.5–5.3)
POTASSIUM SERPL-MCNC: 3.4 MMOL/L — LOW (ref 3.5–5.3)
POTASSIUM SERPL-MCNC: 3.6 MMOL/L — SIGNIFICANT CHANGE UP (ref 3.5–5.3)
POTASSIUM SERPL-MCNC: 3.6 MMOL/L — SIGNIFICANT CHANGE UP (ref 3.5–5.3)
POTASSIUM SERPL-MCNC: 3.7 MMOL/L — SIGNIFICANT CHANGE UP (ref 3.5–5.3)
POTASSIUM SERPL-MCNC: 4 MMOL/L — SIGNIFICANT CHANGE UP (ref 3.5–5.3)
POTASSIUM SERPL-SCNC: 3.4 MMOL/L — LOW (ref 3.5–5.3)
POTASSIUM SERPL-SCNC: 3.4 MMOL/L — LOW (ref 3.5–5.3)
POTASSIUM SERPL-SCNC: 3.6 MMOL/L — SIGNIFICANT CHANGE UP (ref 3.5–5.3)
POTASSIUM SERPL-SCNC: 3.6 MMOL/L — SIGNIFICANT CHANGE UP (ref 3.5–5.3)
POTASSIUM SERPL-SCNC: 3.7 MMOL/L — SIGNIFICANT CHANGE UP (ref 3.5–5.3)
POTASSIUM SERPL-SCNC: 4 MMOL/L — SIGNIFICANT CHANGE UP (ref 3.5–5.3)
POTASSIUM UR-SCNC: 8 MMOL/L — SIGNIFICANT CHANGE UP
PROT ?TM UR-MCNC: <4 MG/DL — SIGNIFICANT CHANGE UP (ref 0–12)
PROT SERPL-MCNC: 6.4 G/DL — SIGNIFICANT CHANGE UP (ref 6–8.3)
PROT UR-MCNC: NEGATIVE MG/DL — SIGNIFICANT CHANGE UP
PROT/CREAT UR-RTO: SIGNIFICANT CHANGE UP (ref 0–0.2)
RBC # BLD: 3.43 M/UL — LOW (ref 3.8–5.2)
RBC # FLD: 12.8 % — SIGNIFICANT CHANGE UP (ref 10.3–14.5)
RBC CASTS # UR COMP ASSIST: ABNORMAL /HPF
SODIUM SERPL-SCNC: 120 MMOL/L — CRITICAL LOW (ref 135–145)
SODIUM SERPL-SCNC: 122 MMOL/L — LOW (ref 135–145)
SODIUM SERPL-SCNC: 123 MMOL/L — LOW (ref 135–145)
SODIUM SERPL-SCNC: 124 MMOL/L — LOW (ref 135–145)
SODIUM SERPL-SCNC: 130 MMOL/L — LOW (ref 135–145)
SODIUM SERPL-SCNC: 131 MMOL/L — LOW (ref 135–145)
SODIUM UR-SCNC: 20 MMOL/L — SIGNIFICANT CHANGE UP
SODIUM UR-SCNC: 20 MMOL/L — SIGNIFICANT CHANGE UP
SP GR SPEC: <=1.005 — SIGNIFICANT CHANGE UP (ref 1–1.03)
SPECIMEN SOURCE: SIGNIFICANT CHANGE UP
T4 AB SER-ACNC: 8.43 UG/DL — SIGNIFICANT CHANGE UP (ref 4.5–11.7)
TSH SERPL-MCNC: 14.27 UIU/ML — HIGH (ref 0.27–4.2)
URATE SERPL-MCNC: 2.9 MG/DL — SIGNIFICANT CHANGE UP (ref 2.5–7)
UROBILINOGEN FLD QL: 0.2 E.U./DL — SIGNIFICANT CHANGE UP
UUN UR-MCNC: 100 MG/DL — SIGNIFICANT CHANGE UP
WBC # BLD: 14.13 K/UL — HIGH (ref 3.8–10.5)
WBC # FLD AUTO: 14.13 K/UL — HIGH (ref 3.8–10.5)
WBC UR QL: < 5 /HPF — SIGNIFICANT CHANGE UP

## 2023-09-11 PROCEDURE — 73552 X-RAY EXAM OF FEMUR 2/>: CPT | Mod: 26,LT

## 2023-09-11 PROCEDURE — 99223 1ST HOSP IP/OBS HIGH 75: CPT | Mod: GC

## 2023-09-11 PROCEDURE — 99222 1ST HOSP IP/OBS MODERATE 55: CPT | Mod: GC

## 2023-09-11 PROCEDURE — 99223 1ST HOSP IP/OBS HIGH 75: CPT

## 2023-09-11 PROCEDURE — 73502 X-RAY EXAM HIP UNI 2-3 VIEWS: CPT | Mod: 26,LT

## 2023-09-11 RX ORDER — VANCOMYCIN HCL 1 G
1000 VIAL (EA) INTRAVENOUS ONCE
Refills: 0 | Status: COMPLETED | OUTPATIENT
Start: 2023-09-11 | End: 2023-09-11

## 2023-09-11 RX ORDER — VANCOMYCIN HCL 1 G
1000 VIAL (EA) INTRAVENOUS EVERY 12 HOURS
Refills: 0 | Status: DISCONTINUED | OUTPATIENT
Start: 2023-09-11 | End: 2023-09-11

## 2023-09-11 RX ORDER — LANOLIN ALCOHOL/MO/W.PET/CERES
5 CREAM (GRAM) TOPICAL ONCE
Refills: 0 | Status: COMPLETED | OUTPATIENT
Start: 2023-09-11 | End: 2023-09-11

## 2023-09-11 RX ORDER — DESMOPRESSIN ACETATE 0.1 MG/1
0.1 TABLET ORAL ONCE
Refills: 0 | Status: DISCONTINUED | OUTPATIENT
Start: 2023-09-11 | End: 2023-09-11

## 2023-09-11 RX ORDER — SODIUM CHLORIDE 9 MG/ML
1500 INJECTION, SOLUTION INTRAVENOUS
Refills: 0 | Status: DISCONTINUED | OUTPATIENT
Start: 2023-09-11 | End: 2023-09-12

## 2023-09-11 RX ORDER — SODIUM CHLORIDE 9 MG/ML
250 INJECTION, SOLUTION INTRAVENOUS
Refills: 0 | Status: DISCONTINUED | OUTPATIENT
Start: 2023-09-11 | End: 2023-09-12

## 2023-09-11 RX ORDER — SODIUM CHLORIDE 9 MG/ML
500 INJECTION INTRAMUSCULAR; INTRAVENOUS; SUBCUTANEOUS ONCE
Refills: 0 | Status: COMPLETED | OUTPATIENT
Start: 2023-09-11 | End: 2023-09-11

## 2023-09-11 RX ORDER — LANOLIN ALCOHOL/MO/W.PET/CERES
3 CREAM (GRAM) TOPICAL AT BEDTIME
Refills: 0 | Status: DISCONTINUED | OUTPATIENT
Start: 2023-09-11 | End: 2023-09-13

## 2023-09-11 RX ORDER — ENOXAPARIN SODIUM 100 MG/ML
40 INJECTION SUBCUTANEOUS EVERY 24 HOURS
Refills: 0 | Status: DISCONTINUED | OUTPATIENT
Start: 2023-09-11 | End: 2023-09-13

## 2023-09-11 RX ORDER — ACETAMINOPHEN 500 MG
650 TABLET ORAL EVERY 6 HOURS
Refills: 0 | Status: DISCONTINUED | OUTPATIENT
Start: 2023-09-11 | End: 2023-09-13

## 2023-09-11 RX ORDER — DESMOPRESSIN ACETATE 0.1 MG/1
1 TABLET ORAL ONCE
Refills: 0 | Status: COMPLETED | OUTPATIENT
Start: 2023-09-11 | End: 2023-09-11

## 2023-09-11 RX ORDER — PIPERACILLIN AND TAZOBACTAM 4; .5 G/20ML; G/20ML
3.38 INJECTION, POWDER, LYOPHILIZED, FOR SOLUTION INTRAVENOUS ONCE
Refills: 0 | Status: COMPLETED | OUTPATIENT
Start: 2023-09-11 | End: 2023-09-11

## 2023-09-11 RX ORDER — PIPERACILLIN AND TAZOBACTAM 4; .5 G/20ML; G/20ML
4.5 INJECTION, POWDER, LYOPHILIZED, FOR SOLUTION INTRAVENOUS EVERY 8 HOURS
Refills: 0 | Status: DISCONTINUED | OUTPATIENT
Start: 2023-09-11 | End: 2023-09-11

## 2023-09-11 RX ORDER — LEVOTHYROXINE SODIUM 125 MCG
25 TABLET ORAL DAILY
Refills: 0 | Status: DISCONTINUED | OUTPATIENT
Start: 2023-09-11 | End: 2023-09-13

## 2023-09-11 RX ORDER — SODIUM CHLORIDE 9 MG/ML
500 INJECTION, SOLUTION INTRAVENOUS
Refills: 0 | Status: DISCONTINUED | OUTPATIENT
Start: 2023-09-11 | End: 2023-09-11

## 2023-09-11 RX ADMIN — PIPERACILLIN AND TAZOBACTAM 25 GRAM(S): 4; .5 INJECTION, POWDER, LYOPHILIZED, FOR SOLUTION INTRAVENOUS at 05:50

## 2023-09-11 RX ADMIN — DESMOPRESSIN ACETATE 1 MICROGRAM(S): 0.1 TABLET ORAL at 14:49

## 2023-09-11 RX ADMIN — SODIUM CHLORIDE 500 MILLILITER(S): 9 INJECTION INTRAMUSCULAR; INTRAVENOUS; SUBCUTANEOUS at 05:51

## 2023-09-11 RX ADMIN — DESMOPRESSIN ACETATE 1 MICROGRAM(S): 0.1 TABLET ORAL at 19:56

## 2023-09-11 RX ADMIN — SODIUM CHLORIDE 250 MILLILITER(S): 9 INJECTION, SOLUTION INTRAVENOUS at 20:13

## 2023-09-11 RX ADMIN — PIPERACILLIN AND TAZOBACTAM 200 GRAM(S): 4; .5 INJECTION, POWDER, LYOPHILIZED, FOR SOLUTION INTRAVENOUS at 01:18

## 2023-09-11 RX ADMIN — SODIUM CHLORIDE 250 MILLILITER(S): 9 INJECTION, SOLUTION INTRAVENOUS at 09:23

## 2023-09-11 RX ADMIN — Medication 5 MILLIGRAM(S): at 01:40

## 2023-09-11 RX ADMIN — SODIUM CHLORIDE 1500 MILLILITER(S): 9 INJECTION, SOLUTION INTRAVENOUS at 14:48

## 2023-09-11 RX ADMIN — Medication 25 MICROGRAM(S): at 06:35

## 2023-09-11 RX ADMIN — Medication 250 MILLIGRAM(S): at 01:19

## 2023-09-11 RX ADMIN — ENOXAPARIN SODIUM 40 MILLIGRAM(S): 100 INJECTION SUBCUTANEOUS at 09:23

## 2023-09-11 RX ADMIN — Medication 3 MILLIGRAM(S): at 22:59

## 2023-09-11 NOTE — PHYSICAL THERAPY INITIAL EVALUATION ADULT - PERTINENT HX OF CURRENT PROBLEM, REHAB EVAL
The patient is a 82yo female with PMHx of hypothyroidism and anxiety who presents to the ED 8 days after left posterior hemiarthroplasty with concern of postop infection. Per family at bedside, pt has been complaining of pain and they noticed drainage to the incision site the last few days. No fevers or chills/malaise. Patient was d/c to [facility] rehab on last admission.

## 2023-09-11 NOTE — H&P ADULT - HISTORY OF PRESENT ILLNESS
HPI:  The patient is a 82yo female with PMHx of hypothyroidism and anxiety who presents to the ED 8 days after right hemiarthroplasty with concern of postop infection. Per family at bedside, pt has been complaining of pain and they noticed drainage to the incision site the last few days. No fevers or chills/malaise. Patient was d/c to [facility] rehab on last admission.     In ED   VSS, afebrile  Labs note able for leukocytosis 14, ESR 62, CRP 42, Na 118-->123. lactate wnl  Patient received vanc, zosyn, melatonin in ED.  Ortho consulted who rec *** HPI:  The patient is a 82yo female with PMHx of hypothyroidism and anxiety who presents to the ED 8 days after right hemiarthroplasty with concern of postop infection. Per family at bedside, pt has been complaining of pain and they noticed drainage to the incision site the last few days. No fevers or chills/malaise. Patient was d/c to [facility] rehab on last admission.     In ED   VSS, afebrile  Labs note able for leukocytosis 14, ESR 62, CRP 42, Na 118-->123. lactate wnl  Patient received vanc, zosyn, melatonin in ED.  Ortho consulted who rec ***  EKG pending HPI:  The patient is a 84yo female with PMHx of hypothyroidism and anxiety who presents to the ED 8 days after left hemiarthroplasty with concern of postop infection. Per family at bedside, pt has been complaining of pain and they noticed drainage to the incision site the last few days. No fevers or chills/malaise. Patient was d/c to [facility] rehab on last admission.     In ED   VSS, afebrile  Labs note able for leukocytosis 14, ESR 62, CRP 42, Na 118-->123. lactate wnl  Patient received vanc, zosyn, melatonin in ED.  Ortho consulted who rec ***  EKG pending HPI:  The patient is a 84yo female with PMHx of hypothyroidism and anxiety who presents to the ED 8 days after left hemiarthroplasty with concern of postop infection. Per family at bedside, pt has been complaining of pain and they noticed drainage to the incision site the last few days. No fevers or chills/malaise. Patient was d/c to [facility] rehab on last admission.     In ED   VSS, afebrile  Labs note able for leukocytosis 14, ESR 62, CRP 42, Na 118-->123. lactate wnl  Patient received vanc, zosyn, melatonin in ED.  Ortho consulted and will follow

## 2023-09-11 NOTE — PROGRESS NOTE ADULT - ASSESSMENT
The patient is a 84yo female with PMHx of hypothyroidism and anxiety who presents to the ED 8 days after right hemiarthroplasty with concern of postop infection The patient is a 82yo female with PMHx of hypothyroidism and anxiety who presents to the ED 8 days after left hemiarthroplasty with concern of postop infection. The patient is a 84yo female with PMHx of hypothyroidism, osteoporosis and anxiety who presents to the ED 8 days after left hemiarthroplasty with concern of postop infection. The pt was also found to be hyponatremic from baseline. The pt was admitted for management of possible infection and hyponatremia

## 2023-09-11 NOTE — PROGRESS NOTE ADULT - PROBLEM SELECTOR PLAN 4
F: None   E: Replete as necessary K>4 Mg>2  N: DASH diet   DVT Prophylaxis: Lovenox 40mg daily   GI prophylaxis: None   CODE STATUS: FULL F: 500 D5W  E: Replete as necessary K>4 Mg>2  N: DASH diet   DVT Prophylaxis: Lovenox 40mg daily   GI prophylaxis: None   CODE STATUS: FULL

## 2023-09-11 NOTE — PHYSICAL THERAPY INITIAL EVALUATION ADULT - GENERAL OBSERVATIONS, REHAB EVAL
As per BRITTNEY Mcgowan patient cleared for PT/OOB. received supine + heplock, abduction pillow, dressing left lateral hip with some serous sanguenous drainage, BRITTNEY Mcgowan aware.

## 2023-09-11 NOTE — PATIENT PROFILE ADULT - PATIENT'S PREFERRED PRONOUN
Hemigard Postcare Instructions: The HEMIGARD strips are to remain completely dry for at least 5-7 days. Her/She

## 2023-09-11 NOTE — H&P ADULT - ATTENDING COMMENTS
The patient is a 82yo female with PMHx of hypothyroidism and anxiety who presents to the ED 8 days after right hemiarthroplasty with skin changes around surgical site. Agree with above plan, consulted Ortho. Nephrology consult for overcorrected hyponatremia. Continue to monitor. No signs of infection. Patient remains hemodynamically stable. Plan to d/c abx and observe. Agree, likely Skin irritation from Aquacel dressing as per Ortho.

## 2023-09-11 NOTE — PROGRESS NOTE ADULT - PROBLEM SELECTOR PLAN 1
Patient 8 days post op coming in with +WBC, +ESR, +CRP, and concern for surgical site infection given pain to left hip and drainage that the family noticed. Orthopedics consulted in ED who did surgery and stated *** no sx of cough, diarrhea, n/v, URI sx. S/p vanc / zosyn 3.375 in ED  -C/w antibiotic coverage vanc and zosyn  -f/u bcx   -f/u ortho recs Patient 8 days post op coming in with +WBC, +ESR, +CRP, and concern for surgical site infection given pain to left hip and drainage that the family noticed. Orthopedics consulted in ED who did surgery and stated *** no sx of cough, diarrhea, n/v, URI sx. S/p vanc / zosyn 3.375 in ED  -C/w antibiotic coverage vanc and zosyn  -f/u bcx   -f/u ortho recs  - f/u wound culture Patient 8 days post op coming in with +WBC, +ESR, +CRP, and concern for surgical site infection given pain to left hip and drainage that the family noticed. Orthopedics consulted in ED who did surgery and stated *** no sx of cough, diarrhea, n/v, URI sx. S/p vanc / zosyn 3.375 in ED  -vanc/zosyn discontinued as infection unlikely.  -f/u bcx   - f/u wound culture  -f/u ortho recs Patient 8 days post op coming in with +WBC, +ESR, +CRP, and concern for surgical site infection given pain to left hip and drainage that the family noticed. Orthopedics consulted in ED who did surgery and stated *** no sx of cough, diarrhea, n/v, URI sx. S/p vanc / zosyn 3.375 in ED  -vanc/zosyn discontinued as infection unlikely.  -f/u bcx   - f/u wound culture  - f/u CBC  -f/u ortho recs Patient 8 days post op coming in with +WBC, +ESR, +CRP, and initial concern for surgical site infection given pain to left hip and drainage that the family noticed. No drainage noticed during our evaluation. Orthopedics consulted in ED and states no need for acute intervention. No sx of cough, diarrhea, n/v, URI sx.   S/p vanc / zosyn 3.375 in ED  Today, pt not meeting SIRS criteria, site on exam is c/d/i without erythema or signs of infection. Leukocytosis is improving.  Orthopedic surgery following   Plan:  - vanc/zosyn discontinued as infection unlikely.  - Orthopedic surgery recommends daily dressing changes w/ xeroform, 4x4, and paper tape  - f/u blood and wound culture   - trend CBC daily   - Monitor vitals daily or more frequent if needed

## 2023-09-11 NOTE — ED PROVIDER NOTE - CLINICAL SUMMARY MEDICAL DECISION MAKING FREE TEXT BOX
VS ok, pt not in distress, on exam incision site not erythematous but has lot of liquidy brown colored discharge and tenderness. C/f postop infection, plan for labs, xrays, abx, ortho consult VS ok, pt not in distress, on exam incision site not erythematous but has lot of liquidy brown colored discharge and tenderness. C/f postop infection, plan for labs, xrays, abx, ortho consult  -->noted leukocytosis, hyponatremia. will send urine studies, pending ortho recs, to be admitted for hyponatremia.

## 2023-09-11 NOTE — H&P ADULT - PROBLEM SELECTOR PLAN 3
F: None   E: Replete as necessary K>4 Mg>2  N: DASH diet   DVT Prophylaxis: Lovenox 40mg daily   GI prophylaxis: None   CODE STATUS: FULL cw synthroid 25 home

## 2023-09-11 NOTE — H&P ADULT - NSHPPHYSICALEXAM_GEN_ALL_CORE
Vital Signs Last 12 Hrs  T(F): 98.3 (09-11-23 @ 02:53), Max: 98.4 (09-10-23 @ 21:44)  HR: 78 (09-11-23 @ 02:53) (78 - 87)  BP: 154/77 (09-11-23 @ 02:53) (140/77 - 161/90)  BP(mean): --  RR: 16 (09-11-23 @ 02:53) (16 - 16)  SpO2: 96% (09-11-23 @ 02:53) (95% - 96%)    PHYSICAL EXAM:  Constitutional: NAD, comfortable in bed.  HEENT: NC/AT, PERRLA, EOMI, no conjunctival pallor or scleral icterus, MMM  Neck: Supple, no JVD  Respiratory: CTA B/L. No w/r/r.   Cardiovascular: RRR, normal S1 and S2, no m/r/g.   Gastrointestinal: +BS, soft NTND, no guarding or rebound tenderness, no palpable masses   Extremities: L hip incision not erythematous but liquidy brown discharge and tenderness around it, noted abrasion/skin tears as well  Neurological: AAOx3.

## 2023-09-11 NOTE — CONSULT NOTE ADULT - SUBJECTIVE AND OBJECTIVE BOX
HPI:  HPI:  The patient is a 82yo female with PMHx of hypothyroidism and anxiety who presents to the ED 8 days after left hemiarthroplasty with concern of postop infection. Per family at bedside, pt has been complaining of pain and they noticed drainage to the incision site the last few days. No fevers or chills/malaise. Patient was d/c to [facility] rehab on last admission.     In ED   VSS, afebrile  Labs note able for leukocytosis 14, ESR 62, CRP 42, Na 118-->123. lactate wnl  Patient received vanc, zosyn, melatonin in ED.  Ortho consulted and will follow   (11 Sep 2023 02:44)      PAST MEDICAL & SURGICAL HISTORY:  Chronic UTI            Allergies:  No Known Allergies      Home Medications:   acetaminophen 500 mg oral tablet: 2 tab(s) orally every 8 hours  aspirin 81 mg oral delayed release tablet: 1 tab(s) orally 2 times a day  ergocalciferol 1.25 mg (50,000 intl units) oral capsule: 50,000 international unit(s) orally every 7 days RECHECK AFTER 8 WEEKS  levothyroxine 25 mcg (0.025 mg) oral tablet: 1 tab(s) orally once a day  oxyCODONE 5 mg oral tablet: 1 tab(s) orally every 4 hours As needed Moderate Pain (4 - 6)  pantoprazole 40 mg oral delayed release tablet: 1 tab(s) orally once a day (before a meal)  senna leaf extract oral tablet: 2 tab(s) orally once a day (at bedtime)  sertraline 25 mg oral tablet: 1 tab(s) orally once a day      Hospital Medications:   MEDICATIONS  (STANDING):  dextrose 5%. 500 milliLiter(s) (250 mL/Hr) IV Continuous <Continuous>  enoxaparin Injectable 40 milliGRAM(s) SubCutaneous every 24 hours  levothyroxine 25 MICROGram(s) Oral daily      SOCIAL HISTORY:  Denies ETOh, Smoking    Family History:  FAMILY HISTORY:        VITALS:  T(F): 97.5 (09-11-23 @ 12:28), Max: 99.9 (09-11-23 @ 04:00)  HR: 89 (09-11-23 @ 12:28)  BP: 122/88 (09-11-23 @ 12:28)  RR: 18 (09-11-23 @ 12:28)  SpO2: 97% (09-11-23 @ 12:28)  Wt(kg): --    09-10 @ 07:01  -  09-11 @ 07:00  --------------------------------------------------------  IN: 575 mL / OUT: 1800 mL / NET: -1225 mL    09-11 @ 07:01  -  09-11 @ 14:03  --------------------------------------------------------  IN: 25 mL / OUT: 1200 mL / NET: -1175 mL      Height (cm): 154.9 (09-11 @ 04:00)  Weight (kg): 60.1 (09-11 @ 04:00)  BMI (kg/m2): 25 (09-11 @ 04:00)  BSA (m2): 1.59 (09-11 @ 04:00)  CAPILLARY BLOOD GLUCOSE          Review of Systems:  CONSTITUTIONAL: No fever or chills.  RESPIRATORY: No shortness of breath, cough  CARDIOVASCULAR: No Chest pain, shortness of breath, palpitations  GASTROINTESTINAL: No abdominal pain, nausea, vomiting, diarrhea  GENITOURINARY: No urinary frequency, gross hematuria, dysuria  NEUROLOGICAL: No headache, weakness  SKIN: No rash or skin lesion  MUSCULOSKELETAL: No swelling  Psych: Denies suicidal or homicidal ideation    PHYSICAL EXAM:  GENERAL: Alert, awake, oriented x3   HEENT: TABBY, EOMI, neck supple, no JVP  CHEST/LUNG: Bilateral clear breath sounds  HEART: Regular rate and rhythm, no murmur, no gallops, no rub   ABDOMEN: Soft, nontender, non distended  : No flank or supra pubic tenderness.  EXTREMITIES: no pedal edema  Neurology: AAOx3, no focal neurological deficit  SKIN: No rash or skin lesion     LABS:  09-11    131<L>  |  100  |  8   ----------------------------<  178<H>  3.6   |  25  |  0.52    Ca    9.3      11 Sep 2023 10:00  Phos  2.6     09-11  Mg     1.9     09-11    TPro  6.4  /  Alb  2.9<L>  /  TBili  0.5  /  DBili      /  AST  29  /  ALT  22  /  AlkPhos  109  09-11    Creatinine Trend: 0.52 <--, 0.47 <--, 0.43 <--, 0.43 <--, 0.62 <--, 0.62 <--                        9.9    14.13 )-----------( 436      ( 11 Sep 2023 05:47 )             28.8     Urine Studies:  Urinalysis Basic - ( 11 Sep 2023 10:00 )    Color:  / Appearance:  / SG:  / pH:   Gluc: 178 mg/dL / Ketone:   / Bili:  / Urobili:    Blood:  / Protein:  / Nitrite:    Leuk Esterase:  / RBC:  / WBC    Sq Epi:  / Non Sq Epi:  / Bacteria:       Sodium, Random Urine: 20 mmol/L (09-11 @ 04:06)  Creatinine, Random Urine: 9 mg/dL (09-11 @ 04:06)  Protein/Creatinine Ratio Calculation: Unable to calculate (09-11 @ 04:06)  Osmolality, Random Urine: 79 mosm/kg (09-11 @ 04:06)  Potassium, Random Urine: 8 mmol/L (09-11 @ 04:06)  Osmolality, Random Urine: 115 mosm/kg (09-05 @ 10:57)  Sodium, Random Urine: 20 mmol/L (09-05 @ 10:57)         HPI:  The patient is a 82yo female with PMHx of hypothyroidism and anxiety who presents to the ED 8 days after left hemiarthroplasty with concern of postop infection. Per family at bedside, pt has been complaining of pain and they noticed drainage to the incision site the last few days. No fevers or chills/malaise. Patient was d/c to [facility] rehab on last admission. On previous admission also noted to be hyponatremic with sNa running between 133, but in may of 2023 was normanatremic. Patient does not offer any complaints. Denies any pain and states that she was not eating well in the past couple of days and states that she had decreased appetite. sNa on admission found to be 118, given 500cc NS and this AM sodium of 131. Nephrology consulted for overcorrection.           PAST MEDICAL & SURGICAL HISTORY:  Chronic UTI            Allergies:  No Known Allergies      Home Medications:   acetaminophen 500 mg oral tablet: 2 tab(s) orally every 8 hours  aspirin 81 mg oral delayed release tablet: 1 tab(s) orally 2 times a day  ergocalciferol 1.25 mg (50,000 intl units) oral capsule: 50,000 international unit(s) orally every 7 days RECHECK AFTER 8 WEEKS  levothyroxine 25 mcg (0.025 mg) oral tablet: 1 tab(s) orally once a day  oxyCODONE 5 mg oral tablet: 1 tab(s) orally every 4 hours As needed Moderate Pain (4 - 6)  pantoprazole 40 mg oral delayed release tablet: 1 tab(s) orally once a day (before a meal)  senna leaf extract oral tablet: 2 tab(s) orally once a day (at bedtime)  sertraline 25 mg oral tablet: 1 tab(s) orally once a day      Hospital Medications:   MEDICATIONS  (STANDING):  dextrose 5%. 500 milliLiter(s) (250 mL/Hr) IV Continuous <Continuous>  enoxaparin Injectable 40 milliGRAM(s) SubCutaneous every 24 hours  levothyroxine 25 MICROGram(s) Oral daily      SOCIAL HISTORY:  Denies ETOh, Smoking    Family History:  FAMILY HISTORY:        VITALS:  T(F): 97.5 (09-11-23 @ 12:28), Max: 99.9 (09-11-23 @ 04:00)  HR: 89 (09-11-23 @ 12:28)  BP: 122/88 (09-11-23 @ 12:28)  RR: 18 (09-11-23 @ 12:28)  SpO2: 97% (09-11-23 @ 12:28)  Wt(kg): --    09-10 @ 07:01  -  09-11 @ 07:00  --------------------------------------------------------  IN: 575 mL / OUT: 1800 mL / NET: -1225 mL    09-11 @ 07:01  -  09-11 @ 14:03  --------------------------------------------------------  IN: 25 mL / OUT: 1200 mL / NET: -1175 mL      Height (cm): 154.9 (09-11 @ 04:00)  Weight (kg): 60.1 (09-11 @ 04:00)  BMI (kg/m2): 25 (09-11 @ 04:00)  BSA (m2): 1.59 (09-11 @ 04:00)  CAPILLARY BLOOD GLUCOSE          Review of Systems:  CONSTITUTIONAL: No fever or chills.  RESPIRATORY: No shortness of breath, cough  CARDIOVASCULAR: No Chest pain, shortness of breath, palpitations  GASTROINTESTINAL: No abdominal pain, nausea, vomiting, diarrhea  GENITOURINARY: No urinary frequency, gross hematuria, dysuria  NEUROLOGICAL: No headache, weakness  SKIN: No rash or skin lesion  MUSCULOSKELETAL: No swelling      PHYSICAL EXAM:  GENERAL: Alert, awake, oriented x3, having breakfast   HEENT: TABBY, EOMI,   CHEST/LUNG: Bilateral clear breath sounds  HEART: Regular rate and rhythm, no murmur, no gallops, no rub   EXTREMITIES: no pedal edema  Neurology: AAOx3, no focal neurological deficit  SKIN: No rash or skin lesion     LABS:  09-11    131<L>  |  100  |  8   ----------------------------<  178<H>  3.6   |  25  |  0.52    Ca    9.3      11 Sep 2023 10:00  Phos  2.6     09-11  Mg     1.9     09-11    TPro  6.4  /  Alb  2.9<L>  /  TBili  0.5  /  DBili      /  AST  29  /  ALT  22  /  AlkPhos  109  09-11    Creatinine Trend: 0.52 <--, 0.47 <--, 0.43 <--, 0.43 <--, 0.62 <--, 0.62 <--                        9.9    14.13 )-----------( 436      ( 11 Sep 2023 05:47 )             28.8     Urine Studies:  Urinalysis Basic - ( 11 Sep 2023 10:00 )    Color:  / Appearance:  / SG:  / pH:   Gluc: 178 mg/dL / Ketone:   / Bili:  / Urobili:    Blood:  / Protein:  / Nitrite:    Leuk Esterase:  / RBC:  / WBC    Sq Epi:  / Non Sq Epi:  / Bacteria:       Sodium, Random Urine: 20 mmol/L (09-11 @ 04:06)  Creatinine, Random Urine: 9 mg/dL (09-11 @ 04:06)  Protein/Creatinine Ratio Calculation: Unable to calculate (09-11 @ 04:06)  Osmolality, Random Urine: 79 mosm/kg (09-11 @ 04:06)  Potassium, Random Urine: 8 mmol/L (09-11 @ 04:06)  Osmolality, Random Urine: 115 mosm/kg (09-05 @ 10:57)  Sodium, Random Urine: 20 mmol/L (09-05 @ 10:57)

## 2023-09-11 NOTE — PHYSICAL THERAPY INITIAL EVALUATION ADULT - ADDITIONAL COMMENTS
Patient admitted from rehab. States in rehab she was starting to ambulate with RW and assist. Prior to her hip surgery in the beginning of this month was living with her  in an elevator building. Patient used a w/c outdoors but was able to ambulate without an AD indoors.

## 2023-09-11 NOTE — H&P ADULT - ASSESSMENT
The patient is a 84yo female with PMHx of hypothyroidism and anxiety who presents to the ED 8 days after right hemiarthroplasty with concern of postop infection

## 2023-09-11 NOTE — PATIENT PROFILE ADULT - FALL HARM RISK - FALL HARM RISK
weakness, Recent Fall 9/1 and Left Hip Hemiarthroplasty surgery 9/3,/Bone Condition/Coagulation/Surgery/Other

## 2023-09-11 NOTE — PROGRESS NOTE ADULT - SUBJECTIVE AND OBJECTIVE BOX
** INCOMPLETE **    OVERNIGHT EVENTS:     SUBJECTIVE:    VITAL SIGNS:  Vital Signs Last 24 Hrs  T(C): 37.7 (11 Sep 2023 04:00), Max: 37.7 (11 Sep 2023 04:00)  T(F): 99.9 (11 Sep 2023 04:00), Max: 99.9 (11 Sep 2023 04:00)  HR: 84 (11 Sep 2023 04:00) (78 - 87)  BP: 161/78 (11 Sep 2023 04:00) (140/77 - 161/90)  BP(mean): 106 (11 Sep 2023 04:00) (106 - 106)  RR: 16 (11 Sep 2023 04:00) (16 - 16)  SpO2: 96% (11 Sep 2023 04:00) (95% - 96%)    Parameters below as of 11 Sep 2023 04:00  Patient On (Oxygen Delivery Method): room air        PHYSICAL EXAM:  General: NAD; speaking in full sentences  HEENT: NC/AT; PERRL; EOMI; MMM  Neck: supple; no JVD  Cardiac: RRR; +S1/S2  Pulm: CTA B/L; no W/R/R  GI: soft, NT/ND, +BS  Extremities: WWP; no edema, clubbing or cyanosis  Vasc: 2+ radial, DP pulses B/L  Neuro: AAOx3; no focal deficits    MEDICATIONS:  MEDICATIONS  (STANDING):  dextrose 5%. 500 milliLiter(s) (250 mL/Hr) IV Continuous <Continuous>  enoxaparin Injectable 40 milliGRAM(s) SubCutaneous every 24 hours  levothyroxine 25 MICROGram(s) Oral daily  piperacillin/tazobactam IVPB.. 4.5 Gram(s) IV Intermittent every 8 hours  vancomycin  IVPB 1000 milliGRAM(s) IV Intermittent every 12 hours    MEDICATIONS  (PRN):  acetaminophen     Tablet .. 650 milliGRAM(s) Oral every 6 hours PRN Temp greater or equal to 38C (100.4F), Mild Pain (1 - 3)  melatonin 3 milliGRAM(s) Oral at bedtime PRN Insomnia      ALLERGIES:  Allergies    No Known Allergies    Intolerances        LABS:                        9.9    14.13 )-----------( 436      ( 11 Sep 2023 05:47 )             28.8     09-11    130<L>  |  96  |  8   ----------------------------<  127<H>  3.7   |  24  |  0.47<L>    Ca    9.5      11 Sep 2023 05:47  Phos  2.6     09-11  Mg     1.9     09-11    TPro  6.4  /  Alb  2.9<L>  /  TBili  0.5  /  DBili  x   /  AST  29  /  ALT  22  /  AlkPhos  109  09-11        RADIOLOGY & ADDITIONAL TESTS: Reviewed. ** INCOMPLETE **    OVERNIGHT EVENTS: NAEO    SUBJECTIVE: Patient is seen at bedside, resting comfortably. She is sleepy, states she is sleepy because she took a melatonin and was woken up throughout the night for X-ray and other tests. She is AAOx3, denies pain currently. Denies CP, SOB, abdominal pain, nausea, vomiting, fever, chills. She denies feeling of excess thirst and states that she is drinking water as normal (about 8 cups per day).   Patient has a long history of osteoporosis, which she states was diagnosed over 20 years ago. She states that she had a fall earlier this year in May 2023 after taking Ambien. Her most recent fall occurred while she was walking outside with her .   She lives with her , seen at bedside, and her 2 sons live nearby in the city. They are currently residing at [address] [place] [address]. They moved here in April 2022 from [place], and patient was ambulatory at baseline prior to her first fall in May of this year.    VITAL SIGNS:  Vital Signs Last 24 Hrs  T(C): 37.7 (11 Sep 2023 04:00), Max: 37.7 (11 Sep 2023 04:00)  T(F): 99.9 (11 Sep 2023 04:00), Max: 99.9 (11 Sep 2023 04:00)  HR: 84 (11 Sep 2023 04:00) (78 - 87)  BP: 161/78 (11 Sep 2023 04:00) (140/77 - 161/90)  BP(mean): 106 (11 Sep 2023 04:00) (106 - 106)  RR: 16 (11 Sep 2023 04:00) (16 - 16)  SpO2: 96% (11 Sep 2023 04:00) (95% - 96%)    Parameters below as of 11 Sep 2023 04:00  Patient On (Oxygen Delivery Method): room air        PHYSICAL EXAM:  General: NAD; speaking in full sentences  HEENT: NC/AT; PERRL; EOMI; MMM  Neck: supple; no JVD  Cardiac: RRR; +S1/S2  Pulm: CTA B/L; no W/R/R  GI: soft, NT/ND, +BS  Extremities: WWP; no edema, clubbing or cyanosis  Vasc: 2+ radial, DP pulses B/L  Neuro: AAOx3; no focal deficits    MEDICATIONS:  MEDICATIONS  (STANDING):  dextrose 5%. 500 milliLiter(s) (250 mL/Hr) IV Continuous <Continuous>  enoxaparin Injectable 40 milliGRAM(s) SubCutaneous every 24 hours  levothyroxine 25 MICROGram(s) Oral daily  piperacillin/tazobactam IVPB.. 4.5 Gram(s) IV Intermittent every 8 hours  vancomycin  IVPB 1000 milliGRAM(s) IV Intermittent every 12 hours    MEDICATIONS  (PRN):  acetaminophen     Tablet .. 650 milliGRAM(s) Oral every 6 hours PRN Temp greater or equal to 38C (100.4F), Mild Pain (1 - 3)  melatonin 3 milliGRAM(s) Oral at bedtime PRN Insomnia      ALLERGIES:  Allergies    No Known Allergies    Intolerances        LABS:                        9.9    14.13 )-----------( 436      ( 11 Sep 2023 05:47 )             28.8     09-11    130<L>  |  96  |  8   ----------------------------<  127<H>  3.7   |  24  |  0.47<L>    Ca    9.5      11 Sep 2023 05:47  Phos  2.6     09-11  Mg     1.9     09-11    TPro  6.4  /  Alb  2.9<L>  /  TBili  0.5  /  DBili  x   /  AST  29  /  ALT  22  /  AlkPhos  109  09-11        RADIOLOGY & ADDITIONAL TESTS: Reviewed. ** INCOMPLETE **    OVERNIGHT EVENTS: NAEO    SUBJECTIVE: Patient is seen at bedside, resting comfortably. She is sleepy, states she is sleepy because she took a melatonin and was woken up throughout the night for X-ray and other tests. She is AAOx3, denies pain currently. Denies CP, SOB, abdominal pain, nausea, vomiting, fever, chills. She denies feeling of excess thirst and states that she is drinking water as normal (about 8 cups per day).   Patient has a long history of osteoporosis, which she states was diagnosed over 20 years ago. She states that she had a fall earlier this year in May 2023 after taking Ambien. Her most recent fall occurred while she was walking outside with her .   She lives with her , seen at bedside, and her 2 sons live nearby in the city. The patient and her  are currently residing at [address] [place] [address] and [address] [place] [address]. They moved here in April 2022 from [place], and patient was ambulatory at baseline prior to her first fall in May of this year.    VITAL SIGNS:  Vital Signs Last 24 Hrs  T(C): 37.7 (11 Sep 2023 04:00), Max: 37.7 (11 Sep 2023 04:00)  T(F): 99.9 (11 Sep 2023 04:00), Max: 99.9 (11 Sep 2023 04:00)  HR: 84 (11 Sep 2023 04:00) (78 - 87)  BP: 161/78 (11 Sep 2023 04:00) (140/77 - 161/90)  BP(mean): 106 (11 Sep 2023 04:00) (106 - 106)  RR: 16 (11 Sep 2023 04:00) (16 - 16)  SpO2: 96% (11 Sep 2023 04:00) (95% - 96%)    Parameters below as of 11 Sep 2023 04:00  Patient On (Oxygen Delivery Method): room air        PHYSICAL EXAM:  General: NAD; speaking in full sentences. Wound clean, dry, intact, packed and covered, nontender to swab.  HEENT: NC/AT; PERRL; EOMI; MMM  Neck: supple; no JVD  Cardiac: RRR; +S1/S2  Pulm: CTA B/L; no W/R/R  GI: soft, NT/ND, +BS  Extremities: WWP; some swelling but no pitting edema; no clubbing or cyanosis  Vasc: 2+ radial, DP pulses B/L  Neuro: AAOx3; no focal deficits    MEDICATIONS:  MEDICATIONS  (STANDING):  dextrose 5%. 500 milliLiter(s) (250 mL/Hr) IV Continuous <Continuous>  enoxaparin Injectable 40 milliGRAM(s) SubCutaneous every 24 hours  levothyroxine 25 MICROGram(s) Oral daily  piperacillin/tazobactam IVPB.. 4.5 Gram(s) IV Intermittent every 8 hours  vancomycin  IVPB 1000 milliGRAM(s) IV Intermittent every 12 hours    MEDICATIONS  (PRN):  acetaminophen     Tablet .. 650 milliGRAM(s) Oral every 6 hours PRN Temp greater or equal to 38C (100.4F), Mild Pain (1 - 3)  melatonin 3 milliGRAM(s) Oral at bedtime PRN Insomnia      ALLERGIES:  Allergies    No Known Allergies    Intolerances        LABS:                        9.9    14.13 )-----------( 436      ( 11 Sep 2023 05:47 )             28.8     09-11    130<L>  |  96  |  8   ----------------------------<  127<H>  3.7   |  24  |  0.47<L>    Ca    9.5      11 Sep 2023 05:47  Phos  2.6     09-11  Mg     1.9     09-11    TPro  6.4  /  Alb  2.9<L>  /  TBili  0.5  /  DBili  x   /  AST  29  /  ALT  22  /  AlkPhos  109  09-11        RADIOLOGY & ADDITIONAL TESTS: Reviewed. ** INCOMPLETE **    OVERNIGHT EVENTS: NAEO    SUBJECTIVE: Patient is seen at bedside, resting comfortably. She is sleepy, states she is sleepy because she took a melatonin and was woken up throughout the night for X-ray and other tests. She is AAOx3, denies pain currently. Denies CP, SOB, abdominal pain, nausea, vomiting, fever, chills. She denies feeling of excess thirst and states that she is drinking water as normal (about 8 cups per day).   Patient has a long history of osteoporosis, which she states was diagnosed over 20 years ago. She states that she had a fall earlier this year in May 2023 after taking Ambien. Her most recent fall occurred while she was walking outside with her .   She was hypertensive to 161/78 in the AM but now normotensive 122/88. She has never been diagnosed with hypertension, and her  states that at baseline she has low blood pressure. She has been taking Sertraline for a "long time, can't say how long" but longer than 10 years, and she has continued to take it throughout her hospital stays.  Patient lives with her , seen at bedside, and her 2 sons live nearby in the city. The patient and her  are currently residing at [address] [place] [address] and [address] [place] [address]. They moved here in April 2022 from [place], and patient was ambulatory at baseline prior to her first fall in May of this year.    VITAL SIGNS:  Vital Signs Last 24 Hrs  T(C): 37.7 (11 Sep 2023 04:00), Max: 37.7 (11 Sep 2023 04:00)  T(F): 99.9 (11 Sep 2023 04:00), Max: 99.9 (11 Sep 2023 04:00)  HR: 84 (11 Sep 2023 04:00) (78 - 87)  BP: 161/78 (11 Sep 2023 04:00) (140/77 - 161/90)  BP(mean): 106 (11 Sep 2023 04:00) (106 - 106)  RR: 16 (11 Sep 2023 04:00) (16 - 16)  SpO2: 96% (11 Sep 2023 04:00) (95% - 96%)    Parameters below as of 11 Sep 2023 04:00  Patient On (Oxygen Delivery Method): room air        PHYSICAL EXAM:  General: NAD; speaking in full sentences. Wound clean, dry, intact, packed and covered, nontender to swab.  HEENT: NC/AT; PERRL; EOMI; MMM  Neck: supple; no JVD  Cardiac: RRR; +S1/S2  Pulm: CTA B/L; no W/R/R  GI: soft, NT/ND, +BS  Extremities: WWP; some swelling but no pitting edema; no clubbing or cyanosis  Vasc: 2+ radial, DP pulses B/L  Neuro: AAOx3; no focal deficits    MEDICATIONS:  MEDICATIONS  (STANDING):  dextrose 5%. 500 milliLiter(s) (250 mL/Hr) IV Continuous <Continuous>  enoxaparin Injectable 40 milliGRAM(s) SubCutaneous every 24 hours  levothyroxine 25 MICROGram(s) Oral daily  piperacillin/tazobactam IVPB.. 4.5 Gram(s) IV Intermittent every 8 hours  vancomycin  IVPB 1000 milliGRAM(s) IV Intermittent every 12 hours    MEDICATIONS  (PRN):  acetaminophen     Tablet .. 650 milliGRAM(s) Oral every 6 hours PRN Temp greater or equal to 38C (100.4F), Mild Pain (1 - 3)  melatonin 3 milliGRAM(s) Oral at bedtime PRN Insomnia      ALLERGIES:  Allergies    No Known Allergies    Intolerances        LABS:                        9.9    14.13 )-----------( 436      ( 11 Sep 2023 05:47 )             28.8     09-11    130<L>  |  96  |  8   ----------------------------<  127<H>  3.7   |  24  |  0.47<L>    Ca    9.5      11 Sep 2023 05:47  Phos  2.6     09-11  Mg     1.9     09-11    TPro  6.4  /  Alb  2.9<L>  /  TBili  0.5  /  DBili  x   /  AST  29  /  ALT  22  /  AlkPhos  109  09-11        RADIOLOGY & ADDITIONAL TESTS: Reviewed. OVERNIGHT EVENTS: NAEO    SUBJECTIVE: Patient is seen at bedside, resting comfortably. She is sleepy, states she is sleepy because she took a melatonin and was woken up throughout the night for X-ray and other tests. She denies pain currently. Denies CP, SOB, abdominal pain, nausea, vomiting, fever, chills. She denies feeling of excess thirst and states that she is drinking water as normal (about 8 cups per day).   Patient has a long history of osteoporosis, which she states was diagnosed over 20 years ago. She states that she had a fall earlier this year in May 2023 after taking Ambien. Her most recent fall occurred while she was walking outside with her .   She was hypertensive to 161/78 in the AM but now normotensive 122/88. She has never been diagnosed with hypertension, and her  states that at baseline she has low blood pressure. She has been taking Sertraline for a "long time, can't say how long" but longer than 10 years, and she has continued to take it throughout her hospital stays.  Patient lives with her , seen at bedside, and her 2 sons live nearby in the city. The patient and her  are currently residing at [address] [place] [address] and [address] [place] [address]. They moved here in April 2022 from [place], and patient was ambulatory at baseline prior to her first fall in May of this year.    VITAL SIGNS:  Vital Signs Last 24 Hrs  T(C): 37.7 (11 Sep 2023 04:00), Max: 37.7 (11 Sep 2023 04:00)  T(F): 99.9 (11 Sep 2023 04:00), Max: 99.9 (11 Sep 2023 04:00)  HR: 84 (11 Sep 2023 04:00) (78 - 87)  BP: 161/78 (11 Sep 2023 04:00) (140/77 - 161/90)  BP(mean): 106 (11 Sep 2023 04:00) (106 - 106)  RR: 16 (11 Sep 2023 04:00) (16 - 16)  SpO2: 96% (11 Sep 2023 04:00) (95% - 96%)    Parameters below as of 11 Sep 2023 04:00  Patient On (Oxygen Delivery Method): room air        PHYSICAL EXAM:  General: NAD; speaking in full sentences.   HEENT: PERRL; EOMI; MMM  Neck: supple; no JVD  Cardiac: RRR; +S1/S2, no murmurs of gallops  Pulm: CTA B/L; no W/R/R  GI: soft, NT/ND, +BS  Extremities: WWP; some swelling but no pitting edema; no clubbing or cyanosis  Skin: Left hip post operative wound clean, dry, intact, packed and covered, nontender to swab.  Vasc: 2+ radial, DP pulses B/L  Neuro: AAOx3; no focal deficits    MEDICATIONS:  MEDICATIONS  (STANDING):  dextrose 5%. 500 milliLiter(s) (250 mL/Hr) IV Continuous <Continuous>  enoxaparin Injectable 40 milliGRAM(s) SubCutaneous every 24 hours  levothyroxine 25 MICROGram(s) Oral daily  piperacillin/tazobactam IVPB.. 4.5 Gram(s) IV Intermittent every 8 hours  vancomycin  IVPB 1000 milliGRAM(s) IV Intermittent every 12 hours    MEDICATIONS  (PRN):  acetaminophen     Tablet .. 650 milliGRAM(s) Oral every 6 hours PRN Temp greater or equal to 38C (100.4F), Mild Pain (1 - 3)  melatonin 3 milliGRAM(s) Oral at bedtime PRN Insomnia      ALLERGIES:  Allergies    No Known Allergies    Intolerances        LABS:                        9.9    14.13 )-----------( 436      ( 11 Sep 2023 05:47 )             28.8     09-11    130<L>  |  96  |  8   ----------------------------<  127<H>  3.7   |  24  |  0.47<L>    Ca    9.5      11 Sep 2023 05:47  Phos  2.6     09-11  Mg     1.9     09-11    TPro  6.4  /  Alb  2.9<L>  /  TBili  0.5  /  DBili  x   /  AST  29  /  ALT  22  /  AlkPhos  109  09-11        RADIOLOGY & ADDITIONAL TESTS: Reviewed.

## 2023-09-11 NOTE — CONSULT NOTE ADULT - SUBJECTIVE AND OBJECTIVE BOX
Orthopaedic Surgery Consult Note    For Surgeon:    HPI:  Patient is a 83y old  Female who presents from [facility] f wound check  Patient is now 8 days out from  R hip zion 2/2 Garden IV FNF.     Patient denies recent trauma. Patient w/ son at bedside.   Endorses patient has been mostly bed bound. Gets up to work with PT twice a day.    Now with ulcerations of skin in proximity to incision.      PMHx -  Denies  PSx -  Denies  Social (-)Tobacco (-)EtOh (-)Elicit substance       Vital Signs Last 24 Hrs  T(C): 36.9 (10 Sep 2023 23:22), Max: 36.9 (10 Sep 2023 21:44)  T(F): 98.4 (10 Sep 2023 23:22), Max: 98.4 (10 Sep 2023 21:44)  HR: 87 (10 Sep 2023 23:22) (82 - 87)  BP: 161/90 (10 Sep 2023 23:22) (140/77 - 161/90)  BP(mean): --  RR: 16 (10 Sep 2023 23:22) (16 - 16)  SpO2: 95% (10 Sep 2023 23:22) (95% - 96%)    Parameters below as of 10 Sep 2023 23:22  Patient On (Oxygen Delivery Method): room air        Physical Exam:  AVSS   Gen: NAD, AOx3     LLE  2x2 ulcerations near incision x2. With no active drainage. No bleeding  Able to flex/ext at knee, PF/DF intact, toes mobile   EHL/TA/GS intact to motor   SILT Saph/dionicio/sp/dp/tib   Palpable DP & PT   Brisk cap refill distal                        10.0   16.51 )-----------( 452      ( 10 Sep 2023 22:51 )             28.6     09-11    x   |  97  |  7   ----------------------------<  163<H>  4.0   |  25  |  x     Ca    9.4      11 Sep 2023 00:58    TPro  6.9  /  Alb  3.1<L>  /  TBili  0.5  /  DBili  x   /  AST  37  /  ALT  27  /  AlkPhos  116  09-10      Imaging:     A/P: 83yFemale    -Discussed with Dr. Hutchinson Pager [phone]   Orthopaedic Surgery Consult Note    For Surgeon: Dr. Arita    HPI:  Patient is a 83y old  Female who presents from [facility] f wound check  Patient is now 8 days out from  R hip zion 2/2 Garden IV FNF.     Patient denies recent trauma. Patient w/ son at bedside.   Endorses patient has been mostly bed bound. Gets up to work with PT twice a day.    Now with ulcerations of skin in proximity to incision.      PMHx -  Denies  PSx -  Denies  Social (-)Tobacco (-)EtOh (-)Elicit substance       Vital Signs Last 24 Hrs  T(C): 36.9 (10 Sep 2023 23:22), Max: 36.9 (10 Sep 2023 21:44)  T(F): 98.4 (10 Sep 2023 23:22), Max: 98.4 (10 Sep 2023 21:44)  HR: 87 (10 Sep 2023 23:22) (82 - 87)  BP: 161/90 (10 Sep 2023 23:22) (140/77 - 161/90)  BP(mean): --  RR: 16 (10 Sep 2023 23:22) (16 - 16)  SpO2: 95% (10 Sep 2023 23:22) (95% - 96%)    Parameters below as of 10 Sep 2023 23:22  Patient On (Oxygen Delivery Method): room air        Physical Exam:  AVSS   Gen: NAD, AOx3     LLE  2x2 ulcerations near incision x2. With no active drainage. No bleeding  Able to flex/ext at knee, PF/DF intact, toes mobile   EHL/TA/GS intact to motor   SILT Saph/dionicio/sp/dp/tib   Palpable DP & PT   Brisk cap refill distal                        10.0   16.51 )-----------( 452      ( 10 Sep 2023 22:51 )             28.6     09-11    x   |  97  |  7   ----------------------------<  163<H>  4.0   |  25  |  x     Ca    9.4      11 Sep 2023 00:58    TPro  6.9  /  Alb  3.1<L>  /  TBili  0.5  /  DBili  x   /  AST  37  /  ALT  27  /  AlkPhos  116  09-10      Imaging: s/p L hip zion. Hardware intact    A/P: 83yFemale w/ new wounds around incision. Skin irritation from Aquacel dressing. No wound dehiscence noted  - Daily dressing changes w/ xeroform, 4x4, and paper tape  - No acute orthopedic surgical intervention at this time    -Discussed with Dr. Arita    Ortho Pager [phone]

## 2023-09-11 NOTE — PHYSICAL THERAPY INITIAL EVALUATION ADULT - NEUROVASCULAR ASSESSMENT LUE
[facility]  Outpatient Occupational [employer]  CANCEL/ NO SHOW NOTE    Date: 2018  Patient Name: Boo Austin        MRN: [id number]    [facility] #: [id number]  : 2013  (4 y.o.)  Gender: male             REASON FOR MISSED TREATMENT:    []Cancelled due to illness. []Therapist cancelled appointment  []Cancelled due to other appointment   []No show / No call. Pt called with next scheduled appointment. []Cancelled due to transportation conflict  []Cancelled due to weather  []Frequency of order changed  []Patient on hold due to:   [x]OTHER:cx due to clinic closed early due to holiday.         Electronically signed by:    Lorie SARGENT            Date:2018
[facility]  Outpatient Occupational Ther[employer]  CANCEL/ NO SHOW NOTE    Date: 2018  Patient Name: Destinee Law        MRN: [id number]    [facility] #: [id number]  : 2013  (4 y.o.)  Gender: male             REASON FOR MISSED TREATMENT:    []Cancelled due to illness. []Therapist cancelled appointment  []Cancelled due to other appointment   []No show / No call. Pt called with next scheduled appointment. []Cancelled due to transportation conflict  []Cancelled due to weather  []Frequency of order changed  []Patient on hold due to:   [x]OTHER:  Therapist cx due to holiday and clinic closed early.      Electronically signed by:    Elma SARGENT            Date:2018
warm

## 2023-09-11 NOTE — H&P ADULT - PROBLEM SELECTOR PLAN 2
Patient with Na 118 on arrival improved to 123 w/o intervention in ED. On chart review was hyponatremic to low 130/high 120's on last admission however not this low. Was previously dx with pre renal hypoNa by hospitalist following based on Enma 20.   -500cc bolus NS   AM BMP  mentating at baseline Patient with Na 118 on arrival improved to 123 w/o intervention in ED. On chart review was hyponatremic to low 130/high 120's on last admission however not this low. Was previously dx with pre renal hypoNa by hospitalist following based on Enma 20 so likely hypovolemic   -500cc bolus NS   -encourage PO intake   AM BMP  mentating at baseline Patient with Na 118 on arrival improved to 123 w/o intervention in ED. On chart review was hyponatremic to low 130/high 120's on last admission however not this low. Was previously dx with pre renal hypoNa by hospitalist following based on Enma 20 so likely hypovolemic   -already increased without intervention in ED.   -continue to monitor, if decreasing can give NS bolus (received at last admission)   -encourage PO intake   AM BMP  mentating at baseline Patient with Na 118 on arrival improved to 123 w/o intervention in ED. On chart review was hyponatremic to low 130/high 120's on last admission however not this low. Was previously dx with pre renal hypo Na by hospitalist following based on Enma 20 so likely hypovolemic but also on SSRI  -already increased without intervention in ED.   will give 500cc bolus, continue to monitor  -encourage PO intake   AM BMP  mentating at baseline

## 2023-09-11 NOTE — H&P ADULT - NSHPLABSRESULTS_GEN_ALL_CORE
LABS:                         10.0   16.51 )-----------( 452      ( 10 Sep 2023 22:51 )             28.6     09-11    123<L>  |  97  |  7   ----------------------------<  163<H>  4.0   |  25  |  0.43<L>    Ca    9.4      11 Sep 2023 00:58    TPro  6.9  /  Alb  3.1<L>  /  TBili  0.5  /  DBili  x   /  AST  37  /  ALT  27  /  AlkPhos  116  09-10      Urinalysis Basic - ( 11 Sep 2023 00:58 )    Color: x / Appearance: x / SG: x / pH: x  Gluc: 163 mg/dL / Ketone: x  / Bili: x / Urobili: x   Blood: x / Protein: x / Nitrite: x   Leuk Esterase: x / RBC: x / WBC x   Sq Epi: x / Non Sq Epi: x / Bacteria: x            Lactate, Blood: 1.4 mmol/L (09-11 @ 00:01)      RADIOLOGY, EKG & ADDITIONAL TESTS:

## 2023-09-11 NOTE — ED PROVIDER NOTE - OBJECTIVE STATEMENT
83yF w/ no pmhx and s/p L hip hemiarthroplasty 9/3 w/ Dr Arita and was d/c to [facility] rehab, comes in for concern of postop infection. Per family at bedside, pt has been complaining of pain and they noticed drainage to the incision site the last few days. No fevers or chills.

## 2023-09-11 NOTE — PATIENT PROFILE ADULT - FALL HARM RISK - HARM RISK INTERVENTIONS
Assistance with ambulation/Assistance OOB with selected safe patient handling equipment/Communicate Risk of Fall with Harm to all staff/Discuss with provider need for PT consult/Monitor for mental status changes/Monitor gait and stability/Provide patient with walking aids - walker, cane, crutches/Reinforce activity limits and safety measures with patient and family/Reorient to person, place and time as needed/Review medications for side effects contributing to fall risk/Tailored Fall Risk Interventions/Toileting schedule using arm’s reach rule for commode and bathroom/Use of alarms - bed, chair and/or voice tab/Visual Cue: Yellow wristband and red socks/Bed in lowest position, wheels locked, appropriate side rails in place/Call bell, personal items and telephone in reach/Instruct patient to call for assistance before getting out of bed or chair/Non-slip footwear when patient is out of bed/[place] to call system/Physically safe environment - no spills, clutter or unnecessary equipment/Purposeful Proactive Rounding/Room/bathroom lighting operational, light cord in reach

## 2023-09-11 NOTE — H&P ADULT - PROBLEM SELECTOR PLAN 1
Patient 8 days post op coming in with +WBC, +ESR, +CRP, and concern for surgical site infection given pain to left hip and drainage that the family noticed. Orthopedics consulted in ED who did surgery and stated *** no sx of cough, diarrhea, n/v, URI sx. S/p vanc / zosyn 3.375 in ED  -C/w antibiotic coverage vanc and zosyn  -f/u bcx   -f/u ortho recs

## 2023-09-11 NOTE — ED PROVIDER NOTE - PHYSICAL EXAMINATION
CONST: nontoxic NAD   HEAD: atraumatic  EYES: conjunctivae clear  NECK: supple  CARD: regular rate  CHEST: breathing comfortably, no stridor/retractions/tripoding  EXT: L hip incision not erythematous but + liquidy brown discharge and tenderness  SKIN: warm, dry  NEURO: awake alert following commands moving all extremities CONST: nontoxic NAD   HEAD: atraumatic  EYES: conjunctivae clear  NECK: supple  CARD: regular rate  CHEST: breathing comfortably, no stridor/retractions/tripoding  EXT: L hip incision not erythematous but + liquidy brown discharge and tenderness around it, noted abrasion/skin tears as well  SKIN: warm, dry  NEURO: awake alert following commands moving all extremities

## 2023-09-11 NOTE — CONSULT NOTE ADULT - ASSESSMENT
I M     83 y o female with PMHx of hypothyroidism and anxiety who presents to the ED 8 days after right hemiarthroplasty with concern of postop infection    Problem/Plan - 1:  ·  Problem: Surgical site infection.   ·  Plan: Patient 8 days post op coming in with +WBC, +ESR, +CRP, and concern for surgical site infection given pain to left hip and drainage that the family noticed. Orthopedics consulted in ED who did surgery and stated *** no sx of cough, diarrhea, n/v, URI sx. S/p vanc / zosyn 3.375 in ED  -C/w antibiotic coverage vanc and zosyn  -f/u bcx   -f/u ortho recs.    Problem/Plan - 2:  ·  Problem: Hyponatremia.   ·  Plan: Patient with Na 118 on arrival improved to 123 w/o intervention in ED. On chart review was hyponatremic to low 130/high 120's on last admission however not this low. Was previously dx with pre renal hypo Na by hospitalist following based on Enma 20 so likely hypovolemic but also on SSRI  -already increased without intervention in ED.   will give 500cc bolus, continue to monitor  -encourage PO intake   AM BMP  mentating at baseline.    Problem/Plan - 3:  ·  Problem: Hypothyroidism.   ·  Plan: cw synthroid 25 home.    Problem/Plan - 4:  ·  Problem: Nutrition, metabolism, and development symptoms.   ·  Plan: F: None   E: Replete as necessary K>4 Mg>2  N: DASH diet   DVT Prophylaxis: Lovenox 40mg daily   GI prophylaxis: None   CODE STATUS: FULL.  
85Y F w/hx of hypothyroid recent  presented to the ED for concern of post op infection found to be hyponatremic to 118 asymptomatic, given 500mL NS, overcorrected to 131, consulted for overcorrection       Chronic asymptomatic hyponatremia   sNa on admission 118, given 500ml of NS now repeat at 131  Urine osm of 79 and Urine Na 20 etiology consistent with tea and toast   Patient also with hx of Hyothyroid, also noted to be on SSRI   Previous hyponatremia likley due to decreased PO intake along given urine osm, as had normal Na back in may 9th 2023 of 139    Plan:  Give 1mcg of DDAVP IVP stat   Give 1.5L D5W bolus and repeat BMP with in 30 min after bolus completed   Goal sNa of 122-124 by 11PM 9/11  Encourage PO intake   Fluid restriction to 1L while on DDAVP as further water intake will decrease sNa  Q4hrs BMP, Urine Na, Urine Osm   Encourage PO intake   Please send Uric acid, TSH, FT4 and AM cortisol

## 2023-09-11 NOTE — CONSULT NOTE ADULT - SUBJECTIVE AND OBJECTIVE BOX
Patient is a 83y old  Female who presents with a chief complaint of Leukocytosis after Ortho surgery (11 Sep 2023 08:23)      HPI:  HPI:  The patient is a 82yo female with PMHx of hypothyroidism and anxiety who presents to the ED 8 days after left hemiarthroplasty with concern of postop infection. Per family at bedside, pt has been complaining of pain and they noticed drainage to the incision site the last few days. No fevers or chills/malaise. Patient was d/c to [facility] on last admission.     In ED   VSS, afebrile  Labs note able for leukocytosis 14, ESR 62, CRP 42, Na 118-->123. lactate wnl  Patient received vanc, zosyn, melatonin in ED.  Ortho consulted and will follow   (11 Sep 2023 02:44)    PAST MEDICAL & SURGICAL HISTORY:  Chronic UTI        MEDICATIONS  (STANDING):  dextrose 5%. 500 milliLiter(s) (250 mL/Hr) IV Continuous <Continuous>  enoxaparin Injectable 40 milliGRAM(s) SubCutaneous every 24 hours  levothyroxine 25 MICROGram(s) Oral daily  piperacillin/tazobactam IVPB.. 4.5 Gram(s) IV Intermittent every 8 hours  vancomycin  IVPB 1000 milliGRAM(s) IV Intermittent every 12 hours    MEDICATIONS  (PRN):  acetaminophen     Tablet .. 650 milliGRAM(s) Oral every 6 hours PRN Temp greater or equal to 38C (100.4F), Mild Pain (1 - 3)  melatonin 3 milliGRAM(s) Oral at bedtime PRN Insomnia            FAMILY HISTORY:      CBC Full  -  ( 11 Sep 2023 05:47 )  WBC Count : 14.13 K/uL  RBC Count : 3.43 M/uL  Hemoglobin : 9.9 g/dL  Hematocrit : 28.8 %  Platelet Count - Automated : 436 K/uL  Mean Cell Volume : 84.0 fl  Mean Cell Hemoglobin : 28.9 pg  Mean Cell Hemoglobin Concentration : 34.4 gm/dL  Auto Neutrophil # : 11.27 K/uL  Auto Lymphocyte # : 1.69 K/uL  Auto Monocyte # : 0.90 K/uL  Auto Eosinophil # : 0.03 K/uL  Auto Basophil # : 0.04 K/uL  Auto Neutrophil % : 79.7 %  Auto Lymphocyte % : 12.0 %  Auto Monocyte % : 6.4 %  Auto Eosinophil % : 0.2 %  Auto Basophil % : 0.3 %      09-11    130<L>  |  96  |  8   ----------------------------<  127<H>  3.7   |  24  |  0.47<L>    Ca    9.5      11 Sep 2023 05:47  Phos  2.6     09-11  Mg     1.9     09-11    TPro  6.4  /  Alb  2.9<L>  /  TBili  0.5  /  DBili  x   /  AST  29  /  ALT  22  /  AlkPhos  109  09-11      Urinalysis Basic - ( 11 Sep 2023 05:47 )    Color: x / Appearance: x / SG: x / pH: x  Gluc: 127 mg/dL / Ketone: x  / Bili: x / Urobili: x   Blood: x / Protein: x / Nitrite: x   Leuk Esterase: x / RBC: x / WBC x   Sq Epi: x / Non Sq Epi: x / Bacteria: x        Radiology :               Review of Systems : per HPI         Vital Signs Last 24 Hrs  T(C): 37.7 (11 Sep 2023 04:00), Max: 37.7 (11 Sep 2023 04:00)  T(F): 99.9 (11 Sep 2023 04:00), Max: 99.9 (11 Sep 2023 04:00)  HR: 84 (11 Sep 2023 04:00) (78 - 87)  BP: 161/78 (11 Sep 2023 04:00) (140/77 - 161/90)  BP(mean): 106 (11 Sep 2023 04:00) (106 - 106)  RR: 16 (11 Sep 2023 04:00) (16 - 16)  SpO2: 96% (11 Sep 2023 04:00) (95% - 96%)    Parameters below as of 11 Sep 2023 04:00  Patient On (Oxygen Delivery Method): room air            Physical Exam :  83 y o woman lying comfortably in semi [place]'s position , awake , alert , no acute complaints     Head : normocephalic , atraumatic    Eyes : PERRLA , EOMI , no nystagmus , sclera anicteric    ENT / FACE : neg nasal discharge , uvula midline , no oropharyngeal erythema / exudate    Neck : supple , negative JVD , negative carotid bruits , no thyromegaly    Chest : CTA bilaterally , neg wheeze / rhonchi / rales / crackles / egophany    Cardiovascular : regular rate and rhythm , neg murmurs / rubs / gallops    Abdomen : soft , non distended , non tender to palpation in all 4 quadrants ,  normal bowel sounds     Extremities : WWP , neg cyanosis /clubbing / edema     Musculoskeletal :  L hip dressing with drainage       Skin :       :     Neurologic Exam :    Alert and oriented x 3     Motor Exam:          Right UE:               no focal weakness ,  > 3+/5 throughout      Left UE:                 no focal weakness ,  > 3+/5 throughout        Right LE:    no focal weakness ,  > 3+/5 throughout      Left LE:    no focal weakness ,  > 3+/5 throughout         Sensation :         intact to light touch x 4 extremities       DTR :     biceps/brachioradialis : equal                      patella/ankle : equal        Gait :  not tested            PM&R Impression :     admitted for c/o right hemiarthroplasty with concern of postop infection         Recommendations / Plan :      1) Physical / Occupational therapy focusing on therapeutic exercises , equipment evaluation , bed mobility/transfer out of bed evaluation , progressive ambulation with assistive devices prn .    2) Current disposition plan recommendation  :    pending functional progress

## 2023-09-11 NOTE — PROGRESS NOTE ADULT - PROBLEM SELECTOR PLAN 2
Patient with Na 118 on arrival improved to 123 w/o intervention in ED. On chart review was hyponatremic to low 130/high 120's on last admission however not this low. Was previously dx with pre renal hypo Na by hospitalist following based on Enma 20 so likely hypovolemic but also on SSRI  -already increased without intervention in ED.   will give 500cc bolus, continue to monitor  -encourage PO intake   AM BMP  mentating at baseline Patient with Na 118 on arrival improved to 123 w/o intervention in ED, now 130->131. On chart review was hyponatremic to low 130/high 120's on last admission however not this low. Was previously dx with pre renal hypo Na by hospitalist following based on Enma 20 so likely hypovolemic but also on SSRI  -already increased without intervention in ED.   will give 500cc bolus, continue to monitor  -encourage PO intake   - non-diabetes A1c 6.1  -f/u BMP  mentating at baseline Patient with Na 118 on arrival improved to 123 w/o intervention in ED, now 130->131. On chart review was hyponatremic to low 130/high 120's on last admission however not this low. Was previously dx with pre renal hypo Na by hospitalist following based on Enma 20 so likely hypovolemic. Serum osmolality slightly low 278, urine osmolality low 79. Could be baseline hyponatremia d/t Sertraline side effect.  -encourage PO intake   - non-diabetic A1c 6.1  -f/u BMP  mentating at baseline Patient with Na 118 on arrival improved to 123 w/o intervention in ED, now 130->131. On chart review was hyponatremic to low 130/high 120's on last admission however not this low. Was previously dx with pre renal hypo Na by hospitalist following based on Enma 20 so likely hypovolemic. Serum osmolality slightly low 278, urine osmolality low 79. Could be baseline hyponatremia d/t Sertraline side effect lyn in elderly.  -encourage PO intake   - non-diabetic A1c 6.1  -f/u BMP  mentating at baseline Patient with Na 118 on arrival improved to 123 w/o intervention in ED, now 130->131. On chart review was hyponatremic to low 130/high 120's on last admission however not this low. Was previously dx with pre renal hypo Na by hospitalist following based on Enma 20 so likely hypovolemic. Serum osmolality slightly low 278, urine osmolality low 79. Could be influenced by Sertraline side effect lyn in elderly.  -encourage PO intake   - non-diabetic A1c 6.1  -f/u BMP  mentating at baseline Patient with Na 118 on arrival improved to 123 w/o intervention in ED, 130 this Am. Received D5W this AM and repeat Na was 131. On chart review was hyponatremic to low 130/high 120's on last admission however not this low. Was previously dx with hypovolemic hypoNa. Of note, pt has also been on Sertraline for many years.   Today, the pt is urinating frequently and large amounts.   Serum osmolality 278, urine osmolality 79. Urine Na 20, Spe Grav <=1.005.  Plan:  - Will give another 500ml of D5W now, recheck Na at 4pm   - Renal consult for overcorrection  - non-diabetic A1c 6.1 Patient with Na 118 on arrival improved to 123 w/o intervention in ED, 130 this Am. Received D5W this AM and repeat Na was 131. On chart review was hyponatremic to low 130/high 120's on last admission however not this low. Was previously dx with hypovolemic hypoNa. Of note, pt has also been on Sertraline for many years.   Today, the pt is urinating frequently and large amounts.   Serum osmolality 278, urine osmolality 79. Urine Na 20, Spe Grav <=1.005.  Plan:  - Renal consulted, will give 1mcg of DDAVP now and 1.5L of D5W followed by repeat BMP with the goal of Na 124 by this PM.   - F/u BMP at 4pm  - non-diabetic A1c 6.1

## 2023-09-11 NOTE — PATIENT PROFILE ADULT - STATED REASON FOR ADMISSION
Patient  fell last Saturday and came to the ER. She had Hemiarthroplasty of left hip on Sunday, 9/3. Patient was sent to [facility]. She was sent to ER for check of the incision site, which now has 2 open draining wound above and below the incision site.

## 2023-09-11 NOTE — CONSULT NOTE ADULT - ATTENDING COMMENTS
overcorrection of hyponatremia possibly due to NS or holding SSRI  agree with plan as above to re-lower Na  urine studies with low uosm and low ur na likely reflects response to hyponatremia with appropriate suppression of ADH and dilute urine

## 2023-09-12 LAB
ALBUMIN SERPL ELPH-MCNC: 2.6 G/DL — LOW (ref 3.3–5)
ALP SERPL-CCNC: 102 U/L — SIGNIFICANT CHANGE UP (ref 40–120)
ALT FLD-CCNC: 22 U/L — SIGNIFICANT CHANGE UP (ref 10–45)
ANION GAP SERPL CALC-SCNC: 7 MMOL/L — SIGNIFICANT CHANGE UP (ref 5–17)
ANION GAP SERPL CALC-SCNC: 8 MMOL/L — SIGNIFICANT CHANGE UP (ref 5–17)
ANION GAP SERPL CALC-SCNC: 8 MMOL/L — SIGNIFICANT CHANGE UP (ref 5–17)
AST SERPL-CCNC: 24 U/L — SIGNIFICANT CHANGE UP (ref 10–40)
BILIRUB SERPL-MCNC: 0.3 MG/DL — SIGNIFICANT CHANGE UP (ref 0.2–1.2)
BUN SERPL-MCNC: 10 MG/DL — SIGNIFICANT CHANGE UP (ref 7–23)
BUN SERPL-MCNC: 10 MG/DL — SIGNIFICANT CHANGE UP (ref 7–23)
BUN SERPL-MCNC: 9 MG/DL — SIGNIFICANT CHANGE UP (ref 7–23)
BUN SERPL-MCNC: 9 MG/DL — SIGNIFICANT CHANGE UP (ref 7–23)
CALCIUM SERPL-MCNC: 8.8 MG/DL — SIGNIFICANT CHANGE UP (ref 8.4–10.5)
CALCIUM SERPL-MCNC: 8.8 MG/DL — SIGNIFICANT CHANGE UP (ref 8.4–10.5)
CALCIUM SERPL-MCNC: 8.9 MG/DL — SIGNIFICANT CHANGE UP (ref 8.4–10.5)
CALCIUM SERPL-MCNC: 8.9 MG/DL — SIGNIFICANT CHANGE UP (ref 8.4–10.5)
CHLORIDE SERPL-SCNC: 87 MMOL/L — LOW (ref 96–108)
CHLORIDE SERPL-SCNC: 89 MMOL/L — LOW (ref 96–108)
CHLORIDE SERPL-SCNC: 91 MMOL/L — LOW (ref 96–108)
CO2 SERPL-SCNC: 22 MMOL/L — SIGNIFICANT CHANGE UP (ref 22–31)
CO2 SERPL-SCNC: 22 MMOL/L — SIGNIFICANT CHANGE UP (ref 22–31)
CO2 SERPL-SCNC: 24 MMOL/L — SIGNIFICANT CHANGE UP (ref 22–31)
CO2 SERPL-SCNC: 24 MMOL/L — SIGNIFICANT CHANGE UP (ref 22–31)
CREAT SERPL-MCNC: 0.42 MG/DL — LOW (ref 0.5–1.3)
CREAT SERPL-MCNC: 0.43 MG/DL — LOW (ref 0.5–1.3)
CREAT SERPL-MCNC: 0.48 MG/DL — LOW (ref 0.5–1.3)
CREAT SERPL-MCNC: 0.54 MG/DL — SIGNIFICANT CHANGE UP (ref 0.5–1.3)
CRP SERPL-MCNC: 25 MG/L — HIGH (ref 0–4)
EGFR: 92 ML/MIN/1.73M2 — SIGNIFICANT CHANGE UP
EGFR: 95 ML/MIN/1.73M2 — SIGNIFICANT CHANGE UP
EGFR: 97 ML/MIN/1.73M2 — SIGNIFICANT CHANGE UP
EGFR: 98 ML/MIN/1.73M2 — SIGNIFICANT CHANGE UP
GLUCOSE SERPL-MCNC: 134 MG/DL — HIGH (ref 70–99)
GLUCOSE SERPL-MCNC: 135 MG/DL — HIGH (ref 70–99)
GLUCOSE SERPL-MCNC: 140 MG/DL — HIGH (ref 70–99)
GLUCOSE SERPL-MCNC: 176 MG/DL — HIGH (ref 70–99)
HCT VFR BLD CALC: 26 % — LOW (ref 34.5–45)
HGB BLD-MCNC: 8.8 G/DL — LOW (ref 11.5–15.5)
MAGNESIUM SERPL-MCNC: 1.7 MG/DL — SIGNIFICANT CHANGE UP (ref 1.6–2.6)
MCHC RBC-ENTMCNC: 29 PG — SIGNIFICANT CHANGE UP (ref 27–34)
MCHC RBC-ENTMCNC: 33.8 GM/DL — SIGNIFICANT CHANGE UP (ref 32–36)
MCV RBC AUTO: 85.8 FL — SIGNIFICANT CHANGE UP (ref 80–100)
NRBC # BLD: 0 /100 WBCS — SIGNIFICANT CHANGE UP (ref 0–0)
OSMOLALITY UR: 262 MOSM/KG — LOW (ref 300–900)
OSMOLALITY UR: 288 MOSM/KG — LOW (ref 300–900)
OSMOLALITY UR: 320 MOSM/KG — SIGNIFICANT CHANGE UP (ref 300–900)
PHOSPHATE SERPL-MCNC: 2 MG/DL — LOW (ref 2.5–4.5)
PLATELET # BLD AUTO: 416 K/UL — HIGH (ref 150–400)
POTASSIUM SERPL-MCNC: 3.5 MMOL/L — SIGNIFICANT CHANGE UP (ref 3.5–5.3)
POTASSIUM SERPL-MCNC: 3.5 MMOL/L — SIGNIFICANT CHANGE UP (ref 3.5–5.3)
POTASSIUM SERPL-MCNC: 4 MMOL/L — SIGNIFICANT CHANGE UP (ref 3.5–5.3)
POTASSIUM SERPL-MCNC: 4 MMOL/L — SIGNIFICANT CHANGE UP (ref 3.5–5.3)
POTASSIUM SERPL-SCNC: 3.5 MMOL/L — SIGNIFICANT CHANGE UP (ref 3.5–5.3)
POTASSIUM SERPL-SCNC: 3.5 MMOL/L — SIGNIFICANT CHANGE UP (ref 3.5–5.3)
POTASSIUM SERPL-SCNC: 4 MMOL/L — SIGNIFICANT CHANGE UP (ref 3.5–5.3)
POTASSIUM SERPL-SCNC: 4 MMOL/L — SIGNIFICANT CHANGE UP (ref 3.5–5.3)
PROT SERPL-MCNC: 6.1 G/DL — SIGNIFICANT CHANGE UP (ref 6–8.3)
RBC # BLD: 3.03 M/UL — LOW (ref 3.8–5.2)
RBC # FLD: 13.2 % — SIGNIFICANT CHANGE UP (ref 10.3–14.5)
SODIUM SERPL-SCNC: 119 MMOL/L — CRITICAL LOW (ref 135–145)
SODIUM SERPL-SCNC: 120 MMOL/L — CRITICAL LOW (ref 135–145)
SODIUM SERPL-SCNC: 121 MMOL/L — LOW (ref 135–145)
SODIUM UR-SCNC: 20 MMOL/L — SIGNIFICANT CHANGE UP
SODIUM UR-SCNC: 40 MMOL/L — SIGNIFICANT CHANGE UP
SODIUM UR-SCNC: 50 MMOL/L — SIGNIFICANT CHANGE UP
WBC # BLD: 13.73 K/UL — HIGH (ref 3.8–10.5)
WBC # FLD AUTO: 13.73 K/UL — HIGH (ref 3.8–10.5)

## 2023-09-12 PROCEDURE — 99232 SBSQ HOSP IP/OBS MODERATE 35: CPT | Mod: GC

## 2023-09-12 PROCEDURE — 99233 SBSQ HOSP IP/OBS HIGH 50: CPT | Mod: GC

## 2023-09-12 PROCEDURE — 99232 SBSQ HOSP IP/OBS MODERATE 35: CPT

## 2023-09-12 RX ORDER — SODIUM,POTASSIUM PHOSPHATES 278-250MG
2 POWDER IN PACKET (EA) ORAL EVERY 4 HOURS
Refills: 0 | Status: COMPLETED | OUTPATIENT
Start: 2023-09-12 | End: 2023-09-12

## 2023-09-12 RX ORDER — LANOLIN ALCOHOL/MO/W.PET/CERES
1 CREAM (GRAM) TOPICAL ONCE
Refills: 0 | Status: COMPLETED | OUTPATIENT
Start: 2023-09-12 | End: 2023-09-13

## 2023-09-12 RX ORDER — SODIUM CHLORIDE 5 G/100ML
500 INJECTION, SOLUTION INTRAVENOUS
Refills: 0 | Status: DISCONTINUED | OUTPATIENT
Start: 2023-09-12 | End: 2023-09-12

## 2023-09-12 RX ORDER — SODIUM,POTASSIUM PHOSPHATES 278-250MG
2 POWDER IN PACKET (EA) ORAL EVERY 4 HOURS
Refills: 0 | Status: DISCONTINUED | OUTPATIENT
Start: 2023-09-12 | End: 2023-09-12

## 2023-09-12 RX ORDER — POTASSIUM PHOSPHATE, MONOBASIC POTASSIUM PHOSPHATE, DIBASIC 236; 224 MG/ML; MG/ML
30 INJECTION, SOLUTION INTRAVENOUS ONCE
Refills: 0 | Status: DISCONTINUED | OUTPATIENT
Start: 2023-09-12 | End: 2023-09-12

## 2023-09-12 RX ORDER — SODIUM CHLORIDE 5 G/100ML
500 INJECTION, SOLUTION INTRAVENOUS
Refills: 0 | Status: DISCONTINUED | OUTPATIENT
Start: 2023-09-12 | End: 2023-09-13

## 2023-09-12 RX ADMIN — ENOXAPARIN SODIUM 40 MILLIGRAM(S): 100 INJECTION SUBCUTANEOUS at 09:03

## 2023-09-12 RX ADMIN — Medication 2 PACKET(S): at 13:41

## 2023-09-12 RX ADMIN — Medication 2 PACKET(S): at 10:23

## 2023-09-12 RX ADMIN — SODIUM CHLORIDE 30 MILLILITER(S): 5 INJECTION, SOLUTION INTRAVENOUS at 13:42

## 2023-09-12 RX ADMIN — Medication 3 MILLIGRAM(S): at 21:35

## 2023-09-12 RX ADMIN — SODIUM CHLORIDE 30 MILLILITER(S): 5 INJECTION, SOLUTION INTRAVENOUS at 19:08

## 2023-09-12 RX ADMIN — Medication 25 MICROGRAM(S): at 06:26

## 2023-09-12 NOTE — OCCUPATIONAL THERAPY INITIAL EVALUATION ADULT - ADDITIONAL COMMENTS
Patient re-admitted from rehab. Prior to initial admission, Pt lived w/ her  apt (+elevator accessible) and was independent w/ ADLs and functional mobility/transfers - Patient used a w/c outdoors but was able to ambulate without an AD indoors.

## 2023-09-12 NOTE — PROGRESS NOTE ADULT - SUBJECTIVE AND OBJECTIVE BOX
** INCOMPLETE **    OVERNIGHT EVENTS:     SUBJECTIVE:    VITAL SIGNS:  Vital Signs Last 24 Hrs  T(C): 36.4 (12 Sep 2023 06:09), Max: 36.6 (11 Sep 2023 20:57)  T(F): 97.6 (12 Sep 2023 06:09), Max: 97.9 (11 Sep 2023 20:57)  HR: 77 (12 Sep 2023 06:09) (77 - 89)  BP: 139/79 (12 Sep 2023 06:09) (109/70 - 139/79)  BP(mean): 99 (11 Sep 2023 12:28) (99 - 99)  RR: 18 (12 Sep 2023 06:09) (18 - 18)  SpO2: 98% (12 Sep 2023 06:09) (97% - 98%)    Parameters below as of 12 Sep 2023 06:09  Patient On (Oxygen Delivery Method): room air        PHYSICAL EXAM:  General: NAD; speaking in full sentences  HEENT: NC/AT; PERRL; EOMI; MMM  Neck: supple; no JVD  Cardiac: RRR; +S1/S2  Pulm: CTA B/L; no W/R/R  GI: soft, NT/ND, +BS  Extremities: WWP; no edema, clubbing or cyanosis  Vasc: 2+ radial, DP pulses B/L  Neuro: AAOx3; no focal deficits    MEDICATIONS:  MEDICATIONS  (STANDING):  dextrose 5%. 1500 milliLiter(s) (1500 mL/Hr) IV Continuous <Continuous>  dextrose 5%. 250 milliLiter(s) (250 mL/Hr) IV Continuous <Continuous>  enoxaparin Injectable 40 milliGRAM(s) SubCutaneous every 24 hours  levothyroxine 25 MICROGram(s) Oral daily    MEDICATIONS  (PRN):  acetaminophen     Tablet .. 650 milliGRAM(s) Oral every 6 hours PRN Temp greater or equal to 38C (100.4F), Mild Pain (1 - 3)  melatonin 3 milliGRAM(s) Oral at bedtime PRN Insomnia      ALLERGIES:  Allergies    No Known Allergies    Intolerances        LABS:                        8.8    13.73 )-----------( 416      ( 12 Sep 2023 07:04 )             26.0     09-11    120<LL>  |  91<L>  |  11  ----------------------------<  165<H>  3.4<L>   |  23  |  0.60    Ca    8.4      11 Sep 2023 22:31  Phos  2.6     09-11  Mg     1.9     09-11    TPro  6.4  /  Alb  2.9<L>  /  TBili  0.5  /  DBili  x   /  AST  29  /  ALT  22  /  AlkPhos  109  09-11        RADIOLOGY & ADDITIONAL TESTS: Reviewed. ** INCOMPLETE **    OVERNIGHT EVENTS:     SUBJECTIVE: Patient is seen at bedside, resting comfortably.     VITAL SIGNS:  Vital Signs Last 24 Hrs  T(C): 36.4 (12 Sep 2023 06:09), Max: 36.6 (11 Sep 2023 20:57)  T(F): 97.6 (12 Sep 2023 06:09), Max: 97.9 (11 Sep 2023 20:57)  HR: 77 (12 Sep 2023 06:09) (77 - 89)  BP: 139/79 (12 Sep 2023 06:09) (109/70 - 139/79)  BP(mean): 99 (11 Sep 2023 12:28) (99 - 99)  RR: 18 (12 Sep 2023 06:09) (18 - 18)  SpO2: 98% (12 Sep 2023 06:09) (97% - 98%)    Parameters below as of 12 Sep 2023 06:09  Patient On (Oxygen Delivery Method): room air        PHYSICAL EXAM:  General: NAD; speaking in full sentences  HEENT: NC/AT; PERRL; EOMI; MMM  Neck: supple; no JVD  Cardiac: RRR; +S1/S2  Pulm: CTA B/L; no W/R/R  GI: soft, NT/ND, +BS  Extremities: WWP; no edema, clubbing or cyanosis  Vasc: 2+ radial, DP pulses B/L  Neuro: AAOx3; no focal deficits    MEDICATIONS:  MEDICATIONS  (STANDING):  dextrose 5%. 1500 milliLiter(s) (1500 mL/Hr) IV Continuous <Continuous>  dextrose 5%. 250 milliLiter(s) (250 mL/Hr) IV Continuous <Continuous>  enoxaparin Injectable 40 milliGRAM(s) SubCutaneous every 24 hours  levothyroxine 25 MICROGram(s) Oral daily    MEDICATIONS  (PRN):  acetaminophen     Tablet .. 650 milliGRAM(s) Oral every 6 hours PRN Temp greater or equal to 38C (100.4F), Mild Pain (1 - 3)  melatonin 3 milliGRAM(s) Oral at bedtime PRN Insomnia      ALLERGIES:  Allergies    No Known Allergies    Intolerances        LABS:                        8.8    13.73 )-----------( 416      ( 12 Sep 2023 07:04 )             26.0     09-11    120<LL>  |  91<L>  |  11  ----------------------------<  165<H>  3.4<L>   |  23  |  0.60    Ca    8.4      11 Sep 2023 22:31  Phos  2.6     09-11  Mg     1.9     09-11    TPro  6.4  /  Alb  2.9<L>  /  TBili  0.5  /  DBili  x   /  AST  29  /  ALT  22  /  AlkPhos  109  09-11        RADIOLOGY & ADDITIONAL TESTS: Reviewed. ** INCOMPLETE **    OVERNIGHT EVENTS:     SUBJECTIVE: Patient is seen at bedside, resting comfortably. She denies pain at her left hip and has no other complaints at this time. She is sleepy but alert and oriented x4. She took a 3mg melatonin last night around 10PM. Her fluid intake has been restricted since yesterday.  Patient denies headache, fever, chills, nausea, vomiting, chest pain, shortness of breath.     VITAL SIGNS:  Vital Signs Last 24 Hrs  T(C): 36.4 (12 Sep 2023 06:09), Max: 36.6 (11 Sep 2023 20:57)  T(F): 97.6 (12 Sep 2023 06:09), Max: 97.9 (11 Sep 2023 20:57)  HR: 77 (12 Sep 2023 06:09) (77 - 89)  BP: 139/79 (12 Sep 2023 06:09) (109/70 - 139/79)  BP(mean): 99 (11 Sep 2023 12:28) (99 - 99)  RR: 18 (12 Sep 2023 06:09) (18 - 18)  SpO2: 98% (12 Sep 2023 06:09) (97% - 98%)    Parameters below as of 12 Sep 2023 06:09  Patient On (Oxygen Delivery Method): room air        PHYSICAL EXAM:  General: NAD; speaking in full sentences  HEENT: NC/AT; PERRL; EOMI; MMM  Neck: supple; no JVD  Cardiac: RRR; +S1/S2  Pulm: CTA B/L; no W/R/R  GI: soft, NT/ND, +BS  Extremities: WWP; no edema, clubbing or cyanosis  Vasc: 2+ radial, DP pulses B/L  Skin: Left hip post op wound clean, dry, intact, packed and covered, nontender.  Neuro: AAOx3; no focal deficits    MEDICATIONS:  MEDICATIONS  (STANDING):  dextrose 5%. 1500 milliLiter(s) (1500 mL/Hr) IV Continuous <Continuous>  dextrose 5%. 250 milliLiter(s) (250 mL/Hr) IV Continuous <Continuous>  enoxaparin Injectable 40 milliGRAM(s) SubCutaneous every 24 hours  levothyroxine 25 MICROGram(s) Oral daily    MEDICATIONS  (PRN):  acetaminophen     Tablet .. 650 milliGRAM(s) Oral every 6 hours PRN Temp greater or equal to 38C (100.4F), Mild Pain (1 - 3)  melatonin 3 milliGRAM(s) Oral at bedtime PRN Insomnia      ALLERGIES:  Allergies    No Known Allergies    Intolerances        LABS:                        8.8    13.73 )-----------( 416      ( 12 Sep 2023 07:04 )             26.0     09-11    120<LL>  |  91<L>  |  11  ----------------------------<  165<H>  3.4<L>   |  23  |  0.60    Ca    8.4      11 Sep 2023 22:31  Phos  2.6     09-11  Mg     1.9     09-11    TPro  6.4  /  Alb  2.9<L>  /  TBili  0.5  /  DBili  x   /  AST  29  /  ALT  22  /  AlkPhos  109  09-11        RADIOLOGY & ADDITIONAL TESTS: Reviewed. ** INCOMPLETE **    OVERNIGHT EVENTS:     SUBJECTIVE: Patient is seen at bedside, resting comfortably. She denies pain at her left hip and has no other complaints at this time. She took a 3mg melatonin last night around 10PM. Her fluid intake has been restricted since yesterday. Patient states she ate an apple yesterday, ate hospital food but cannot remember what she ate. Nurse notes that patient ate a full meal. She is sleepy but alert and oriented x4. When prompted with questions about her social and family history, she gives clear and logical answers but frequently repeats herself.  Patient denies headache, dizziness, fever, chills, nausea, vomiting, chest pain, and shortness of breath.     VITAL SIGNS:  Vital Signs Last 24 Hrs  T(C): 36.4 (12 Sep 2023 06:09), Max: 36.6 (11 Sep 2023 20:57)  T(F): 97.6 (12 Sep 2023 06:09), Max: 97.9 (11 Sep 2023 20:57)  HR: 77 (12 Sep 2023 06:09) (77 - 89)  BP: 139/79 (12 Sep 2023 06:09) (109/70 - 139/79)  BP(mean): 99 (11 Sep 2023 12:28) (99 - 99)  RR: 18 (12 Sep 2023 06:09) (18 - 18)  SpO2: 98% (12 Sep 2023 06:09) (97% - 98%)    Parameters below as of 12 Sep 2023 06:09  Patient On (Oxygen Delivery Method): room air        PHYSICAL EXAM:  General: NAD; speaking in full sentences  HEENT: NC/AT; PERRL; EOMI; MMM  Neck: supple; no JVD  Cardiac: RRR; +S1/S2  Pulm: CTA B/L; no W/R/R  GI: soft, NT/ND, +BS  Extremities: WWP; no edema, clubbing or cyanosis  Vasc: 2+ radial, DP pulses B/L  Skin: Left hip post op wound clean, dry, intact, packed and covered, nontender.  Neuro: AAOx3; no focal deficits    MEDICATIONS:  MEDICATIONS  (STANDING):  dextrose 5%. 1500 milliLiter(s) (1500 mL/Hr) IV Continuous <Continuous>  dextrose 5%. 250 milliLiter(s) (250 mL/Hr) IV Continuous <Continuous>  enoxaparin Injectable 40 milliGRAM(s) SubCutaneous every 24 hours  levothyroxine 25 MICROGram(s) Oral daily    MEDICATIONS  (PRN):  acetaminophen     Tablet .. 650 milliGRAM(s) Oral every 6 hours PRN Temp greater or equal to 38C (100.4F), Mild Pain (1 - 3)  melatonin 3 milliGRAM(s) Oral at bedtime PRN Insomnia      ALLERGIES:  Allergies    No Known Allergies    Intolerances        LABS:                        8.8    13.73 )-----------( 416      ( 12 Sep 2023 07:04 )             26.0     09-11    120<LL>  |  91<L>  |  11  ----------------------------<  165<H>  3.4<L>   |  23  |  0.60    Ca    8.4      11 Sep 2023 22:31  Phos  2.6     09-11  Mg     1.9     09-11    TPro  6.4  /  Alb  2.9<L>  /  TBili  0.5  /  DBili  x   /  AST  29  /  ALT  22  /  AlkPhos  109  09-11        RADIOLOGY & ADDITIONAL TESTS: Reviewed. OVERNIGHT EVENTS:     SUBJECTIVE: Patient is seen at bedside, resting comfortably. She denies pain at her left hip and has no other complaints at this time. She took a 3mg melatonin last night around 10PM. Her fluid intake has been restricted since yesterday. Patient states she ate an apple yesterday, ate hospital food but cannot remember what she ate. Nurse notes that patient ate a full meal. She is sleepy but alert and oriented x4. When prompted with questions about her social and family history, she gives clear and logical answers but frequently repeats herself.  Patient denies headache, dizziness, fever, chills, nausea, vomiting, chest pain, and shortness of breath.     VITAL SIGNS:  Vital Signs Last 24 Hrs  T(C): 36.4 (12 Sep 2023 06:09), Max: 36.6 (11 Sep 2023 20:57)  T(F): 97.6 (12 Sep 2023 06:09), Max: 97.9 (11 Sep 2023 20:57)  HR: 77 (12 Sep 2023 06:09) (77 - 89)  BP: 139/79 (12 Sep 2023 06:09) (109/70 - 139/79)  BP(mean): 99 (11 Sep 2023 12:28) (99 - 99)  RR: 18 (12 Sep 2023 06:09) (18 - 18)  SpO2: 98% (12 Sep 2023 06:09) (97% - 98%)    Parameters below as of 12 Sep 2023 06:09  Patient On (Oxygen Delivery Method): room air        PHYSICAL EXAM:  General: NAD; speaking in full sentences  HEENT: NC/AT; PERRL; EOMI; MMM  Neck: supple; no JVD  Cardiac: RRR; +S1/S2  Pulm: CTA B/L; no W/R/R  GI: soft, NT/ND, +BS  Extremities: WWP; no edema, clubbing or cyanosis  Vasc: 2+ radial, DP pulses B/L  Skin: Left hip post op wound clean, dry, intact, packed and covered, nontender.  Neuro: AAOx3; no focal deficits    MEDICATIONS:  MEDICATIONS  (STANDING):  dextrose 5%. 1500 milliLiter(s) (1500 mL/Hr) IV Continuous <Continuous>  dextrose 5%. 250 milliLiter(s) (250 mL/Hr) IV Continuous <Continuous>  enoxaparin Injectable 40 milliGRAM(s) SubCutaneous every 24 hours  levothyroxine 25 MICROGram(s) Oral daily    MEDICATIONS  (PRN):  acetaminophen     Tablet .. 650 milliGRAM(s) Oral every 6 hours PRN Temp greater or equal to 38C (100.4F), Mild Pain (1 - 3)  melatonin 3 milliGRAM(s) Oral at bedtime PRN Insomnia      ALLERGIES:  Allergies    No Known Allergies    Intolerances        LABS:                        8.8    13.73 )-----------( 416      ( 12 Sep 2023 07:04 )             26.0     09-11    120<LL>  |  91<L>  |  11  ----------------------------<  165<H>  3.4<L>   |  23  |  0.60    Ca    8.4      11 Sep 2023 22:31  Phos  2.6     09-11  Mg     1.9     09-11    TPro  6.4  /  Alb  2.9<L>  /  TBili  0.5  /  DBili  x   /  AST  29  /  ALT  22  /  AlkPhos  109  09-11        RADIOLOGY & ADDITIONAL TESTS: Reviewed.

## 2023-09-12 NOTE — PROGRESS NOTE ADULT - ATTENDING COMMENTS
appears to have prolonged effect of DDAVP for overcorrection-- can give some more 3% NS as above  goal as above

## 2023-09-12 NOTE — OCCUPATIONAL THERAPY INITIAL EVALUATION ADULT - PERSONAL SAFETY AND JUDGMENT, REHAB EVAL
Decreased insight/judgement; requires cues to increase safety awareness w/ functional activities./impaired

## 2023-09-12 NOTE — OCCUPATIONAL THERAPY INITIAL EVALUATION ADULT - GENERAL OBSERVATIONS, REHAB EVAL
Pt's RN Nandini aware of intent to tx; cleared Pt. PT Orin present. Pt received in supine - +abductor pillow, heplock, L hip dressing intact.

## 2023-09-12 NOTE — PROGRESS NOTE ADULT - SUBJECTIVE AND OBJECTIVE BOX
Patient is a 83y Female seen and evaluated at bedside. No acute distress states that she feels well. Was given DDAVP and D5W for overcorrection, sNa 119 this AM.       Meds:    acetaminophen     Tablet .. 650 every 6 hours PRN  enoxaparin Injectable 40 every 24 hours  levothyroxine 25 daily  melatonin 3 at bedtime PRN  sodium chloride 3%. 500 <Continuous>      T(C): , Max: 36.6 (09-11-23 @ 20:57)  T(F): , Max: 97.9 (09-11-23 @ 20:57)  HR: 85 (09-12-23 @ 11:30)  BP: 142/85 (09-12-23 @ 11:30)  BP(mean): --  RR: 18 (09-12-23 @ 06:09)  SpO2: 98% (09-12-23 @ 06:09)  Wt(kg): --    09-11 @ 07:01  -  09-12 @ 07:00  --------------------------------------------------------  IN: 25 mL / OUT: 1200 mL / NET: -1175 mL    09-12 @ 07:01  -  09-12 @ 14:12  --------------------------------------------------------  IN: 0 mL / OUT: 900 mL / NET: -900 mL          Review of Systems:  ROS negative except as per HPI      PHYSICAL EXAM:  GENERAL: NAD  NECK: supple, No JVD  CHEST/LUNG: Clear to auscultation bilaterally  HEART: normal S1S2, RRR  ABDOMEN: Soft, Nontender, +BS, No flank tenderness bilateral  EXTREMITIES: No clubbing, cyanosis, 1+ pitting edema b/l   NEUROLOGY: AAO x3, no focal neurological deficit        LABS:                        8.8    13.73 )-----------( 416      ( 12 Sep 2023 07:04 )             26.0     09-12    119<LL>  |  87<L>  |  9   ----------------------------<  134<H>  3.5   |  24  |  0.43<L>    Ca    8.8      12 Sep 2023 10:20  Phos  2.0     09-12  Mg     1.7     09-12    TPro  6.1  /  Alb  2.6<L>  /  TBili  0.3  /  DBili  x   /  AST  24  /  ALT  22  /  AlkPhos  102  09-12    Uric Acid: 2.9 mg/dL [2.5 - 7.0] (09-11 @ 20:30)      Urinalysis Basic - ( 12 Sep 2023 10:20 )    Color: x / Appearance: x / SG: x / pH: x  Gluc: 134 mg/dL / Ketone: x  / Bili: x / Urobili: x   Blood: x / Protein: x / Nitrite: x   Leuk Esterase: x / RBC: x / WBC x   Sq Epi: x / Non Sq Epi: x / Bacteria: x      Sodium, Random Urine: 20 mmol/L (09-11 @ 23:43)  Osmolality, Random Urine: 262 mosm/kg (09-11 @ 23:43)  Sodium, Random Urine: 20 mmol/L (09-11 @ 18:05)  Osmolality, Random Urine: 119 mosm/kg (09-11 @ 18:05)  Sodium, Random Urine: 20 mmol/L (09-11 @ 04:06)  Creatinine, Random Urine: 9 mg/dL (09-11 @ 04:06)  Protein/Creatinine Ratio Calculation: Unable to calculate (09-11 @ 04:06)  Osmolality, Random Urine: 79 mosm/kg (09-11 @ 04:06)  Potassium, Random Urine: 8 mmol/L (09-11 @ 04:06)        RADIOLOGY & ADDITIONAL STUDIES:

## 2023-09-12 NOTE — PROGRESS NOTE ADULT - PROBLEM SELECTOR PLAN 2
Patient with Na 118 on arrival improved to 123 w/o intervention in ED, 130 this Am. Received D5W this AM and repeat Na was 131. On chart review was hyponatremic to low 130/high 120's on last admission however not this low. Was previously dx with hypovolemic hypoNa. Of note, pt has also been on Sertraline for many years.   Today, the pt is urinating frequently and large amounts.   Serum osmolality 278, urine osmolality 79. Urine Na 20, Spe Grav <=1.005.  Plan:  - Renal consulted, will give 1mcg of DDAVP now and 1.5L of D5W followed by repeat BMP with the goal of Na 124 by this PM.   - F/u BMP at 4pm  - non-diabetic A1c 6.1 Patient with Na 118 on arrival improved to 123 w/o intervention in ED. Na 130-131 yesterday. Received 2 doses desmopressin 1mcg yesterday. Today, Na down to 120  **Received D5W this AM and repeat Na was 131. On chart review was hyponatremic to low 130/high 120's on last admission. Was previously dx with hypovolemic hypoNa. Of note, pt has also been on Sertraline for many years.     Serum osmolality 278, urine osmolality 79. Urine Na 20, Spe Grav <=1.005.  Plan:  - Renal consulted, will give 1mcg of DDAVP now and 1.5L of D5W followed by repeat BMP with the goal of Na 124 by this PM.   - F/u BMP at 4pm  - non-diabetic A1c 6.1 Patient with Na 118 on arrival improved to 123 w/o intervention in ED. Na 130-131 yesterday. Received 2 doses desmopressin 1mcg and 1.5L D5W yesterday. Today Na down to 120, stable overnight.  On chart review was hyponatremic to low 130/high 120's on last admission. Was previously dx with hypovolemic hypoNa. Of note, pt has also been on Sertraline for many years.   Phosphate low, given PHOS-NaK powder.   Serum osmolality 278, urine osmolality 262 (up from 79). Urine Na 20.    Plan:  - F/u BMP   - F/u AM cortisol  - F/u renal recommendations  - non-diabetic A1c 6.1

## 2023-09-12 NOTE — PROGRESS NOTE ADULT - PROBLEM SELECTOR PLAN 4
F: 500 D5W  E: Replete as necessary K>4 Mg>2  N: DASH diet   DVT Prophylaxis: Lovenox 40mg daily   GI prophylaxis: None   CODE STATUS: FULL

## 2023-09-12 NOTE — PROGRESS NOTE ADULT - ATTENDING COMMENTS
84yo female with PMHx of hypothyroidism and anxiety who presents to the ED 8 days after right hemiarthroplasty with skin changes around surgical site with concern for infection.    #Left hip zion surgical site skin changes   #Hyponatremia 2/2 hypovolemia vs holding SSRI  #Hypothyroidism     -Infection unlikely. WBC trending down off abx. No fever, tachycardia, hypotension. As per ortho, likely skin irritation from Aquacel dressing. Recommended daily dressing changes w/ xeroform, 4x4, and paper tape.  -Presented with Na 118 s/p 500cc bolus in ED, Na overcorrected to 123>130>131. Nephro was consulted and started on D5. S/p 2xDDAVP. Na downtrended to 124>122>120. Now on 3%Nacl   -Check BMP Q4. Next at 6pm. Goal 129-131 by 11pm  -Dispo: [facility]  -DVT ppx: Lovenox 82yo female with PMHx of hypothyroidism and anxiety who presents to the ED 8 days after right hemiarthroplasty with skin changes around surgical site with concern for infection.    #Left hip zion surgical site skin changes   #Hyponatremia 2/2 hypovolemia vs holding SSRI  #Hypothyroidism     -Infection unlikely. WBC trending down off abx. No fever, tachycardia, hypotension. As per ortho, likely skin irritation from Aquacel dressing. Recommended daily dressing changes w/ xeroform, 4x4, and paper tape.  -Presented with Na 118 s/p 500cc bolus in ED, Na overcorrected to 123>130>131. Nephro was consulted and started on D5. S/p 2xDDAVP. Na downtrended to 124>122>120. Now on 3%Nacl   -Check BMP Q4. Next at 6pm. Goal 129-131 by 11pm  -Q4hrs Urine Na, Urine Osm. strict IOs.  -Dispo: [facility]  -DVT ppx: Lovenox

## 2023-09-12 NOTE — PROGRESS NOTE ADULT - ASSESSMENT
The patient is a 82yo female with PMHx of hypothyroidism, osteoporosis and anxiety who presents to the ED 8 days after left hemiarthroplasty with concern of postop infection. The pt was also found to be hyponatremic from baseline. The pt was admitted for management of possible infection and hyponatremia

## 2023-09-12 NOTE — OCCUPATIONAL THERAPY INITIAL EVALUATION ADULT - PERTINENT HX OF CURRENT PROBLEM, REHAB EVAL
84yo female with PMHx of hypothyroidism, osteoporosis and anxiety who presents to the ED 8 days after left hemiarthroplasty with concern of postop infection. The pt was also found to be hyponatremic from baseline. The pt was admitted for management of possible infection and hyponatremia.

## 2023-09-12 NOTE — OCCUPATIONAL THERAPY INITIAL EVALUATION ADULT - MD ORDER
ED 8 days after Left hemiarthroplasty with concern of postop infection  +Surgical site infection  Hyponatremia

## 2023-09-12 NOTE — PROGRESS NOTE ADULT - PROBLEM SELECTOR PLAN 1
Patient 8 days post op coming in with +WBC, +ESR, +CRP, and initial concern for surgical site infection given pain to left hip and drainage that the family noticed. No drainage noticed during our evaluation. Orthopedics consulted in ED and states no need for acute intervention. No sx of cough, diarrhea, n/v, URI sx.   S/p vanc / zosyn 3.375 in ED  Today, pt not meeting SIRS criteria, site on exam is c/d/i without erythema or signs of infection. Leukocytosis is improving.  Orthopedic surgery following   Plan:  - vanc/zosyn discontinued as infection unlikely.  - Orthopedic surgery recommends daily dressing changes w/ xeroform, 4x4, and paper tape  - f/u blood and wound culture   - trend CBC daily   - Monitor vitals daily or more frequent if needed Patient 8 days post op coming in with +WBC, +ESR, +CRP, and initial concern for surgical site infection given pain to left hip and drainage that the family noticed. No drainage noticed during our evaluation. Orthopedics consulted in ED and states no need for acute intervention. No sx of cough, diarrhea, n/v, URI sx.   S/p vanc / zosyn 3.375 in ED  Today, pt not meeting SIRS criteria, site on exam is c/d/i without erythema or signs of infection. Leukocytosis is improving. Blood culture negative, surgical swab culture shows few gram positive cocci and few WBCs, likely normal skin gracy.  Orthopedic surgery following   Plan:  - vanc/zosyn discontinued as infection unlikely.  - Orthopedic surgery recommends daily dressing changes w/ xeroform, 4x4, and paper tape  - trend CBC daily   - Monitor vitals daily or more frequent if needed Patient 8 days post op coming in with +WBC, +ESR, +CRP, and initial concern for surgical site infection given pain to left hip and drainage that the family noticed. No drainage noticed during our evaluation. Orthopedics consulted in ED and states no need for acute intervention. No sx of cough, diarrhea, n/v, URI sx.   S/p vanc / zosyn 3.375 in ED  Today, pt not meeting SIRS criteria, site on exam is c/d/i without erythema or signs of infection. Leukocytosis is improving. Blood culture negative, surgical swab stain shows few gram positive cocci and few WBCs, likely normal skin gracy.   WBC and CRP downtrending.  Orthopedic surgery following.  Plan:  - vanc/zosyn discontinued as infection unlikely.  - Orthopedic surgery recommends daily dressing changes w/ xeroform, 4x4, and paper tape  - f/u wound culture  - trend CBC daily   - Monitor vitals daily or more frequent if needed Patient 8 days post op coming in with +WBC, +ESR, +CRP, and initial concern for surgical site infection given pain to left hip and drainage that the family noticed. No drainage noticed during our evaluation. Orthopedics consulted in ED and states no need for acute intervention. No sx of cough, diarrhea, n/v, URI sx.   S/p vanc / zosyn 3.375 in ED  Today, pt not meeting SIRS criteria, site on exam is c/d/i without erythema or signs of infection. Leukocytosis is improving. Blood culture negative, surgical swab culture showed rare E.coli and numerous Staph Epidermidis. Gram stain shows few gram positive cocci and few WBCs.  WBC and CRP downtrending.  Orthopedic surgery following.  Plan:  - vanc/zosyn discontinued as infection seems unlikely.  - Orthopedic surgery recommends daily dressing changes w/ xeroform, 4x4, and paper tape  - f/u final wound culture results  - trend CBC daily   - Monitor vitals daily or more frequent if needed

## 2023-09-12 NOTE — PROGRESS NOTE ADULT - ASSESSMENT
85Y F with Chronic asymptomatic hyponatremia given 1mcg DDAVP and 2L D5W for therapeutic relowering with snA 119 this AM       Chronic asymptomatic hyponatremia   s/p DDAVP 1mcg 9/11 and 2L of D5W       Plan:  Give 30cc/her of hypertonic for 3 hours and repeat snA at 4PM   Goal sNa by 11PM 9/12 129-131  Once DDAVP wears off patient should begin to correct on her own   Q4hrs BMP, Urine Na, Urine Osm   Encourage PO intake

## 2023-09-13 ENCOUNTER — TRANSCRIPTION ENCOUNTER (OUTPATIENT)
Age: 83
End: 2023-09-13

## 2023-09-13 VITALS
TEMPERATURE: 97 F | RESPIRATION RATE: 17 BRPM | SYSTOLIC BLOOD PRESSURE: 130 MMHG | HEART RATE: 87 BPM | OXYGEN SATURATION: 99 % | DIASTOLIC BLOOD PRESSURE: 71 MMHG

## 2023-09-13 LAB
-  AMPICILLIN/SULBACTAM: SIGNIFICANT CHANGE UP
-  AMPICILLIN: SIGNIFICANT CHANGE UP
-  CEFAZOLIN: SIGNIFICANT CHANGE UP
-  CEFTRIAXONE: SIGNIFICANT CHANGE UP
-  CIPROFLOXACIN: SIGNIFICANT CHANGE UP
-  CLINDAMYCIN: SIGNIFICANT CHANGE UP
-  ERTAPENEM: SIGNIFICANT CHANGE UP
-  ERYTHROMYCIN: SIGNIFICANT CHANGE UP
-  GENTAMICIN: SIGNIFICANT CHANGE UP
-  LINEZOLID: SIGNIFICANT CHANGE UP
-  OXACILLIN: SIGNIFICANT CHANGE UP
-  PIPERACILLIN/TAZOBACTAM: SIGNIFICANT CHANGE UP
-  RIFAMPIN: SIGNIFICANT CHANGE UP
-  TOBRAMYCIN: SIGNIFICANT CHANGE UP
-  TRIMETHOPRIM/SULFAMETHOXAZOLE: SIGNIFICANT CHANGE UP
-  TRIMETHOPRIM/SULFAMETHOXAZOLE: SIGNIFICANT CHANGE UP
-  VANCOMYCIN: SIGNIFICANT CHANGE UP
ANION GAP SERPL CALC-SCNC: 3 MMOL/L — LOW (ref 5–17)
ANION GAP SERPL CALC-SCNC: 7 MMOL/L — SIGNIFICANT CHANGE UP (ref 5–17)
ANION GAP SERPL CALC-SCNC: 7 MMOL/L — SIGNIFICANT CHANGE UP (ref 5–17)
BUN SERPL-MCNC: 10 MG/DL — SIGNIFICANT CHANGE UP (ref 7–23)
BUN SERPL-MCNC: 8 MG/DL — SIGNIFICANT CHANGE UP (ref 7–23)
CALCIUM SERPL-MCNC: 9.2 MG/DL — SIGNIFICANT CHANGE UP (ref 8.4–10.5)
CALCIUM SERPL-MCNC: 9.4 MG/DL — SIGNIFICANT CHANGE UP (ref 8.4–10.5)
CHLORIDE SERPL-SCNC: 101 MMOL/L — SIGNIFICANT CHANGE UP (ref 96–108)
CHLORIDE SERPL-SCNC: 102 MMOL/L — SIGNIFICANT CHANGE UP (ref 96–108)
CHLORIDE SERPL-SCNC: 95 MMOL/L — LOW (ref 96–108)
CO2 SERPL-SCNC: 24 MMOL/L — SIGNIFICANT CHANGE UP (ref 22–31)
CO2 SERPL-SCNC: 24 MMOL/L — SIGNIFICANT CHANGE UP (ref 22–31)
CREAT SERPL-MCNC: 0.47 MG/DL — LOW (ref 0.5–1.3)
CREAT SERPL-MCNC: 0.52 MG/DL — SIGNIFICANT CHANGE UP (ref 0.5–1.3)
EGFR: 93 ML/MIN/1.73M2 — SIGNIFICANT CHANGE UP
EGFR: 95 ML/MIN/1.73M2 — SIGNIFICANT CHANGE UP
GLUCOSE SERPL-MCNC: 132 MG/DL — HIGH (ref 70–99)
GLUCOSE SERPL-MCNC: 193 MG/DL — HIGH (ref 70–99)
HCT VFR BLD CALC: 25.8 % — LOW (ref 34.5–45)
HGB BLD-MCNC: 8.7 G/DL — LOW (ref 11.5–15.5)
MAGNESIUM SERPL-MCNC: 1.9 MG/DL — SIGNIFICANT CHANGE UP (ref 1.6–2.6)
MCHC RBC-ENTMCNC: 28.8 PG — SIGNIFICANT CHANGE UP (ref 27–34)
MCHC RBC-ENTMCNC: 33.7 GM/DL — SIGNIFICANT CHANGE UP (ref 32–36)
MCV RBC AUTO: 85.4 FL — SIGNIFICANT CHANGE UP (ref 80–100)
METHOD TYPE: SIGNIFICANT CHANGE UP
METHOD TYPE: SIGNIFICANT CHANGE UP
NRBC # BLD: 0 /100 WBCS — SIGNIFICANT CHANGE UP (ref 0–0)
OSMOLALITY UR: 107 MOSM/KG — LOW (ref 300–900)
OSMOLALITY UR: 297 MOSM/KG — LOW (ref 300–900)
PHOSPHATE SERPL-MCNC: 3 MG/DL — SIGNIFICANT CHANGE UP (ref 2.5–4.5)
PLATELET # BLD AUTO: 411 K/UL — HIGH (ref 150–400)
POTASSIUM SERPL-MCNC: 3.9 MMOL/L — SIGNIFICANT CHANGE UP (ref 3.5–5.3)
POTASSIUM SERPL-MCNC: 4.1 MMOL/L — SIGNIFICANT CHANGE UP (ref 3.5–5.3)
POTASSIUM SERPL-SCNC: 3.9 MMOL/L — SIGNIFICANT CHANGE UP (ref 3.5–5.3)
POTASSIUM SERPL-SCNC: 4.1 MMOL/L — SIGNIFICANT CHANGE UP (ref 3.5–5.3)
RBC # BLD: 3.02 M/UL — LOW (ref 3.8–5.2)
RBC # FLD: 13.4 % — SIGNIFICANT CHANGE UP (ref 10.3–14.5)
SODIUM SERPL-SCNC: 124 MMOL/L — LOW (ref 135–145)
SODIUM SERPL-SCNC: 128 MMOL/L — LOW (ref 135–145)
SODIUM SERPL-SCNC: 133 MMOL/L — LOW (ref 135–145)
SODIUM UR-SCNC: 22 MMOL/L — SIGNIFICANT CHANGE UP
SODIUM UR-SCNC: 30 MMOL/L — SIGNIFICANT CHANGE UP
WBC # BLD: 7.78 K/UL — SIGNIFICANT CHANGE UP (ref 3.8–10.5)
WBC # FLD AUTO: 7.78 K/UL — SIGNIFICANT CHANGE UP (ref 3.8–10.5)

## 2023-09-13 PROCEDURE — 99239 HOSP IP/OBS DSCHRG MGMT >30: CPT | Mod: GC

## 2023-09-13 RX ORDER — LANOLIN ALCOHOL/MO/W.PET/CERES
1 CREAM (GRAM) TOPICAL
Qty: 0 | Refills: 0 | DISCHARGE
Start: 2023-09-13

## 2023-09-13 RX ORDER — SODIUM CHLORIDE 5 G/100ML
500 INJECTION, SOLUTION INTRAVENOUS
Refills: 0 | Status: DISCONTINUED | OUTPATIENT
Start: 2023-09-13 | End: 2023-09-13

## 2023-09-13 RX ORDER — ACETAMINOPHEN 500 MG
2 TABLET ORAL
Qty: 0 | Refills: 0 | DISCHARGE
Start: 2023-09-13

## 2023-09-13 RX ORDER — ENOXAPARIN SODIUM 100 MG/ML
40 INJECTION SUBCUTANEOUS
Qty: 0 | Refills: 0 | DISCHARGE
Start: 2023-09-13

## 2023-09-13 RX ADMIN — ENOXAPARIN SODIUM 40 MILLIGRAM(S): 100 INJECTION SUBCUTANEOUS at 08:34

## 2023-09-13 RX ADMIN — SODIUM CHLORIDE 30 MILLILITER(S): 5 INJECTION, SOLUTION INTRAVENOUS at 03:07

## 2023-09-13 RX ADMIN — Medication 1 MILLIGRAM(S): at 00:59

## 2023-09-13 RX ADMIN — Medication 1 TABLET(S): at 13:06

## 2023-09-13 RX ADMIN — Medication 25 MICROGRAM(S): at 06:56

## 2023-09-13 NOTE — PROGRESS NOTE ADULT - PROBLEM SELECTOR PLAN 2
Patient with Na 118 on arrival improved to 123 w/o intervention in ED. Na 130-131 yesterday. Received 2 doses desmopressin 1mcg and 1.5L D5W yesterday. Today Na down to 120, stable overnight.  On chart review was hyponatremic to low 130/high 120's on last admission. Was previously dx with hypovolemic hypoNa. Of note, pt has also been on Sertraline for many years.   Phosphate low, given PHOS-NaK powder.   Serum osmolality 278, urine osmolality 262 (up from 79). Urine Na 20.    Plan:  - F/u BMP   - F/u AM cortisol  - F/u renal recommendations  - non-diabetic A1c 6.1

## 2023-09-13 NOTE — DISCHARGE NOTE NURSING/CASE MANAGEMENT/SOCIAL WORK - NSDCPEFALRISK_GEN_ALL_CORE
For information on Fall & Injury Prevention, visit: [email or web address] OR  [email or web address] OR  https://www.cdc.gov/steadi/patient.html

## 2023-09-13 NOTE — PROGRESS NOTE ADULT - PROBLEM SELECTOR PLAN 1
Patient 8 days post op coming in with +WBC, +ESR, +CRP, and initial concern for surgical site infection given pain to left hip and drainage that the family noticed. No drainage noticed during our evaluation. Orthopedics consulted in ED and states no need for acute intervention. No sx of cough, diarrhea, n/v, URI sx.   S/p vanc / zosyn 3.375 in ED  Today, pt not meeting SIRS criteria, site on exam is c/d/i without erythema or signs of infection. Leukocytosis is improving. Blood culture negative, surgical swab culture showed rare E.coli and numerous Staph Epidermidis. Gram stain shows few gram positive cocci and few WBCs.  WBC and CRP downtrending.  Orthopedic surgery following.  Plan:  - vanc/zosyn discontinued as infection seems unlikely.  - Orthopedic surgery recommends daily dressing changes w/ xeroform, 4x4, and paper tape  - f/u final wound culture results  - trend CBC daily   - Monitor vitals daily or more frequent if needed Patient 8 days post op coming in with +WBC, +ESR, +CRP, and initial concern for surgical site infection given pain to left hip and drainage that the family noticed. No drainage noticed during our evaluation. Orthopedics consulted in ED and states no need for acute intervention. No sx of cough, diarrhea, n/v, URI sx.   S/p vanc / zosyn 3.375 in ED  Surgical site on exam is c/d/i without erythema or signs of infection. Leukocytosis is improving. Blood culture negative, surgical swab culture showed rare E.coli and numerous Staph Epidermidis. Gram stain shows few gram positive cocci and few WBCs.   WBC count today WNL 7.78, CRP downtrending.  Orthopedic surgery following.  Plan:  - vanc/zosyn discontinued as infection seems unlikely.  - Orthopedic surgery recommends daily dressing changes w/ xeroform, 4x4, and paper tape  - f/u final wound culture results  - trend CBC daily   - Monitor vitals daily or more frequent if needed

## 2023-09-13 NOTE — PROGRESS NOTE ADULT - SUBJECTIVE AND OBJECTIVE BOX
** INCOMPLETE **    OVERNIGHT EVENTS:     SUBJECTIVE:    VITAL SIGNS:  Vital Signs Last 24 Hrs  T(C): 36.9 (13 Sep 2023 06:39), Max: 37.3 (12 Sep 2023 21:20)  T(F): 98.4 (13 Sep 2023 06:39), Max: 99.1 (12 Sep 2023 21:20)  HR: 77 (13 Sep 2023 06:39) (77 - 98)  BP: 116/72 (13 Sep 2023 06:39) (112/71 - 142/85)  BP(mean): --  RR: 18 (13 Sep 2023 06:39) (18 - 18)  SpO2: 95% (13 Sep 2023 06:39) (95% - 97%)    Parameters below as of 13 Sep 2023 06:39  Patient On (Oxygen Delivery Method): room air        PHYSICAL EXAM:  General: NAD; speaking in full sentences  HEENT: NC/AT; PERRL; EOMI; MMM  Neck: supple; no JVD  Cardiac: RRR; +S1/S2  Pulm: CTA B/L; no W/R/R  GI: soft, NT/ND, +BS  Extremities: WWP; no edema, clubbing or cyanosis  Vasc: 2+ radial, DP pulses B/L  Neuro: AAOx3; no focal deficits    MEDICATIONS:  MEDICATIONS  (STANDING):  enoxaparin Injectable 40 milliGRAM(s) SubCutaneous every 24 hours  levothyroxine 25 MICROGram(s) Oral daily  sodium chloride 3%. 500 milliLiter(s) (30 mL/Hr) IV Continuous <Continuous>    MEDICATIONS  (PRN):  acetaminophen     Tablet .. 650 milliGRAM(s) Oral every 6 hours PRN Temp greater or equal to 38C (100.4F), Mild Pain (1 - 3)  melatonin 3 milliGRAM(s) Oral at bedtime PRN Insomnia      ALLERGIES:  Allergies    No Known Allergies    Intolerances        LABS:                        8.7    7.78  )-----------( 411      ( 13 Sep 2023 07:03 )             25.8     09-13    128<L>  |  101  |  10  ----------------------------<  132<H>  3.9   |  24  |  0.47<L>    Ca    9.2      13 Sep 2023 07:03  Phos  3.0     09-13  Mg     1.9     09-13    TPro  6.1  /  Alb  2.6<L>  /  TBili  0.3  /  DBili  x   /  AST  24  /  ALT  22  /  AlkPhos  102  09-12        RADIOLOGY & ADDITIONAL TESTS: Reviewed. ** INCOMPLETE **    OVERNIGHT EVENTS: NAEO    SUBJECTIVE: Patient seen at bedside, sitting up slightly and eating lunch with her . She is more alert today, not sleepy as seen on previous days. Patient has good appetite, denies any pain.  Patient denies CP, SOB, fever,     VITAL SIGNS:  Vital Signs Last 24 Hrs  T(C): 36.9 (13 Sep 2023 06:39), Max: 37.3 (12 Sep 2023 21:20)  T(F): 98.4 (13 Sep 2023 06:39), Max: 99.1 (12 Sep 2023 21:20)  HR: 77 (13 Sep 2023 06:39) (77 - 98)  BP: 116/72 (13 Sep 2023 06:39) (112/71 - 142/85)  BP(mean): --  RR: 18 (13 Sep 2023 06:39) (18 - 18)  SpO2: 95% (13 Sep 2023 06:39) (95% - 97%)    Parameters below as of 13 Sep 2023 06:39  Patient On (Oxygen Delivery Method): room air        PHYSICAL EXAM:  General: NAD; speaking in full sentences  HEENT: NC/AT; PERRL; EOMI; MMM  Neck: supple; no JVD  Cardiac: RRR; +S1/S2  Pulm: CTA B/L; no W/R/R  GI: soft, NT/ND, +BS  Extremities: WWP; 1+ pitting edema of b/l LE, clubbing or cyanosis  Vasc: 2+ radial, DP pulses B/L  Neuro: AAOx3; no focal deficits    MEDICATIONS:  MEDICATIONS  (STANDING):  enoxaparin Injectable 40 milliGRAM(s) SubCutaneous every 24 hours  levothyroxine 25 MICROGram(s) Oral daily  sodium chloride 3%. 500 milliLiter(s) (30 mL/Hr) IV Continuous <Continuous>    MEDICATIONS  (PRN):  acetaminophen     Tablet .. 650 milliGRAM(s) Oral every 6 hours PRN Temp greater or equal to 38C (100.4F), Mild Pain (1 - 3)  melatonin 3 milliGRAM(s) Oral at bedtime PRN Insomnia      ALLERGIES:  Allergies    No Known Allergies    Intolerances        LABS:                        8.7    7.78  )-----------( 411      ( 13 Sep 2023 07:03 )             25.8     09-13    128<L>  |  101  |  10  ----------------------------<  132<H>  3.9   |  24  |  0.47<L>    Ca    9.2      13 Sep 2023 07:03  Phos  3.0     09-13  Mg     1.9     09-13    TPro  6.1  /  Alb  2.6<L>  /  TBili  0.3  /  DBili  x   /  AST  24  /  ALT  22  /  AlkPhos  102  09-12        RADIOLOGY & ADDITIONAL TESTS: Reviewed. ** INCOMPLETE **    OVERNIGHT EVENTS: NAEO    SUBJECTIVE: Patient seen at bedside, sitting up slightly and eating lunch with her . She is more alert today, not sleepy as seen on previous days. Patient has good appetite, denies any pain or any new complaints. She slept well last night after receiving melatonin.  Patient denies headache, dizziness, CP, SOB, fever, nausea, vomiting, chills, abdominal pain, numbness or tingling.    VITAL SIGNS:  Vital Signs Last 24 Hrs  T(C): 36.9 (13 Sep 2023 06:39), Max: 37.3 (12 Sep 2023 21:20)  T(F): 98.4 (13 Sep 2023 06:39), Max: 99.1 (12 Sep 2023 21:20)  HR: 77 (13 Sep 2023 06:39) (77 - 98)  BP: 116/72 (13 Sep 2023 06:39) (112/71 - 142/85)  BP(mean): --  RR: 18 (13 Sep 2023 06:39) (18 - 18)  SpO2: 95% (13 Sep 2023 06:39) (95% - 97%)    Parameters below as of 13 Sep 2023 06:39  Patient On (Oxygen Delivery Method): room air        PHYSICAL EXAM:  General: NAD; speaking in full sentences  HEENT: NC/AT; PERRL; EOMI; MMM  Neck: supple; no JVD  Cardiac: RRR; +S1/S2  Pulm: CTA B/L; no W/R/R  GI: soft, NT/ND, +BS  Extremities: WWP; 1+ pitting edema of b/l LE, clubbing or cyanosis  Vasc: 2+ radial, DP pulses B/L  Neuro: AAOx3; no focal deficits    MEDICATIONS:  MEDICATIONS  (STANDING):  enoxaparin Injectable 40 milliGRAM(s) SubCutaneous every 24 hours  levothyroxine 25 MICROGram(s) Oral daily  sodium chloride 3%. 500 milliLiter(s) (30 mL/Hr) IV Continuous <Continuous>    MEDICATIONS  (PRN):  acetaminophen     Tablet .. 650 milliGRAM(s) Oral every 6 hours PRN Temp greater or equal to 38C (100.4F), Mild Pain (1 - 3)  melatonin 3 milliGRAM(s) Oral at bedtime PRN Insomnia      ALLERGIES:  Allergies    No Known Allergies    Intolerances        LABS:                        8.7    7.78  )-----------( 411      ( 13 Sep 2023 07:03 )             25.8     09-13    128<L>  |  101  |  10  ----------------------------<  132<H>  3.9   |  24  |  0.47<L>    Ca    9.2      13 Sep 2023 07:03  Phos  3.0     09-13  Mg     1.9     09-13    TPro  6.1  /  Alb  2.6<L>  /  TBili  0.3  /  DBili  x   /  AST  24  /  ALT  22  /  AlkPhos  102  09-12        RADIOLOGY & ADDITIONAL TESTS: Reviewed.

## 2023-09-13 NOTE — PROGRESS NOTE ADULT - SUBJECTIVE AND OBJECTIVE BOX
Physical Medicine and Rehabilitation Progress Note :       Patient is a 83y old  Female who presents with a chief complaint of Hyponatremia and Concern for infection after orthopedic surgery (13 Sep 2023 11:53)      HPI:  HPI:  The patient is a 82yo female with PMHx of hypothyroidism and anxiety who presents to the ED 8 days after left hemiarthroplasty with concern of postop infection. Per family at bedside, pt has been complaining of pain and they noticed drainage to the incision site the last few days. No fevers or chills/malaise. Patient was d/c to [facility] rehab on last admission.     In ED   VSS, afebrile  Labs note able for leukocytosis 14, ESR 62, CRP 42, Na 118-->123. lactate wnl  Patient received vanc, zosyn, melatonin in ED.  Ortho consulted and will follow   (11 Sep 2023 02:44)                            8.7    7.78  )-----------( 411      ( 13 Sep 2023 07:03 )             25.8       09-13    x   |  102  |  8   ----------------------------<  193<H>  4.1   |  24  |  0.52    Ca    9.4      13 Sep 2023 12:33  Phos  3.0     09-13  Mg     1.9     09-13    TPro  6.1  /  Alb  2.6<L>  /  TBili  0.3  /  DBili  x   /  AST  24  /  ALT  22  /  AlkPhos  102  09-12    Vital Signs Last 24 Hrs  T(C): 36.3 (13 Sep 2023 12:11), Max: 37.3 (12 Sep 2023 21:20)  T(F): 97.4 (13 Sep 2023 12:11), Max: 99.1 (12 Sep 2023 21:20)  HR: 87 (13 Sep 2023 12:11) (77 - 98)  BP: 130/71 (13 Sep 2023 12:11) (112/71 - 130/71)  BP(mean): --  RR: 17 (13 Sep 2023 12:11) (17 - 18)  SpO2: 99% (13 Sep 2023 12:11) (95% - 99%)    Parameters below as of 13 Sep 2023 12:11  Patient On (Oxygen Delivery Method): room air        MEDICATIONS  (STANDING):  amoxicillin  875 milliGRAM(s)/clavulanate 1 Tablet(s) Oral once  enoxaparin Injectable 40 milliGRAM(s) SubCutaneous every 24 hours  levothyroxine 25 MICROGram(s) Oral daily    MEDICATIONS  (PRN):  acetaminophen     Tablet .. 650 milliGRAM(s) Oral every 6 hours PRN Temp greater or equal to 38C (100.4F), Mild Pain (1 - 3)  melatonin 3 milliGRAM(s) Oral at bedtime PRN Insomnia         9/12/2023 Functional Status Assessment :       Previous Level of Function:     · Additional Comments	Patient re-admitted from rehab. Prior to initial admission, Pt lived w/ her  apt (+elevator accessible) and was independent w/ ADLs and functional mobility/transfers - Patient used a w/c outdoors but was able to ambulate without an AD indoors.      Cognitive Status Examination:   · Level of Consciousness	lethargic/somnolent  · Orientation	oriented to person, place, time and situation  · Follow Commands/Answers Questions	100% of the time  · Personal Safety and Judgment	impaired; Decreased insight/judgement; requires cues to increase safety awareness w/ functional activities.  · Short Term Memory	intact  · Long Term Memory	intact    Range of Motion Exam:   · Range of Motion Examination, Upper Extremity	bilateral UE Passive ROM was WFL  (within functional limits); bilateral UE Active ROM was WFL  (within functional limits)  · Range of Motion Examination, Lower Extremity	RLE AROM, PROM at WFL, WNL; L hip AROM, PROM limited; L ankle AROM at WFL, WNL    Manual Muscle Testing:   · Manual Muscle Testing Results	BUEs 4/5; RLE 4/5; L hip and knee 2-/5; ankle 3+/5    Muscle Tone Assessment:   · Muscle Tone Assessment	normal    Bed Mobility: Rolling/Turning:     · Level of [place]	moderate assist (50% patients effort)  · Physical Assist/Nonphysical Assist	verbal cues; 2 person assist    Bed Mobility: Scooting/Bridging:     · Level of [place]	moderate assist (50% patients effort)  · Physical Assist/Nonphysical Assist	2 person assist; verbal cues    Bed Mobility: Supine to Sit:     · Level of [place]	minimum assist (75% patients effort)  · Physical Assist/Nonphysical Assist	2 person assist; verbal cues    Bed Mobility Analysis:     · Bed Mobility Limitations	decreased ability to use legs for bridging/pushing  · Impairments Contributing to Impaired Bed Mobility	decreased strength; impaired postural control; impaired coordination; decreased flexibility    Transfer: Bed to Chair:   Bed to Chair Transfer:    Transfer Skill: Bed to Chair   · Level of [place]	maximum assist (25% patients effort)  · Physical Assist/Nonphysical Assist	2 person assist; verbal cues  · Assistive Device	rolling walker    Bed to Chair Safety Analysis:     · Impairments Contributing to Impaired Transfers	impaired coordination; decreased flexibility; decreased strength; decreased ROM; impaired postural control; impaired balance  · Transfer Safety Concerns Noted	decreased sequencing ability; decreased safety awareness    Transfer: Sit to Stand:     · Level of [place]	moderate assist (50% patients effort)  · Physical Assist/Nonphysical Assist	2 person assist; verbal cues  · Assistive Device	rolling walker    Transfer: Stand to Sit:     · Level of [place]	moderate assist (50% patients effort)  · Physical Assist/Nonphysical Assist	2 person assist; verbal cues  · Assistive Device	rolling walker    Sit/Stand Transfer Safety Analysis:     · Transfer Safety Concerns Noted	decreased sequencing ability; decreased safety awareness  · Impairments Contributing to Impaired Transfers	impaired balance; impaired coordination; impaired postural control; decreased flexibility; decreased strength; decreased ROM    Balance Skills Assessment:     · Sitting Balance: Static	good balance  · Sitting Balance: Dynamic	good minus  · Sit-to-Stand Balance	poor plus  · Standing Balance: Static	poor plus  · Standing Balance: Dynamic	poor balance  · Systems Impairment Contributing to Balance Disturbance	neuromuscular  · Identified Impairments Contributing to Balance Disturbance	decreased strength; impaired postural control; decreased ROM; impaired coordination; impaired motor control    Sensory Examination:     Grossly Intact:   · Gross Sensory Examination	Grossly Intact; Left UE; Right UE; Left LE; Right LE    · Balance Skills	Pt took ~6 small steps w/ Max Ax2 RW      Fine Motor Coordination:     Grossly Intact:   · Grossly Intact	Left UE; Right UE      Fine Motor Coordination:   · Left Hand, Manipulation of Objects	normal performance  · Right Hand, Manipulation of Objects	normal performance    Upper Body Dressing Training:     · Level of [place]	maximum assist (25% patients effort)  · Physical Assist/Nonphysical Assist	1 person assist; supervision    Lower Body Dressing Training:     · Level of [place]	dependent (less than 25% patients effort)  · Physical Assist/Nonphysical Assist	1 person assist; verbal cues    Toilet Hygiene Training:     · Level of [place]	dependent (less than 25% patients effort)    Grooming Training:     · Level of [place]	minimum assist (75% patients effort)  · Physical Assist/Nonphysical Assist	1 person assist    Eating/Self-Feeding Training:     · Level of [place]	independent  · Physical Assist/Nonphysical Assist	supervision; verbal cues          PM&R Impression : as above    Current disposition plan recommendation :    subacute rehab placement

## 2023-09-13 NOTE — DISCHARGE NOTE PROVIDER - NSDCMRMEDTOKEN_GEN_ALL_CORE_FT
acetaminophen 325 mg oral tablet: 2 tab(s) orally every 6 hours As needed Temp greater or equal to 38C (100.4F), Mild Pain (1 - 3)  amoxicillin-clavulanate 875 mg-125 mg oral tablet: 1 tab(s) orally once  aspirin 81 mg oral delayed release tablet: 1 tab(s) orally 2 times a day  enoxaparin: 40 subcutaneous once a day  ergocalciferol 1.25 mg (50,000 intl units) oral capsule: 50,000 international unit(s) orally every 7 days RECHECK AFTER 8 WEEKS  levothyroxine 25 mcg (0.025 mg) oral tablet: 1 tab(s) orally once a day  melatonin 3 mg oral tablet: 1 tab(s) orally once a day (at bedtime) As needed Insomnia  oxyCODONE 5 mg oral tablet: 1 tab(s) orally every 4 hours As needed Moderate Pain (4 - 6)  pantoprazole 40 mg oral delayed release tablet: 1 tab(s) orally once a day (before a meal)  senna leaf extract oral tablet: 2 tab(s) orally once a day (at bedtime)  sertraline 25 mg oral tablet: 1 tab(s) orally once a day   acetaminophen 325 mg oral tablet: 2 tab(s) orally every 6 hours As needed Temp greater or equal to 38C (100.4F), Mild Pain (1 - 3)  amoxicillin-clavulanate 875 mg-125 mg oral tablet: 1 tab(s) orally 2 times a day  aspirin 81 mg oral delayed release tablet: 1 tab(s) orally 2 times a day  enoxaparin: 40 subcutaneous once a day  ergocalciferol 1.25 mg (50,000 intl units) oral capsule: 50,000 international unit(s) orally every 7 days RECHECK AFTER 8 WEEKS  levothyroxine 25 mcg (0.025 mg) oral tablet: 1 tab(s) orally once a day  melatonin 3 mg oral tablet: 1 tab(s) orally once a day (at bedtime) As needed Insomnia  oxyCODONE 5 mg oral tablet: 1 tab(s) orally every 4 hours As needed Moderate Pain (4 - 6)  pantoprazole 40 mg oral delayed release tablet: 1 tab(s) orally once a day (before a meal)  senna leaf extract oral tablet: 2 tab(s) orally once a day (at bedtime)  sertraline 25 mg oral tablet: 1 tab(s) orally once a day

## 2023-09-13 NOTE — PROGRESS NOTE ADULT - ASSESSMENT
I M     83 y o female with PMHx of hypothyroidism, osteoporosis and anxiety who presents to the ED 8 days after left hemiarthroplasty with concern of postop infection. The pt was also found to be hyponatremic from baseline. The pt was admitted for management of possible infection and hyponatremia     Problem/Plan - 1:  ·  Problem: Surgical site infection.   ·  Plan: Patient 8 days post op coming in with +WBC, +ESR, +CRP, and initial concern for surgical site infection given pain to left hip and drainage that the family noticed. No drainage noticed during our evaluation. Orthopedics consulted in ED and states no need for acute intervention. No sx of cough, diarrhea, n/v, URI sx.   S/p vanc / zosyn 3.375 in ED  Surgical site on exam is c/d/i without erythema or signs of infection. Leukocytosis is improving. Blood culture negative, surgical swab culture showed rare E.coli and numerous Staph Epidermidis. Gram stain shows few gram positive cocci and few WBCs.   WBC count today WNL 7.78, CRP downtrending.  Orthopedic surgery following.  Plan:  - vanc/zosyn discontinued as infection seems unlikely.  - Orthopedic surgery recommends daily dressing changes w/ xeroform, 4x4, and paper tape  - f/u final wound culture results  - trend CBC daily   - Monitor vitals daily or more frequent if needed.    Problem/Plan - 2:  ·  Problem: Hyponatremia.   ·  Plan: Patient with Na 118 on arrival improved to 123 w/o intervention in ED. Na 130-131 yesterday. Received 2 doses desmopressin 1mcg and 1.5L D5W yesterday. Today Na down to 120, stable overnight.  On chart review was hyponatremic to low 130/high 120's on last admission. Was previously dx with hypovolemic hypoNa. Of note, pt has also been on Sertraline for many years.   Phosphate low, given PHOS-NaK powder.   Serum osmolality 278, urine osmolality 262 (up from 79). Urine Na 20.    Plan:  - F/u BMP   - F/u AM cortisol  - F/u renal recommendations  - non-diabetic A1c 6.1.    Problem/Plan - 3:  ·  Problem: Hypothyroidism.   ·  Plan: cw synthroid 25 home.    Problem/Plan - 4:  ·  Problem: Nutrition, metabolism, and development symptoms.   ·  Plan: F: 500 D5W  E: Replete as necessary K>4 Mg>2  N: DASH diet   DVT Prophylaxis: Lovenox 40mg daily   GI prophylaxis: None   CODE STATUS: FULL.

## 2023-09-13 NOTE — PROGRESS NOTE ADULT - REASON FOR ADMISSION
Left hip pain and hyponatremia
Hyponatremia and concern for infection after hip surgery
Leukocytosis after Ortho surgery

## 2023-09-13 NOTE — DISCHARGE NOTE PROVIDER - HOSPITAL COURSE
The patient is a 84yo female with PMHx of hypothyroidism, osteoporosis and anxiety who presents to the ED 8 days after left hemiarthroplasty with concern of postop infection. The pt was also found to be hyponatremic from baseline. The pt was admitted for management of possible infection and hyponatremia. On admission, the pt did not meet SIRS criteria but had elevated WBC, +ESR, +CRP, and initial concern for surgical site infection given pain to left hip and drainage that the family noticed. No drainage noticed during our evaluation. Orthopedics consulted in ED and states no need for acute intervention. She was given Vancomycin and Zosyn in the ED which was discontinued as the pt's lab did not indicate any signs of infection and her wound was clean, dry, intact, without erythema or pain. Wound culture grew rare E. coli and Staph epidermidis.       Problem List/Main Diagnoses:     #Surgical Site Infection   Patient 8 days post. The pt's family was concerned about an infection as they noticed discharge from the site. On our evaluation the pt's wound was c/d/i, no discharge noticed, no erythema or tenderness. Wound swab cultures were positive for rale E.coli and Staph epidermidis.   Plan:  - Please start taking Augmentin 875/125 mg PO BID for 5 days   - We recommend daily dressing changes to the area with xeroform, 4x4, and paper tape    #Hyponatremia   The pt was found to be hyponatremic on admission to 118. The pt was given Normal Saline in the ED and her repeat sodium was 130. Due to the fast correction, we gave a bolus of D5W to return the sodium to 4-6 meq from initial value. Repeat sodium was 131 and nephrology was involved who recommended ddavp and more D5W. The patient's sodium returned to 124 on repeat labs and follow up labs later showed Na to be 120. The pt was given 3% saline for over 3 hours and her sodium returned to 127. The pt sodium on the day of discharge was ____. The pt was mentating well, hemodynamically stable for discharge.     New medications/therapies: Augmentin 875/125 mg PO  New lines/hardware: None  Labs to be followed outpatient: BMP for low sodium  Exam to be followed outpatient: Surgical site on left hip    Discharge plan: discharge to [facility]    Physical Exam Upon Discharge:  T(C): 36.3 (09-13-23 @ 12:11), Max: 37.3 (09-12-23 @ 21:20)  HR: 87 (09-13-23 @ 12:11) (77 - 98)  BP: 130/71 (09-13-23 @ 12:11) (112/71 - 130/71)  RR: 17 (09-13-23 @ 12:11) (17 - 18)  SpO2: 99% (09-13-23 @ 12:11) (95% - 99%)    General: NAD, laying in bed, speaking in full sentences  HEENT: head NC/AT, no conjunctival injection, EOMI, MMM  Neck: supple, no JVD  Cardio: RRR, +S1/S2, no M/R/G  Resp: lungs CTAB, no cough/wheezes/rales/rhonchi  Abdo: soft, NT, ND, +bowel sounds x4, no organomegaly or palpable mass    Extremities: WWP, no edema/cyanosis/clubbing   Vasc: 2+ radial and DP pulses b/l  Neuro: A&Ox3  Psych: speech non-pressured, thoughts goal-oriented  Skin: left hip surgical site is c/d/i, no drainage, no swelling, erythema or tenderness to palpation  MSK: no joint swelling       The patient is a 84yo female with PMHx of hypothyroidism, osteoporosis and anxiety who presents to the ED 8 days after left hemiarthroplasty with concern of postop infection. The pt was also found to be hyponatremic from baseline. The pt was admitted for management of possible infection and hyponatremia. On admission, the pt did not meet SIRS criteria but had elevated WBC, +ESR, +CRP, and initial concern for surgical site infection given pain to left hip and drainage that the family noticed. No drainage noticed during our evaluation. Orthopedics consulted in ED and states no need for acute intervention. She was given Vancomycin and Zosyn in the ED which was discontinued as the pt's lab did not indicate any signs of infection and her wound was clean, dry, intact, without erythema or pain. Wound culture grew rare E. coli and Staph epidermidis.       Problem List/Main Diagnoses:     #Surgical Site Infection   Patient 8 days post. The pt's family was concerned about an infection as they noticed discharge from the site. On our evaluation the pt's wound was c/d/i, no discharge noticed, no erythema or tenderness. Wound swab cultures were positive for rale E.coli and Staph epidermidis.   Plan:  - Please start taking Augmentin 875/125 mg PO BID for 5 days   - We recommend daily dressing changes to the area with xeroform, 4x4, and paper tape    #Hyponatremia   The pt was found to be hyponatremic on admission to 118. The pt was given Normal Saline in the ED and her repeat sodium was 130. Due to the fast correction, we gave a bolus of D5W to return the sodium to 4-6 meq from initial value. Repeat sodium was 131 and nephrology was involved who recommended ddavp and more D5W. The patient's sodium returned to 124 on repeat labs and follow up labs later showed Na to be 120. The pt was given 3% saline for over 3 hours and her sodium returned to 127. The pt sodium on the day of discharge was 133. The pt was mentating well, hemodynamically stable for discharge.     New medications/therapies: Augmentin 875/125 mg PO  New lines/hardware: None  Labs to be followed outpatient: BMP for low sodium  Exam to be followed outpatient: Surgical site on left hip    Discharge plan: discharge to [facility]    Physical Exam Upon Discharge:  T(C): 36.3 (09-13-23 @ 12:11), Max: 37.3 (09-12-23 @ 21:20)  HR: 87 (09-13-23 @ 12:11) (77 - 98)  BP: 130/71 (09-13-23 @ 12:11) (112/71 - 130/71)  RR: 17 (09-13-23 @ 12:11) (17 - 18)  SpO2: 99% (09-13-23 @ 12:11) (95% - 99%)    General: NAD, laying in bed, speaking in full sentences  HEENT: head NC/AT, no conjunctival injection, EOMI, MMM  Neck: supple, no JVD  Cardio: RRR, +S1/S2, no M/R/G  Resp: lungs CTAB, no cough/wheezes/rales/rhonchi  Abdo: soft, NT, ND, +bowel sounds x4, no organomegaly or palpable mass    Extremities: WWP, no edema/cyanosis/clubbing   Vasc: 2+ radial and DP pulses b/l  Neuro: A&Ox3  Psych: speech non-pressured, thoughts goal-oriented  Skin: left hip surgical site is c/d/i, no drainage, no swelling, erythema or tenderness to palpation  MSK: no joint swelling       The patient is a 82yo female with PMHx of hypothyroidism, osteoporosis and anxiety who presents to the ED 8 days after left hemiarthroplasty with concern of postop infection. The pt was also found to be hyponatremic from baseline. The pt was admitted for management of possible infection and hyponatremia. On admission, the pt did not meet SIRS criteria but had elevated WBC, +ESR, +CRP, and initial concern for surgical site infection given pain to left hip and drainage that the family noticed. No drainage noticed during our evaluation. Orthopedics consulted in ED and states no need for acute intervention. She was given Vancomycin and Zosyn in the ED which was discontinued as the pt's lab did not indicate any signs of infection and her wound was clean, dry, intact, without erythema or pain. Wound culture grew rare E. coli and Staph epidermidis.       Problem List/Main Diagnoses:     #Left hip zion surgical site skin changes  Patient 8 days post. On our evaluation the pt's wound was c/d/i, no discharge noticed, no erythema or tenderness.  Superficial Wound swab cultures were positive for rale E.coli and Staph epidermidis. Infection unlikely. As per ortho, likely skin irritation from Aquacel dressing. Recommended daily dressing changes w/ xeroform, 4x4, and paper tape.  Plan:  - Augmentin 875/125 mg PO BID for 5 days   - We recommend daily dressing changes to the area with xeroform, 4x4, and paper tape    #Hyponatremia   The pt was found to be hyponatremic on admission to 118. The pt was given Normal Saline in the ED and her repeat sodium was 130. Due to the fast correction, we gave a bolus of D5W to return the sodium to 4-6 meq from initial value. Repeat sodium was 131 and nephrology was involved who recommended ddavp and more D5W. The patient's sodium returned to 124 on repeat labs and follow up labs later showed Na to be 120. The pt was given 3% saline for over 3 hours and her sodium returned to 127. The pt sodium on the day of discharge was 133. The pt was mentating well, hemodynamically stable for discharge.     New medications/therapies: Augmentin 875/125 mg PO  New lines/hardware: None  Labs to be followed outpatient: BMP for low sodium  Exam to be followed outpatient: Surgical site on left hip    Discharge plan: discharge to [facility]    Physical Exam Upon Discharge:  T(C): 36.3 (09-13-23 @ 12:11), Max: 37.3 (09-12-23 @ 21:20)  HR: 87 (09-13-23 @ 12:11) (77 - 98)  BP: 130/71 (09-13-23 @ 12:11) (112/71 - 130/71)  RR: 17 (09-13-23 @ 12:11) (17 - 18)  SpO2: 99% (09-13-23 @ 12:11) (95% - 99%)    General: NAD, laying in bed, speaking in full sentences  HEENT: head NC/AT, no conjunctival injection, EOMI, MMM  Neck: supple, no JVD  Cardio: RRR, +S1/S2, no M/R/G  Resp: lungs CTAB, no cough/wheezes/rales/rhonchi  Abdo: soft, NT, ND, +bowel sounds x4, no organomegaly or palpable mass    Extremities: WWP, no edema/cyanosis/clubbing   Vasc: 2+ radial and DP pulses b/l  Neuro: A&Ox3  Psych: speech non-pressured, thoughts goal-oriented  Skin: left hip surgical site is c/d/i, no drainage, no swelling, erythema or tenderness to palpation  MSK: no joint swelling

## 2023-09-13 NOTE — DISCHARGE NOTE PROVIDER - ATTENDING DISCHARGE PHYSICAL EXAMINATION:
PHYSICAL EXAM:  General: NAD; speaking in full sentences  HEENT: NC/AT; PERRL; EOMI; MMM  Neck: supple; no JVD  Cardiac: RRR; +S1/S2  Pulm: CTA B/L; no W/R/R  GI: soft, NT/ND, +BS  Extremities: left hip surgical wound healing, no purulent discharge. 1+ pitting edema of b/l LE. Pulses intact   Neuro: AAOx3; no focal deficits

## 2023-09-13 NOTE — DISCHARGE NOTE NURSING/CASE MANAGEMENT/SOCIAL WORK - PATIENT PORTAL LINK FT
You can access the FollowMyHealth Patient Portal offered by [facility] [place] by registering at the following website: [email or web address]. By joining [facility]’s FollowMyHealth portal, you will also be able to view your health information using other applications (apps) compatible with our system.

## 2023-09-15 ENCOUNTER — TRANSCRIPTION ENCOUNTER (OUTPATIENT)
Age: 83
End: 2023-09-15

## 2023-09-16 LAB
-  AMPICILLIN: SIGNIFICANT CHANGE UP
-  VANCOMYCIN: SIGNIFICANT CHANGE UP
CULTURE RESULTS: SIGNIFICANT CHANGE UP
METHOD TYPE: SIGNIFICANT CHANGE UP
ORGANISM # SPEC MICROSCOPIC CNT: SIGNIFICANT CHANGE UP
SPECIMEN SOURCE: SIGNIFICANT CHANGE UP

## 2023-09-18 DIAGNOSIS — L98.491 NON-PRESSURE CHRONIC ULCER OF SKIN OF OTHER SITES LIMITED TO BREAKDOWN OF SKIN: ICD-10-CM

## 2023-09-18 DIAGNOSIS — E87.1 HYPO-OSMOLALITY AND HYPONATREMIA: ICD-10-CM

## 2023-09-18 DIAGNOSIS — M81.0 AGE-RELATED OSTEOPOROSIS WITHOUT CURRENT PATHOLOGICAL FRACTURE: ICD-10-CM

## 2023-09-18 DIAGNOSIS — F41.9 ANXIETY DISORDER, UNSPECIFIED: ICD-10-CM

## 2023-09-18 DIAGNOSIS — Z79.82 LONG TERM (CURRENT) USE OF ASPIRIN: ICD-10-CM

## 2023-09-18 DIAGNOSIS — Z79.890 HORMONE REPLACEMENT THERAPY: ICD-10-CM

## 2023-09-18 DIAGNOSIS — E03.9 HYPOTHYROIDISM, UNSPECIFIED: ICD-10-CM

## 2023-10-19 PROBLEM — Z00.00 ENCOUNTER FOR PREVENTIVE HEALTH EXAMINATION: Status: ACTIVE | Noted: 2023-10-19

## 2023-11-08 ENCOUNTER — APPOINTMENT (OUTPATIENT)
Dept: GERIATRICS | Facility: CLINIC | Age: 83
End: 2023-11-08

## 2023-11-13 PROCEDURE — 85025 COMPLETE CBC W/AUTO DIFF WBC: CPT

## 2023-11-13 PROCEDURE — 87075 CULTR BACTERIA EXCEPT BLOOD: CPT

## 2023-11-13 PROCEDURE — 81001 URINALYSIS AUTO W/SCOPE: CPT

## 2023-11-13 PROCEDURE — 84443 ASSAY THYROID STIM HORMONE: CPT

## 2023-11-13 PROCEDURE — 85652 RBC SED RATE AUTOMATED: CPT

## 2023-11-13 PROCEDURE — 96374 THER/PROPH/DIAG INJ IV PUSH: CPT

## 2023-11-13 PROCEDURE — 97165 OT EVAL LOW COMPLEX 30 MIN: CPT

## 2023-11-13 PROCEDURE — 86140 C-REACTIVE PROTEIN: CPT

## 2023-11-13 PROCEDURE — 80048 BASIC METABOLIC PNL TOTAL CA: CPT

## 2023-11-13 PROCEDURE — 87040 BLOOD CULTURE FOR BACTERIA: CPT

## 2023-11-13 PROCEDURE — 96375 TX/PRO/DX INJ NEW DRUG ADDON: CPT

## 2023-11-13 PROCEDURE — 84540 ASSAY OF URINE/UREA-N: CPT

## 2023-11-13 PROCEDURE — 84550 ASSAY OF BLOOD/URIC ACID: CPT

## 2023-11-13 PROCEDURE — 97161 PT EVAL LOW COMPLEX 20 MIN: CPT

## 2023-11-13 PROCEDURE — 80053 COMPREHEN METABOLIC PANEL: CPT

## 2023-11-13 PROCEDURE — 83935 ASSAY OF URINE OSMOLALITY: CPT

## 2023-11-13 PROCEDURE — 36415 COLL VENOUS BLD VENIPUNCTURE: CPT

## 2023-11-13 PROCEDURE — 83605 ASSAY OF LACTIC ACID: CPT

## 2023-11-13 PROCEDURE — 73502 X-RAY EXAM HIP UNI 2-3 VIEWS: CPT

## 2023-11-13 PROCEDURE — 83036 HEMOGLOBIN GLYCOSYLATED A1C: CPT

## 2023-11-13 PROCEDURE — 85027 COMPLETE CBC AUTOMATED: CPT

## 2023-11-13 PROCEDURE — 87186 SC STD MICRODIL/AGAR DIL: CPT

## 2023-11-13 PROCEDURE — 84156 ASSAY OF PROTEIN URINE: CPT

## 2023-11-13 PROCEDURE — 97530 THERAPEUTIC ACTIVITIES: CPT

## 2023-11-13 PROCEDURE — 73552 X-RAY EXAM OF FEMUR 2/>: CPT

## 2023-11-13 PROCEDURE — 87070 CULTURE OTHR SPECIMN AEROBIC: CPT

## 2023-11-13 PROCEDURE — 99285 EMERGENCY DEPT VISIT HI MDM: CPT | Mod: 25

## 2023-11-13 PROCEDURE — 84436 ASSAY OF TOTAL THYROXINE: CPT

## 2023-11-13 PROCEDURE — 84133 ASSAY OF URINE POTASSIUM: CPT

## 2023-11-13 PROCEDURE — 82570 ASSAY OF URINE CREATININE: CPT

## 2023-11-13 PROCEDURE — 84300 ASSAY OF URINE SODIUM: CPT

## 2023-11-13 PROCEDURE — 83735 ASSAY OF MAGNESIUM: CPT

## 2023-11-13 PROCEDURE — 83930 ASSAY OF BLOOD OSMOLALITY: CPT

## 2023-11-13 PROCEDURE — 84100 ASSAY OF PHOSPHORUS: CPT

## 2023-12-15 NOTE — ED ADULT NURSE NOTE - SKIN INTEGRITY
12-12    142  |  107  |  17  ----------------------------<  86  3.8   |  35<H>  |  0.57    Ca    8.4<L>      12 Dec 2023 05:20  Phos  4.1     12-11  Mg     2.0     12-11     intact 12-14    142  |  104  |  17  ----------------------------<  144<H>  3.4<L>   |  36<H>  |  0.67    Ca    8.6      14 Dec 2023 09:52     12-13    140  |  104  |  13  ----------------------------<  88  3.5   |  35<H>  |  0.61    Ca    8.7      13 Dec 2023 05:40  Phos  4.7     12-13  Mg     2.0     12-13     no new labs

## 2024-04-24 ENCOUNTER — APPOINTMENT (OUTPATIENT)
Dept: NEUROLOGY | Facility: CLINIC | Age: 84
End: 2024-04-24
Payer: MEDICARE

## 2024-04-24 VITALS
DIASTOLIC BLOOD PRESSURE: 69 MMHG | TEMPERATURE: 97.4 F | OXYGEN SATURATION: 97 % | SYSTOLIC BLOOD PRESSURE: 100 MMHG | HEART RATE: 81 BPM

## 2024-04-24 DIAGNOSIS — R41.3 OTHER AMNESIA: ICD-10-CM

## 2024-04-24 PROCEDURE — 99204 OFFICE O/P NEW MOD 45 MIN: CPT

## 2024-04-24 NOTE — HISTORY OF PRESENT ILLNESS
[FreeTextEntry1] : PCP Dr. Maryam Gonzalez  Here for initial evaluation of gait impairment. She's had trouble walking for about a year, has had some falls, broke her left hip and had partial hip replacement in September 2023. Feels off balance, has trouble making a fist, complains of back pain intermittently. Daughter reports memory issues for about 2 years.   She denies neck pain, numbness, tingling, dysphagia, dysarthria, dyspnea on exertion.   Reviewed: Notes from prior hospitalization including discharge summary Labs - Na 137, B12 448, A1C 6.1%, TSH 24, T4 1.0

## 2024-04-24 NOTE — PHYSICAL EXAM
[FreeTextEntry1] : Gen: appears well, well-nourished, no acute distress  MS: awake, alert, speech fluent, comprehension intact, short term memory appears impaired, psychomotor slowing, follows commands although with some delay  CN: PERRL, EOMI, visual fields full, facial strength and sensation intact and symmetric, palate elevation symmetric, tongue midline, no tongue atrophy or fasciculations  Motor: normal bulk and tone, strength 4/5 proximally and distally in UE, 4-/5 hip flexion, knee flex/ext 4/5, ankle dorsiflexion 5/5  Sensory: vibration mildly reduced at toes and ankles b/l; JPS equivocal   Reflexes: 2+ symmetric throughout, no Hoffmans sign, plantar responses flexor bilaterally  Coordination: no dysmetria on finger to nose  Gait: can stand up with walker and assistance, take a few steps with walker but very slow and labored

## 2024-04-24 NOTE — ASSESSMENT
[FreeTextEntry1] : Generalized weakness on exam with relatively intact sensation and reflexes suggestive of muscle or neuromuscular junction disease - check labs including CK, ESR, CRP, SPEP, AchR, MuSK, VGCC  Memory impairment - unclear if related, will get MRI brain, consider neuropsych testing in the future

## 2024-04-24 NOTE — CONSULT LETTER
[Dear  ___] : Dear  [unfilled], [Consult Letter:] : I had the pleasure of evaluating your patient, [unfilled]. [Please see my note below.] : Please see my note below. [Consult Closing:] : Thank you very much for allowing me to participate in the care of this patient.  If you have any questions, please do not hesitate to contact me. [Sincerely,] : Sincerely, [FreeTextEntry3] : Goyo Mayes M.D. Neurology, Electromyography and Neuromuscular Medicine [facility]   of Neurology [facility] / [facility]

## 2024-05-03 ENCOUNTER — APPOINTMENT (OUTPATIENT)
Dept: MRI IMAGING | Facility: CLINIC | Age: 84
End: 2024-05-03
Payer: MEDICARE

## 2024-05-03 LAB
25(OH)D3 SERPL-MCNC: 31.5 NG/ML
ACRM BINDING ANTIBODY: <0.03 NMOL/L
ALBUMIN MFR SERPL ELPH: 50.1 %
ALBUMIN SERPL-MCNC: 3.7 G/DL
ALBUMIN/GLOB SERPL: 1 RATIO
ALPHA1 GLOB MFR SERPL ELPH: 3.8 %
ALPHA1 GLOB SERPL ELPH-MCNC: 0.3 G/DL
ALPHA2 GLOB MFR SERPL ELPH: 8.5 %
ALPHA2 GLOB SERPL ELPH-MCNC: 0.6 G/DL
B-GLOBULIN MFR SERPL ELPH: 11.1 %
B-GLOBULIN SERPL ELPH-MCNC: 0.8 G/DL
CK SERPL-CCNC: 17 U/L
COPPER SERPL-MCNC: 90 UG/DL
CRP SERPL-MCNC: <3 MG/L
DEPRECATED KAPPA LC FREE/LAMBDA SER: 1.58 RATIO
ERYTHROCYTE [SEDIMENTATION RATE] IN BLOOD BY WESTERGREN METHOD: 41 MM/HR
GAMMA GLOB FLD ELPH-MCNC: 2 G/DL
GAMMA GLOB MFR SERPL ELPH: 26.5 %
IGA SER QL IEP: 335 MG/DL
IGG SER QL IEP: 2031 MG/DL
IGM SER QL IEP: 158 MG/DL
INTERPRETATION SERPL IEP-IMP: NORMAL
KAPPA LC CSF-MCNC: 3.29 MG/DL
KAPPA LC SERPL-MCNC: 5.21 MG/DL
M PROTEIN SPEC IFE-MCNC: NORMAL
MUSK ANTIBODY TEST: NORMAL
PROT SERPL-MCNC: 7.4 G/DL
PROT SERPL-MCNC: 7.4 G/DL
T PALLIDUM AB SER QL IA: NEGATIVE
VGCC-P/Q BIND AB SER-SCNC: 0 PMOL/L

## 2024-05-03 PROCEDURE — 70551 MRI BRAIN STEM W/O DYE: CPT

## 2024-05-09 ENCOUNTER — TRANSCRIPTION ENCOUNTER (OUTPATIENT)
Age: 84
End: 2024-05-09

## 2024-05-13 ENCOUNTER — APPOINTMENT (OUTPATIENT)
Dept: NEUROSURGERY | Facility: CLINIC | Age: 84
End: 2024-05-13
Payer: MEDICARE

## 2024-05-13 VITALS
RESPIRATION RATE: 18 BRPM | SYSTOLIC BLOOD PRESSURE: 117 MMHG | OXYGEN SATURATION: 98 % | BODY MASS INDEX: 23.56 KG/M2 | WEIGHT: 120 LBS | DIASTOLIC BLOOD PRESSURE: 80 MMHG | HEIGHT: 60 IN | HEART RATE: 79 BPM

## 2024-05-13 PROCEDURE — 99205 OFFICE O/P NEW HI 60 MIN: CPT

## 2024-05-13 NOTE — REASON FOR VISIT
[New Patient Visit] : a new patient visit [Referred By: _________] : Patient was referred by YOSI [Formal Caregiver] : formal caregiver [Family Member] : family member

## 2024-05-13 NOTE — REVIEW OF SYSTEMS
[Memory Lapses or Loss] : memory loss [Inability to Walk] : inability to walk [As Noted in HPI] : as noted in HPI [Negative] : Gastrointestinal

## 2024-05-17 NOTE — PHYSICAL EXAM
[General Appearance - Alert] : alert [General Appearance - In No Acute Distress] : in no acute distress [General Appearance - Well Nourished] : well nourished [Affect] : the affect was normal [Person] : oriented to person [Place] : oriented to place [Remote Intact] : remote memory intact [Visual Intact] : visual attention was ~T not ~L decreased [Fluency] : fluency intact [Past History] : adequate knowledge of personal past history [Vocabulary] : adequate range of vocabulary [5] : S1 flexor hallucis longus 5/5 [Limited Balance] : the patient's balance was impaired [FreeTextEntry1] : AAOx2 [Time] : disoriented to time [Short Term Intact] : short term memory impaired [Registration Intact] : recent registration memory impaired [Span Intact] : the attention span was decreased [Concentration Intact] : a decrease in concentrating ability was observed [Comprehension] : comprehension not intact [Current Events] : inadequate knowledge of current events

## 2024-05-17 NOTE — ASSESSMENT
[FreeTextEntry1] : Pending evaluation for neuropathy. She will come back to our office in one month after EMG evaluation by Dr. Mayes. Given her current significant gait problem, hydrocephalus may be contributory. She may beneficial for VPS placement if there no other significant neurological finding.

## 2024-05-17 NOTE — HISTORY OF PRESENT ILLNESS
[FreeTextEntry1] : gait and memory problem for one year [de-identified] : 84 yo F with PMH of HTN, HLD, hypothyroidism who had recent mechanical falls underwent L hip surger in 10/2023. As per patient's son, she started to have significantly worsening memory/cognitive problem as well as markedly difficulty walking (able to stand with walker but cannot walk more than 1-2 steps). Additionally she has been having urinary incontinence over couple of years. She denies headache, dizziness, n/v, visual changes, seizure activity. Recently evaluated by Dr. 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

## 2024-05-17 NOTE — DATA REVIEWED
[de-identified] : brain wo on 4/26/2024 in PACS EXAM: [id number] - MR BRAIN  - ORDERED BY: JARED LINCOLN   PROCEDURE DATE:  05/03/2024    INTERPRETATION:  .  CLINICAL INFORMATION: Generalized weakness. Gait and memory issues.  TECHNIQUE: Multiplanar multisequential MRI of the brain was acquired without the administration of IV gadolinium.  COMPARISON: None available.  FINDINGS: Ventriculomegaly is seen out of proportion to sulcal prominence. There is also variable sulcal prominence. There is crowding of the cerebral sulci at the vertex. The callosal angle appears sharp at the level of the posterior commissure measured in the coronal plane.  Multiple patchy confluent nonspecific foci of T2/FLAIR hyperintensity are noted throughout the deep and periventricular white matter of the cerebral hemispheres. There is no associated mass effect. There is no evidence of acute ischemia on the diffusion-weighted images.  There is diffuse cerebral volume loss with prominence of the sulci, fissures, and cisternal spaces which is normal for the patient's age. Flow-voids are noted throughout the major intracranial vessels, on the T2 weighted images, consistent with their patency. The sellar region and posterior fossa appear unremarkable.  The paranasal sinuses and tympanomastoid cavities are clear. Evidence of prior rhinoplasty is seen. Calvarial signal is within normal limits. The orbits appear unremarkable.  IMPRESSION: Imaging findings for which normal pressure or communicating hydrocephalus can be considered. Clinical correlation is required for this diagnosis.  No acute intracranial hemorrhage or evidence of acute ischemia.  Multiple patchy confluent nonspecific abnormal white matter foci of T2/FLAIR prolongation statistically favoring microvascular type changes.  --- End of Report ---       MARC LAY MD; Attending Radiologist This document has been electronically signed. May  4 2024  1:32PM

## 2024-05-22 ENCOUNTER — TRANSCRIPTION ENCOUNTER (OUTPATIENT)
Age: 84
End: 2024-05-22

## 2024-05-28 ENCOUNTER — TRANSCRIPTION ENCOUNTER (OUTPATIENT)
Age: 84
End: 2024-05-28

## 2024-05-31 ENCOUNTER — APPOINTMENT (OUTPATIENT)
Dept: NEUROLOGY | Facility: CLINIC | Age: 84
End: 2024-05-31
Payer: MEDICARE

## 2024-05-31 ENCOUNTER — NON-APPOINTMENT (OUTPATIENT)
Age: 84
End: 2024-05-31

## 2024-05-31 VITALS
DIASTOLIC BLOOD PRESSURE: 74 MMHG | HEIGHT: 60 IN | HEART RATE: 89 BPM | OXYGEN SATURATION: 97 % | SYSTOLIC BLOOD PRESSURE: 115 MMHG | TEMPERATURE: 97.2 F

## 2024-05-31 DIAGNOSIS — M62.81 MUSCLE WEAKNESS (GENERALIZED): ICD-10-CM

## 2024-05-31 PROCEDURE — 95885 MUSC TST DONE W/NERV TST LIM: CPT

## 2024-05-31 PROCEDURE — 95910 NRV CNDJ TEST 7-8 STUDIES: CPT

## 2024-05-31 RX ORDER — TIMOLOL MALEATE 2.5 MG/ML
0.25 SOLUTION/ DROPS OPHTHALMIC
Refills: 0 | Status: ACTIVE | COMMUNITY

## 2024-05-31 RX ORDER — ATORVASTATIN CALCIUM 10 MG/1
10 TABLET, FILM COATED ORAL DAILY
Refills: 0 | Status: ACTIVE | COMMUNITY

## 2024-05-31 RX ORDER — SENNOSIDES 8.6 MG TABLETS 8.6 MG/1
8.6 TABLET ORAL
Refills: 0 | Status: ACTIVE | COMMUNITY

## 2024-05-31 RX ORDER — LATANOPROST/PF 0.005 %
0.01 DROPS OPHTHALMIC (EYE)
Refills: 0 | Status: ACTIVE | COMMUNITY

## 2024-05-31 RX ORDER — ALENDRONATE SODIUM 70 MG/1
70 TABLET ORAL
Refills: 0 | Status: ACTIVE | COMMUNITY

## 2024-05-31 RX ORDER — LEVOTHYROXINE SODIUM 50 UG/1
50 TABLET ORAL DAILY
Refills: 0 | Status: ACTIVE | COMMUNITY

## 2024-05-31 RX ORDER — B-COMPLEX WITH VITAMIN C
600-5 TABLET ORAL DAILY
Refills: 0 | Status: ACTIVE | COMMUNITY

## 2024-05-31 RX ORDER — SERTRALINE HYDROCHLORIDE 50 MG/1
50 TABLET, FILM COATED ORAL DAILY
Refills: 0 | Status: ACTIVE | COMMUNITY

## 2024-06-05 PROBLEM — M62.81 GENERALIZED MUSCLE WEAKNESS: Status: ACTIVE | Noted: 2024-04-24

## 2024-06-05 NOTE — PROCEDURE
[FreeTextEntry1] : Nerve Conduction and Electromyography Report [FreeTextEntry3] : Electro Physiologic Findings:  Limb temperature was monitored and maintained at approximately 30 - 34 C in the lower extremities.  The right superficial fibular sensory response was normal, while on the left it was absent. The right sural sensory response was normal. The left fibular motor response was mildly slow across the fibular head without conduction block; on the right it was normal. The tibial motor responses were normal bilaterally and symmetric. The tibial and fibular F-wave latencies were normal bilaterally and symmetric.   Needle electromyography was performed on select right upper and lower extremity appendicular muscles. There was mild chronic denervation in the right triceps brachii. The other muscles tested were unremarkable, although a few were mildly limited by submaximal activation.   Clinical Electrophysiological Impression:   This electrodiagnostic study was largely unremarkable. There was a relatively mild left common fibular (peroneal) nerve entrapment at the fibular head, incidental as she has no corresponding symptoms. There was no evidence of polyneuropathy, myopathy, or motor neuron disease.

## 2024-06-07 LAB
ANTI-CN-1A (NT5C1A) IBM: <20 UNITS
HTLV I+II AB SER QL: NEGATIVE

## 2024-06-13 PROBLEM — E78.5 HYPERLIPIDEMIA: Status: RESOLVED | Noted: 2024-05-13 | Resolved: 2024-06-13

## 2024-06-13 PROBLEM — I10 HIGH BLOOD PRESSURE: Status: RESOLVED | Noted: 2024-05-13 | Resolved: 2024-06-13

## 2024-06-13 PROBLEM — Z86.39 HISTORY OF HYPOTHYROIDISM: Status: RESOLVED | Noted: 2024-05-13 | Resolved: 2024-06-13

## 2024-06-13 PROBLEM — H40.9 GLAUCOMA: Status: RESOLVED | Noted: 2024-05-13 | Resolved: 2024-06-13

## 2024-06-13 PROBLEM — G91.2 NORMAL PRESSURE HYDROCEPHALUS: Status: ACTIVE | Noted: 2024-05-13

## 2024-06-14 ENCOUNTER — APPOINTMENT (OUTPATIENT)
Dept: NEUROSURGERY | Facility: CLINIC | Age: 84
End: 2024-06-14
Payer: MEDICARE

## 2024-06-14 VITALS
DIASTOLIC BLOOD PRESSURE: 80 MMHG | RESPIRATION RATE: 18 BRPM | WEIGHT: 120 LBS | HEIGHT: 60 IN | OXYGEN SATURATION: 97 % | HEART RATE: 80 BPM | BODY MASS INDEX: 23.56 KG/M2 | TEMPERATURE: 97.2 F | SYSTOLIC BLOOD PRESSURE: 134 MMHG

## 2024-06-14 DIAGNOSIS — H40.9 UNSPECIFIED GLAUCOMA: ICD-10-CM

## 2024-06-14 DIAGNOSIS — E78.5 HYPERLIPIDEMIA, UNSPECIFIED: ICD-10-CM

## 2024-06-14 DIAGNOSIS — I10 ESSENTIAL (PRIMARY) HYPERTENSION: ICD-10-CM

## 2024-06-14 DIAGNOSIS — G91.2 (IDIOPATHIC) NORMAL PRESSURE HYDROCEPHALUS: ICD-10-CM

## 2024-06-14 DIAGNOSIS — Z86.39 PERSONAL HISTORY OF OTHER ENDOCRINE, NUTRITIONAL AND METABOLIC DISEASE: ICD-10-CM

## 2024-06-14 PROCEDURE — 99215 OFFICE O/P EST HI 40 MIN: CPT

## 2024-06-14 NOTE — REASON FOR VISIT
[Follow-Up: _____] : a [unfilled] follow-up visit [Family Member] : family member [FreeTextEntry1] : recent office visit for NPH returns to discuss further surgical intervention (VPS placement) and review EMG LE (on 5/31/24, unremarkable exam)

## 2024-06-14 NOTE — ASSESSMENT
[FreeTextEntry1] : NPH, discussed surgical intervention of VPS placement. Risks, benefit and alternative to the surgery was explained to the patient and son and daughter. She verbalizes understanding and would like to proceed.  PLAN - [facility] wo (SUKH protocol) for preop mapping - tentative surgery date on 7/10/2024 - medical clearance (clearance packet was provided today, Stop taking multivitamins one week before)

## 2024-06-14 NOTE — HISTORY OF PRESENT ILLNESS
[FreeTextEntry1] : gait and memory problem for one year [de-identified] : 82 yo F with PMH of HTN, HLD, hypothyroidism who had recent mechanical falls underwent L hip surger in 10/2023. As per patient's son, she started to have significantly worsening memory/cognitive problem as well as markedly difficulty walking (able to stand with walker but cannot walk more than 1-2 steps). Additionally she has been having urinary incontinence over couple of years. She denies headache, dizziness, n/v, visual changes, seizure activity. Recently evaluated by Dr. 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 Plan was made to complete neurology evaluation (EMG) for neuropathy and return to discuss further NPH treatment (possible VPS placement).

## 2024-06-17 ENCOUNTER — TRANSCRIPTION ENCOUNTER (OUTPATIENT)
Age: 84
End: 2024-06-17

## 2024-06-27 ENCOUNTER — APPOINTMENT (OUTPATIENT)
Dept: CT IMAGING | Facility: HOSPITAL | Age: 84
End: 2024-06-27

## 2024-06-27 ENCOUNTER — OUTPATIENT (OUTPATIENT)
Dept: OUTPATIENT SERVICES | Facility: HOSPITAL | Age: 84
LOS: 1 days | End: 2024-06-27
Payer: MEDICARE

## 2024-06-27 PROCEDURE — 70450 CT HEAD/BRAIN W/O DYE: CPT | Mod: 26

## 2024-06-27 PROCEDURE — 70450 CT HEAD/BRAIN W/O DYE: CPT

## 2024-07-23 ENCOUNTER — TRANSCRIPTION ENCOUNTER (OUTPATIENT)
Age: 84
End: 2024-07-23

## 2024-07-24 ENCOUNTER — APPOINTMENT (OUTPATIENT)
Dept: NEUROSURGERY | Facility: HOSPITAL | Age: 84
End: 2024-07-24

## 2024-07-26 ENCOUNTER — TRANSCRIPTION ENCOUNTER (OUTPATIENT)
Age: 84
End: 2024-07-26

## 2024-07-26 PROBLEM — Z87.39 PERSONAL HISTORY OF OTHER DISEASES OF THE MUSCULOSKELETAL SYSTEM AND CONNECTIVE TISSUE: Chronic | Status: ACTIVE | Noted: 2024-07-23

## 2024-07-26 PROBLEM — R29.6 REPEATED FALLS: Chronic | Status: ACTIVE | Noted: 2024-07-23

## 2024-07-26 PROBLEM — Z86.59 PERSONAL HISTORY OF OTHER MENTAL AND BEHAVIORAL DISORDERS: Chronic | Status: ACTIVE | Noted: 2024-07-23

## 2024-07-26 PROBLEM — E03.9 HYPOTHYROIDISM, UNSPECIFIED: Chronic | Status: ACTIVE | Noted: 2024-07-23

## 2024-08-05 ENCOUNTER — APPOINTMENT (OUTPATIENT)
Dept: NEUROSURGERY | Facility: CLINIC | Age: 84
End: 2024-08-05

## 2024-08-05 PROCEDURE — 99024 POSTOP FOLLOW-UP VISIT: CPT

## 2024-08-05 NOTE — HISTORY OF PRESENT ILLNESS
[FreeTextEntry1] : gait and memory problem for one year [de-identified] : 84 yo F with PMH of HTN, HLD, hypothyroidism who had recent mechanical falls underwent L hip surger in 10/2023. As per patient's son, she started to have significantly worsening memory/cognitive problem as well as markedly difficulty walking (able to stand with walker but cannot walk more than 1-2 steps). Additionally she has been having urinary incontinence over couple of years. She denies headache, dizziness, n/v, visual changes, seizure activity. Recently evaluated by Dr. 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 Plan was made to complete neurology evaluation (EMG) for neuropathy and return to discuss further NPH treatment (possible VPS placement).  6/14/24 follow up visit Discussed VPS placement.  She underwent elective VPS placement. Post op hospital stay was uneventful and she was discharged to home on 7/26/24.

## 2024-08-05 NOTE — REASON FOR VISIT
[Formal Caregiver] : formal caregiver [Family Member] : family member [de-identified] : RIGHT frontal approach for ventrici;loperiotoneal shunt placement for NPH [de-identified] : 7/24/24

## 2024-08-05 NOTE — REASON FOR VISIT
[Formal Caregiver] : formal caregiver [Family Member] : family member [de-identified] : RIGHT frontal approach for ventrici;loperiotoneal shunt placement for NPH [de-identified] : 7/24/24

## 2024-08-05 NOTE — ASSESSMENT
[FreeTextEntry1] : stable post op recovery  PLAN - daily shower with shampoo - repeat CTH wo in one month - f/u after images to review  I, Dr. Abhinav Reyes, personally performed the evaluation and management (E/M) services for this established patient who presents today with (a) new problem(s)/exacerbation of (an) existing condition(s). That E/M includes conducting the clinically appropriate interval history &/or exam, assessing all new/exacerbated conditions, and establishing a new plan of care. Today, my LENA, Hyunchu Marga-Gold, was here to observe my evaluation and management service for this new problem/exacerbated condition and follow the plan of care established by me going forward.

## 2024-08-05 NOTE — HISTORY OF PRESENT ILLNESS
[FreeTextEntry1] : gait and memory problem for one year [de-identified] : 82 yo F with PMH of HTN, HLD, hypothyroidism who had recent mechanical falls underwent L hip surger in 10/2023. As per patient's son, she started to have significantly worsening memory/cognitive problem as well as markedly difficulty walking (able to stand with walker but cannot walk more than 1-2 steps). Additionally she has been having urinary incontinence over couple of years. She denies headache, dizziness, n/v, visual changes, seizure activity. Recently evaluated by Dr. 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 Plan was made to complete neurology evaluation (EMG) for neuropathy and return to discuss further NPH treatment (possible VPS placement).  6/14/24 follow up visit Discussed VPS placement.  She underwent elective VPS placement. Post op hospital stay was uneventful and she was discharged to home on 7/26/24.

## 2024-08-05 NOTE — PHYSICAL EXAM
[General Appearance - Alert] : alert [General Appearance - In No Acute Distress] : in no acute distress [General Appearance - Well Nourished] : well nourished [General Appearance - Well-Appearing] : healthy appearing [Longitudinal] : longitudinal [Contracted] : contracted [Transverse] : transverse [Healing Well] : healing well [No Drainage] : without drainage [Normal Skin] : normal [Affect] : the affect was normal [Limited Balance] : the patient's balance was impaired [Erythema] : not erythematous [Tender] : not tender [Warm] : not warm [Indurated] : not indurated [Fluctuant] : not fluctuant [FreeTextEntry1] : R frontal head, R abdomen, R posterior neck

## 2024-08-09 ENCOUNTER — APPOINTMENT (OUTPATIENT)
Dept: NEUROSURGERY | Facility: CLINIC | Age: 84
End: 2024-08-09

## 2024-08-09 PROCEDURE — 99024 POSTOP FOLLOW-UP VISIT: CPT

## 2024-08-09 NOTE — REASON FOR VISIT
[Formal Caregiver] : formal caregiver [Family Member] : family member [de-identified] : RIGHT frontal approach for ventrici;loperiotoneal shunt placement for NPH [de-identified] : 7/24/24

## 2024-08-09 NOTE — PHYSICAL EXAM
[General Appearance - Alert] : alert [General Appearance - In No Acute Distress] : in no acute distress [] : normal voice and communication [Longitudinal] : longitudinal [Healing Well] : healing well [No Drainage] : without drainage [Normal Skin] : normal [FreeTextEntry1] : R frontal head [Impaired Insight] : insight and judgment were intact [Affect] : the affect was normal

## 2024-08-09 NOTE — ASSESSMENT
[FreeTextEntry1] : left over staples removed today  PLAN - daily shower with shampoo - RTC in one month as planned with new CTH

## 2024-08-09 NOTE — HISTORY OF PRESENT ILLNESS
[FreeTextEntry1] : gait and memory problem for one year [de-identified] : 82 yo F with PMH of HTN, HLD, hypothyroidism who had recent mechanical falls underwent L hip surger in 10/2023. As per patient's son, she started to have significantly worsening memory/cognitive problem as well as markedly difficulty walking (able to stand with walker but cannot walk more than 1-2 steps). Additionally she has been having urinary incontinence over couple of years. She denies headache, dizziness, n/v, visual changes, seizure activity. Recently evaluated by Dr. 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 Plan was made to complete neurology evaluation (EMG) for neuropathy and return to discuss further NPH treatment (possible VPS placement).  6/14/24 follow up visit Discussed VPS placement.  She underwent elective VPS placement. Post op hospital stay was uneventful and she was discharged to home on 7/26/24.  8/5/24 post op visit Today she reports markedly improving cognitive function and slow gait improvement and denies headache, dizziness, n/v, fever/chills, weakness or any other focal neuro deficits. staples removed today without complication Plan was made to repeat CTH wo in one month and follow up after

## 2024-08-13 PROBLEM — E78.5 HYPERLIPIDEMIA: Status: RESOLVED | Noted: 2024-05-13 | Resolved: 2024-08-13

## 2024-08-13 PROBLEM — Z98.2 STATUS POST VENTRICULOATRIAL SHUNT PLACEMENT: Status: ACTIVE | Noted: 2024-08-04

## 2024-08-13 PROBLEM — Z86.39 HISTORY OF HYPOTHYROIDISM: Status: RESOLVED | Noted: 2024-05-13 | Resolved: 2024-08-13

## 2024-08-13 PROBLEM — I10 HIGH BLOOD PRESSURE: Status: RESOLVED | Noted: 2024-05-13 | Resolved: 2024-08-13

## 2024-08-13 PROBLEM — H40.9 GLAUCOMA: Status: RESOLVED | Noted: 2024-05-13 | Resolved: 2024-08-13

## 2024-08-13 NOTE — REASON FOR VISIT
[Formal Caregiver] : formal caregiver [Family Member] : family member [de-identified] : RIGHT frontal approach for ventrici;loperiotoneal shunt placement for NPH [de-identified] : 7/24/24

## 2024-08-13 NOTE — PHYSICAL EXAM
[General Appearance - Alert] : alert [General Appearance - In No Acute Distress] : in no acute distress [] : normal voice and communication [Longitudinal] : longitudinal [Healing Well] : healing well [No Drainage] : without drainage [Normal Skin] : normal [Impaired Insight] : insight and judgment were intact [Affect] : the affect was normal [FreeTextEntry1] : R frontal head

## 2024-08-13 NOTE — HISTORY OF PRESENT ILLNESS
[FreeTextEntry1] : gait and memory problem for one year [de-identified] : 82 yo F with PMH of HTN, HLD, hypothyroidism who had recent mechanical falls underwent L hip surger in 10/2023. As per patient's son, she started to have significantly worsening memory/cognitive problem as well as markedly difficulty walking (able to stand with walker but cannot walk more than 1-2 steps). Additionally she has been having urinary incontinence over couple of years. She denies headache, dizziness, n/v, visual changes, seizure activity. Recently evaluated by Dr. 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 Plan was made to complete neurology evaluation (EMG) for neuropathy and return to discuss further NPH treatment (possible VPS placement).  6/14/24 follow up visit Discussed VPS placement.  She underwent elective VPS placement. Post op hospital stay was uneventful and she was discharged to home on 7/26/24.  8/5/24 post op visit Today she reports markedly improving cognitive function and slow gait improvement and denies headache, dizziness, n/v, fever/chills, weakness or any other focal neuro deficits. staples removed today without complication Plan was made to repeat CTH wo in one month and follow up after

## 2024-08-30 ENCOUNTER — OUTPATIENT (OUTPATIENT)
Dept: OUTPATIENT SERVICES | Facility: HOSPITAL | Age: 84
LOS: 1 days | End: 2024-08-30
Payer: MEDICARE

## 2024-08-30 ENCOUNTER — APPOINTMENT (OUTPATIENT)
Dept: CT IMAGING | Facility: HOSPITAL | Age: 84
End: 2024-08-30

## 2024-08-30 DIAGNOSIS — Z98.890 OTHER SPECIFIED POSTPROCEDURAL STATES: Chronic | ICD-10-CM

## 2024-08-30 PROCEDURE — 70450 CT HEAD/BRAIN W/O DYE: CPT

## 2024-08-30 PROCEDURE — 70450 CT HEAD/BRAIN W/O DYE: CPT | Mod: 26

## 2024-09-09 ENCOUNTER — APPOINTMENT (OUTPATIENT)
Dept: NEUROSURGERY | Facility: CLINIC | Age: 84
End: 2024-09-09

## 2024-09-17 PROBLEM — Z86.39 HISTORY OF HYPOTHYROIDISM: Status: RESOLVED | Noted: 2024-05-13 | Resolved: 2024-09-17

## 2024-09-17 PROBLEM — E78.5 HYPERLIPIDEMIA: Status: RESOLVED | Noted: 2024-05-13 | Resolved: 2024-09-17

## 2024-09-17 PROBLEM — H40.9 GLAUCOMA: Status: RESOLVED | Noted: 2024-05-13 | Resolved: 2024-09-17

## 2024-09-17 PROBLEM — I10 HIGH BLOOD PRESSURE: Status: RESOLVED | Noted: 2024-05-13 | Resolved: 2024-09-17

## 2024-09-19 ENCOUNTER — APPOINTMENT (OUTPATIENT)
Dept: NEUROSURGERY | Facility: CLINIC | Age: 84
End: 2024-09-19
Payer: MEDICARE

## 2024-09-19 DIAGNOSIS — E78.5 HYPERLIPIDEMIA, UNSPECIFIED: ICD-10-CM

## 2024-09-19 DIAGNOSIS — Z98.2 PRESENCE OF CEREBROSPINAL FLUID DRAINAGE DEVICE: ICD-10-CM

## 2024-09-19 DIAGNOSIS — I10 ESSENTIAL (PRIMARY) HYPERTENSION: ICD-10-CM

## 2024-09-19 DIAGNOSIS — G91.2 (IDIOPATHIC) NORMAL PRESSURE HYDROCEPHALUS: ICD-10-CM

## 2024-09-19 DIAGNOSIS — H40.9 UNSPECIFIED GLAUCOMA: ICD-10-CM

## 2024-09-19 DIAGNOSIS — Z86.39 PERSONAL HISTORY OF OTHER ENDOCRINE, NUTRITIONAL AND METABOLIC DISEASE: ICD-10-CM

## 2024-09-19 PROCEDURE — 62252 CSF SHUNT REPROGRAM: CPT

## 2024-09-19 PROCEDURE — 99024 POSTOP FOLLOW-UP VISIT: CPT

## 2024-09-19 NOTE — PHYSICAL EXAM
[General Appearance - Alert] : alert [General Appearance - In No Acute Distress] : in no acute distress [General Appearance - Well-Appearing] : healthy appearing [Affect] : the affect was normal [Motor Tone] : muscle tone was normal in all four extremities [Limited Balance] : the patient's balance was impaired

## 2024-09-19 NOTE — DATA REVIEWED
[de-identified] : head wo on 8/30/24 in PACS ACC: [id number]     EXAM:  CT BRAIN   ORDERED BY: ELADIA MAJANO  PROCEDURE DATE:  08/30/2024    INTERPRETATION:  CLINICAL INFORMATION: to evaluate NPH;  COMPARISON: CT the head performed on 7/24/2024.  CONTRAST: IV Contrast: NONE Complications: None reported at time of study completion  Technique: CT head was performed utilizing axial images from the base of the skull through the vertex without the administration of intravenous contrast. Images were reviewed in the bone, brain and subdural windows.  Findings:  Again noted is a right ventricular shunt catheter coursing via a right frontal jaycee hole with its tip through the foramen of Britton and within the anterior third ventricle, unchanged. The ventricles are  There is no evidence of acute intracranial hemorrhage or acute transcortical infarct. There are patchy areas of low density within the periventricular white matter extending into the internal and external capsules consistent with microvascular disease, unchanged. The ventricles are unchanged in size.. There is no evidence of mass-effect or midline shift. There is no evidence of an intra or extra-axial fluid collection.  Visualized paranasal sinuses and bilateral mastoid air cells are clear.  Impression: Right frontal ventricular shunt catheter, unchanged. Ventricles are unchanged in size..    --- End of Report ---      AKIKO LAY MD; Attending Radiologist This document has been electronically signed. Aug 30 2024 12:27PM

## 2024-09-19 NOTE — HISTORY OF PRESENT ILLNESS
[de-identified] : 82 yo F with PMH of HTN, HLD, hypothyroidism who had recent mechanical falls underwent L hip surger in 10/2023. As per patient's son, she started to have significantly worsening memory/cognitive problem as well as markedly difficulty walking (able to stand with walker but cannot walk more than 1-2 steps). Additionally she has been having urinary incontinence over couple of years. She denies headache, dizziness, n/v, visual changes, seizure activity. Recently evaluated by Dr. 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 Plan was made to complete neurology evaluation (EMG) for neuropathy and return to discuss further NPH treatment (possible VPS placement).  6/14/24 follow up visit Discussed VPS placement.  She underwent elective VPS placement. Post op hospital stay was uneventful and she was discharged to home on 7/26/24.  8/5/24 post op visit Today she reports markedly improving cognitive function and slow gait improvement and denies headache, dizziness, n/v, fever/chills, weakness or any other focal neuro deficits. staples removed today without complication Plan was made to repeat CTH wo in one month and follow up after [FreeTextEntry1] : Today her son reports past couple weeks of cognitive regression which has not been as severe as preop but noticed some changes otherwise denies any other new/worsening focal neuro deficits.

## 2024-09-19 NOTE — DATA REVIEWED
[de-identified] : head wo on 8/30/24 in PACS ACC: [id number]     EXAM:  CT BRAIN   ORDERED BY: ELADIA MAJANO  PROCEDURE DATE:  08/30/2024    INTERPRETATION:  CLINICAL INFORMATION: to evaluate NPH;  COMPARISON: CT the head performed on 7/24/2024.  CONTRAST: IV Contrast: NONE Complications: None reported at time of study completion  Technique: CT head was performed utilizing axial images from the base of the skull through the vertex without the administration of intravenous contrast. Images were reviewed in the bone, brain and subdural windows.  Findings:  Again noted is a right ventricular shunt catheter coursing via a right frontal jaycee hole with its tip through the foramen of Britton and within the anterior third ventricle, unchanged. The ventricles are  There is no evidence of acute intracranial hemorrhage or acute transcortical infarct. There are patchy areas of low density within the periventricular white matter extending into the internal and external capsules consistent with microvascular disease, unchanged. The ventricles are unchanged in size.. There is no evidence of mass-effect or midline shift. There is no evidence of an intra or extra-axial fluid collection.  Visualized paranasal sinuses and bilateral mastoid air cells are clear.  Impression: Right frontal ventricular shunt catheter, unchanged. Ventricles are unchanged in size..    --- End of Report ---      AKIKO LAY MD; Attending Radiologist This document has been electronically signed. Aug 30 2024 12:27PM

## 2024-09-19 NOTE — DATA REVIEWED
[de-identified] : head wo on 8/30/24 in PACS ACC: [id number]     EXAM:  CT BRAIN   ORDERED BY: ELADIA MAJANO  PROCEDURE DATE:  08/30/2024    INTERPRETATION:  CLINICAL INFORMATION: to evaluate NPH;  COMPARISON: CT the head performed on 7/24/2024.  CONTRAST: IV Contrast: NONE Complications: None reported at time of study completion  Technique: CT head was performed utilizing axial images from the base of the skull through the vertex without the administration of intravenous contrast. Images were reviewed in the bone, brain and subdural windows.  Findings:  Again noted is a right ventricular shunt catheter coursing via a right frontal jaycee hole with its tip through the foramen of Britton and within the anterior third ventricle, unchanged. The ventricles are  There is no evidence of acute intracranial hemorrhage or acute transcortical infarct. There are patchy areas of low density within the periventricular white matter extending into the internal and external capsules consistent with microvascular disease, unchanged. The ventricles are unchanged in size.. There is no evidence of mass-effect or midline shift. There is no evidence of an intra or extra-axial fluid collection.  Visualized paranasal sinuses and bilateral mastoid air cells are clear.  Impression: Right frontal ventricular shunt catheter, unchanged. Ventricles are unchanged in size..    --- End of Report ---      AKIKO LAY MD; Attending Radiologist This document has been electronically signed. Aug 30 2024 12:27PM

## 2024-09-19 NOTE — HISTORY OF PRESENT ILLNESS
[de-identified] : 84 yo F with PMH of HTN, HLD, hypothyroidism who had recent mechanical falls underwent L hip surger in 10/2023. As per patient's son, she started to have significantly worsening memory/cognitive problem as well as markedly difficulty walking (able to stand with walker but cannot walk more than 1-2 steps). Additionally she has been having urinary incontinence over couple of years. She denies headache, dizziness, n/v, visual changes, seizure activity. Recently evaluated by Dr. 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 Plan was made to complete neurology evaluation (EMG) for neuropathy and return to discuss further NPH treatment (possible VPS placement).  6/14/24 follow up visit Discussed VPS placement.  She underwent elective VPS placement. Post op hospital stay was uneventful and she was discharged to home on 7/26/24.  8/5/24 post op visit Today she reports markedly improving cognitive function and slow gait improvement and denies headache, dizziness, n/v, fever/chills, weakness or any other focal neuro deficits. staples removed today without complication Plan was made to repeat CTH wo in one month and follow up after [FreeTextEntry1] : Today her son reports past couple weeks of cognitive regression which has not been as severe as preop but noticed some changes otherwise denies any other new/worsening focal neuro deficits.

## 2024-09-19 NOTE — ASSESSMENT
[FreeTextEntry1] : shunt setting checked at 6.0 today, dialed down to 5.0 given recurrent symptoms.   PLAN - repeat CTH wo in one month - f/u after images to review/reassess symptoms  I, Dr. Abhinav Reyes, personally performed the evaluation and management (E/M) services for this established patient who presents today with (a) new problem(s)/exacerbation of (an) existing condition(s). That E/M includes conducting the clinically appropriate interval history &/or exam, assessing all new/exacerbated conditions, and establishing a new plan of care. Today, my LENA, Hyunchu Marga-Gold, was here to observe my evaluation and management service for this new problem/exacerbated condition and follow the plan of care established by me going forward.

## 2024-09-19 NOTE — REASON FOR VISIT
[Formal Caregiver] : formal caregiver [Family Member] : family member [de-identified] : RIGHT frontal approach for ventrici;loperiotoneal shunt placement for NPH (Certas @ 6.0) [de-identified] : 7/24/24

## 2024-09-19 NOTE — HISTORY OF PRESENT ILLNESS
[de-identified] : 84 yo F with PMH of HTN, HLD, hypothyroidism who had recent mechanical falls underwent L hip surger in 10/2023. As per patient's son, she started to have significantly worsening memory/cognitive problem as well as markedly difficulty walking (able to stand with walker but cannot walk more than 1-2 steps). Additionally she has been having urinary incontinence over couple of years. She denies headache, dizziness, n/v, visual changes, seizure activity. Recently evaluated by Dr. 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 Plan was made to complete neurology evaluation (EMG) for neuropathy and return to discuss further NPH treatment (possible VPS placement).  6/14/24 follow up visit Discussed VPS placement.  She underwent elective VPS placement. Post op hospital stay was uneventful and she was discharged to home on 7/26/24.  8/5/24 post op visit Today she reports markedly improving cognitive function and slow gait improvement and denies headache, dizziness, n/v, fever/chills, weakness or any other focal neuro deficits. staples removed today without complication Plan was made to repeat CTH wo in one month and follow up after [FreeTextEntry1] : Today her son reports past couple weeks of cognitive regression which has not been as severe as preop but noticed some changes otherwise denies any other new/worsening focal neuro deficits.

## 2024-09-19 NOTE — REASON FOR VISIT
[Formal Caregiver] : formal caregiver [Family Member] : family member [de-identified] : RIGHT frontal approach for ventrici;loperiotoneal shunt placement for NPH (Certas @ 6.0) [de-identified] : 7/24/24

## 2024-09-20 NOTE — PROCEDURE
[FreeTextEntry1] : Reprogram  shunt valve [FreeTextEntry2] : Hydrocephalus [FreeTextEntry3] : Certas shunt valve setting initially at 6.0. Magnetic reset tool positioned over valve; setting dialed to 5.0. Patient tolerated well.

## 2024-11-27 ENCOUNTER — NON-APPOINTMENT (OUTPATIENT)
Age: 84
End: 2024-11-27

## 2024-11-27 ENCOUNTER — APPOINTMENT (OUTPATIENT)
Dept: OPHTHALMOLOGY | Facility: CLINIC | Age: 84
End: 2024-11-27
Payer: MEDICARE

## 2024-11-27 PROCEDURE — 92133 CPTRZD OPH DX IMG PST SGM ON: CPT

## 2024-11-27 PROCEDURE — 92004 COMPRE OPH EXAM NEW PT 1/>: CPT

## 2024-12-06 ENCOUNTER — APPOINTMENT (OUTPATIENT)
Dept: OPHTHALMOLOGY | Facility: CLINIC | Age: 84
End: 2024-12-06

## 2024-12-20 ENCOUNTER — EMERGENCY (EMERGENCY)
Facility: HOSPITAL | Age: 84
LOS: 1 days | Discharge: HOME | End: 2024-12-20
Attending: EMERGENCY MEDICINE | Admitting: EMERGENCY MEDICINE
Payer: MEDICARE

## 2024-12-20 VITALS
HEART RATE: 86 BPM | TEMPERATURE: 98 F | DIASTOLIC BLOOD PRESSURE: 86 MMHG | RESPIRATION RATE: 18 BRPM | SYSTOLIC BLOOD PRESSURE: 142 MMHG | OXYGEN SATURATION: 96 %

## 2024-12-20 VITALS
RESPIRATION RATE: 18 BRPM | SYSTOLIC BLOOD PRESSURE: 172 MMHG | TEMPERATURE: 98 F | HEART RATE: 82 BPM | OXYGEN SATURATION: 96 % | DIASTOLIC BLOOD PRESSURE: 95 MMHG | WEIGHT: 119.93 LBS

## 2024-12-20 DIAGNOSIS — Z98.890 OTHER SPECIFIED POSTPROCEDURAL STATES: Chronic | ICD-10-CM

## 2024-12-20 DIAGNOSIS — I10 ESSENTIAL (PRIMARY) HYPERTENSION: ICD-10-CM

## 2024-12-20 DIAGNOSIS — R10.31 RIGHT LOWER QUADRANT PAIN: ICD-10-CM

## 2024-12-20 DIAGNOSIS — E03.9 HYPOTHYROIDISM, UNSPECIFIED: ICD-10-CM

## 2024-12-20 DIAGNOSIS — R11.2 NAUSEA WITH VOMITING, UNSPECIFIED: ICD-10-CM

## 2024-12-20 DIAGNOSIS — E78.5 HYPERLIPIDEMIA, UNSPECIFIED: ICD-10-CM

## 2024-12-20 LAB
ALBUMIN SERPL ELPH-MCNC: 3.5 G/DL — SIGNIFICANT CHANGE UP (ref 3.3–5)
ALP SERPL-CCNC: 89 U/L — SIGNIFICANT CHANGE UP (ref 40–120)
ALT FLD-CCNC: 21 U/L — SIGNIFICANT CHANGE UP (ref 10–45)
ANION GAP SERPL CALC-SCNC: 8 MMOL/L — SIGNIFICANT CHANGE UP (ref 5–17)
APPEARANCE UR: CLEAR — SIGNIFICANT CHANGE UP
AST SERPL-CCNC: SIGNIFICANT CHANGE UP (ref 10–40)
BASOPHILS # BLD AUTO: 0.08 K/UL — SIGNIFICANT CHANGE UP (ref 0–0.2)
BASOPHILS NFR BLD AUTO: 1 % — SIGNIFICANT CHANGE UP (ref 0–2)
BILIRUB SERPL-MCNC: 0.5 MG/DL — SIGNIFICANT CHANGE UP (ref 0.2–1.2)
BILIRUB UR-MCNC: NEGATIVE — SIGNIFICANT CHANGE UP
BUN SERPL-MCNC: 10 MG/DL — SIGNIFICANT CHANGE UP (ref 7–23)
CALCIUM SERPL-MCNC: 9.7 MG/DL — SIGNIFICANT CHANGE UP (ref 8.4–10.5)
CHLORIDE SERPL-SCNC: 107 MMOL/L — SIGNIFICANT CHANGE UP (ref 96–108)
CO2 SERPL-SCNC: 24 MMOL/L — SIGNIFICANT CHANGE UP (ref 22–31)
COLOR SPEC: YELLOW — SIGNIFICANT CHANGE UP
CREAT SERPL-MCNC: 0.43 MG/DL — LOW (ref 0.5–1.3)
DIFF PNL FLD: ABNORMAL
EGFR: 96 ML/MIN/1.73M2 — SIGNIFICANT CHANGE UP
EOSINOPHIL # BLD AUTO: 0.06 K/UL — SIGNIFICANT CHANGE UP (ref 0–0.5)
EOSINOPHIL NFR BLD AUTO: 0.8 % — SIGNIFICANT CHANGE UP (ref 0–6)
GLUCOSE SERPL-MCNC: 152 MG/DL — HIGH (ref 70–99)
GLUCOSE UR QL: NEGATIVE MG/DL — SIGNIFICANT CHANGE UP
HCT VFR BLD CALC: 42.4 % — SIGNIFICANT CHANGE UP (ref 34.5–45)
HGB BLD-MCNC: 13.9 G/DL — SIGNIFICANT CHANGE UP (ref 11.5–15.5)
IMM GRANULOCYTES NFR BLD AUTO: 0.5 % — SIGNIFICANT CHANGE UP (ref 0–0.9)
KETONES UR-MCNC: NEGATIVE MG/DL — SIGNIFICANT CHANGE UP
LACTATE SERPL-SCNC: 1.6 MMOL/L — SIGNIFICANT CHANGE UP (ref 0.5–2)
LEUKOCYTE ESTERASE UR-ACNC: ABNORMAL
LIDOCAIN IGE QN: 7 U/L — SIGNIFICANT CHANGE UP (ref 7–60)
LYMPHOCYTES # BLD AUTO: 2.79 K/UL — SIGNIFICANT CHANGE UP (ref 1–3.3)
LYMPHOCYTES # BLD AUTO: 34.9 % — SIGNIFICANT CHANGE UP (ref 13–44)
MCHC RBC-ENTMCNC: 28.1 PG — SIGNIFICANT CHANGE UP (ref 27–34)
MCHC RBC-ENTMCNC: 32.8 G/DL — SIGNIFICANT CHANGE UP (ref 32–36)
MCV RBC AUTO: 85.7 FL — SIGNIFICANT CHANGE UP (ref 80–100)
MONOCYTES # BLD AUTO: 0.51 K/UL — SIGNIFICANT CHANGE UP (ref 0–0.9)
MONOCYTES NFR BLD AUTO: 6.4 % — SIGNIFICANT CHANGE UP (ref 2–14)
NEUTROPHILS # BLD AUTO: 4.51 K/UL — SIGNIFICANT CHANGE UP (ref 1.8–7.4)
NEUTROPHILS NFR BLD AUTO: 56.4 % — SIGNIFICANT CHANGE UP (ref 43–77)
NITRITE UR-MCNC: NEGATIVE — SIGNIFICANT CHANGE UP
NRBC # BLD: 0 /100 WBCS — SIGNIFICANT CHANGE UP (ref 0–0)
PH UR: 8.5 (ref 5–8)
PLATELET # BLD AUTO: 229 K/UL — SIGNIFICANT CHANGE UP (ref 150–400)
POTASSIUM SERPL-MCNC: 4.5 MMOL/L — SIGNIFICANT CHANGE UP (ref 3.5–5.3)
POTASSIUM SERPL-SCNC: 4.5 MMOL/L — SIGNIFICANT CHANGE UP (ref 3.5–5.3)
PROT SERPL-MCNC: 7.9 G/DL — SIGNIFICANT CHANGE UP (ref 6–8.3)
PROT UR-MCNC: NEGATIVE MG/DL — SIGNIFICANT CHANGE UP
RBC # BLD: 4.95 M/UL — SIGNIFICANT CHANGE UP (ref 3.8–5.2)
RBC # FLD: 14.6 % — HIGH (ref 10.3–14.5)
SODIUM SERPL-SCNC: 139 MMOL/L — SIGNIFICANT CHANGE UP (ref 135–145)
SP GR SPEC: >1.03 — HIGH (ref 1–1.03)
UROBILINOGEN FLD QL: 0.2 MG/DL — SIGNIFICANT CHANGE UP (ref 0.2–1)
WBC # BLD: 7.99 K/UL — SIGNIFICANT CHANGE UP (ref 3.8–10.5)
WBC # FLD AUTO: 7.99 K/UL — SIGNIFICANT CHANGE UP (ref 3.8–10.5)

## 2024-12-20 PROCEDURE — 83690 ASSAY OF LIPASE: CPT

## 2024-12-20 PROCEDURE — 87086 URINE CULTURE/COLONY COUNT: CPT

## 2024-12-20 PROCEDURE — 80053 COMPREHEN METABOLIC PANEL: CPT

## 2024-12-20 PROCEDURE — 74177 CT ABD & PELVIS W/CONTRAST: CPT | Mod: 26,MC

## 2024-12-20 PROCEDURE — 36415 COLL VENOUS BLD VENIPUNCTURE: CPT

## 2024-12-20 PROCEDURE — 99285 EMERGENCY DEPT VISIT HI MDM: CPT

## 2024-12-20 PROCEDURE — 99284 EMERGENCY DEPT VISIT MOD MDM: CPT | Mod: 25

## 2024-12-20 PROCEDURE — 74177 CT ABD & PELVIS W/CONTRAST: CPT | Mod: MC

## 2024-12-20 PROCEDURE — 83605 ASSAY OF LACTIC ACID: CPT

## 2024-12-20 PROCEDURE — 85025 COMPLETE CBC W/AUTO DIFF WBC: CPT

## 2024-12-20 PROCEDURE — 81001 URINALYSIS AUTO W/SCOPE: CPT

## 2024-12-20 RX ORDER — CEPHALEXIN 500 MG
1 CAPSULE ORAL
Qty: 15 | Refills: 0
Start: 2024-12-20 | End: 2024-12-24

## 2024-12-20 NOTE — ED ADULT NURSE NOTE - NSFALLRISKINTERV_ED_ALL_ED

## 2024-12-20 NOTE — ED ADULT NURSE REASSESSMENT NOTE - NS ED NURSE REASSESS COMMENT FT1
CT scan resulted.  VORB Dr. Rodriguez patient may eat.  Food and fluids PO tolerated well. Vital signs stable. Primafit in place for comfort;  UA UC pending.

## 2024-12-20 NOTE — ED PROVIDER NOTE - CLINICAL SUMMARY MEDICAL DECISION MAKING FREE TEXT BOX
Pt with s/s noted above.  TTP RLQ.  Stable neuro status.  Concern for occult and more subacute abdominal process, e.g. appendicitis, stone, constipation or mass.  Labs, urine, CT noted. ? related to UTI ? related to intermittent constipation.  Plan culture, emperic tx and close outpt fu, increase dietary fiber.

## 2024-12-20 NOTE — ED ADULT NURSE NOTE - NSICDXPASTMEDICALHX_GEN_ALL_CORE_FT
PAST MEDICAL HISTORY:  Chronic UTI     Frequent falls     H/O osteoporosis     H/O: depression     History of inflammatory arthritis B/L Hands    Hypothyroidism

## 2024-12-20 NOTE — ED PROVIDER NOTE - OBJECTIVE STATEMENT
84 [place] F with hx HTN HLD Hypothyroid NPH s/p recent  shunt 3 mo ago now with 2 weeks of RLQ pain, mild, intermittent with 1 episode of NV today prompting family to bring patient here. Denies headache, no change in mental status.  Patient and family deny fever, chills, urinary symptoms, diarrhea.  Occasional constipation.

## 2024-12-20 NOTE — ED PROVIDER NOTE - PATIENT PORTAL LINK FT
You can access the FollowMyHealth Patient Portal offered by [facility] by registering at the following website: [email or web address]. By joining [facility]’s FollowMyHealth portal, you will also be able to view your health information using other applications (apps) compatible with our system.

## 2024-12-20 NOTE — ED PROVIDER NOTE - NSFOLLOWUPINSTRUCTIONS_ED_ALL_ED_FT
Abdominal Pain    Many things can cause abdominal pain. Many times, abdominal pain is not caused by a disease and will improve without treatment. Your health care provider will do a physical exam to determine if there is a dangerous cause of your pain; blood tests and imaging may help determine the cause of your pain. However, in many cases, no cause may be found and you may need further testing as an outpatient. Monitor your abdominal pain for any changes.     SEEK IMMEDIATE MEDICAL CARE IF YOU HAVE ANY OF THE FOLLOWING SYMPTOMS: worsening abdominal pain, uncontrollable vomiting, profuse diarrhea, inability to have bowel movements or pass gas, black or bloody stools, fever accompanying chest pain or back pain, or fainting. These symptoms may represent a serious problem that is an emergency. Do not wait to see if the symptoms will go away. Get medical help right away. Call 911 and do not drive yourself to the hospital.     FOLLOW UP CLOSELY WITH YOUR DOCTOR IN 1-2 WEEKS

## 2024-12-20 NOTE — ED ADULT NURSE REASSESSMENT NOTE - NS ED NURSE REASSESS COMMENT FT1
Patient a/o X 3, c/o of right sided abdominal pain, dull and aching, no nausea/vomitting.  Patient baseline mental status.  Vital signs stable.  Right FA PIV #18 in place, all labs sent,no complications. NPO observed.  CT scan pending.  Stable and comfortable.

## 2024-12-20 NOTE — ED ADULT NURSE NOTE - NSICDXPASTSURGICALHX_GEN_ALL_CORE_FT
PAST SURGICAL HISTORY:  History of ankle surgery RIGHT    History of open reduction and internal fixation (ORIF) procedure (L) Hip - 9/2023

## 2024-12-20 NOTE — ED ADULT TRIAGE NOTE - CHIEF COMPLAINT QUOTE
" I have left sided abd pain." Disoriented to time, oriented to self/place/event. Brought by family. pt had cerebral shunt baseline 2 month ago. reports +nausea +abd pain. Wheelchair bound. At mental baseline per family report

## 2024-12-20 NOTE — ED ADULT NURSE NOTE - OBJECTIVE STATEMENT
Patient c/o of right sided abdominal pain w/ nausea, no vomitting, no diarrhea, had Hx of cerebtal shunt 2 months ago, sent by PMD for CT scan.  At baseline oriented to self, WC bound.

## 2024-12-20 NOTE — ED PROVIDER NOTE - PHYSICAL EXAMINATION
Elderly comfortable F in NAD, fully awake and oriented at baseline MS per family, appropriately answering questions  Pupils symmetric, round reactive 4mm bl  MMM  Soft belly, mild ttp RLQ, no r/g, no CVAT  Neuro with 5/5 bl UE and LE strength, CN2-12 intact, nl coordination and speech  Nl skin  LE w/o edema or ttp

## 2024-12-20 NOTE — ED ADULT NURSE REASSESSMENT NOTE - NS ED NURSE REASSESS COMMENT FT1
All labs, CT scan resulted, food and fluids PO tolerated well.  Vital signs stable. Discharged to home in stable condition. No abdominal pain or any new symptom complaint.

## 2025-01-09 ENCOUNTER — NON-APPOINTMENT (OUTPATIENT)
Age: 85
End: 2025-01-09

## 2025-01-09 ENCOUNTER — APPOINTMENT (OUTPATIENT)
Dept: OPHTHALMOLOGY | Facility: CLINIC | Age: 85
End: 2025-01-09
Payer: MEDICARE

## 2025-01-09 PROCEDURE — 92014 COMPRE OPH EXAM EST PT 1/>: CPT

## 2025-02-13 ENCOUNTER — APPOINTMENT (OUTPATIENT)
Dept: OPHTHALMOLOGY | Facility: CLINIC | Age: 85
End: 2025-02-13

## 2025-03-27 NOTE — ED ADULT NURSE NOTE - PAIN: DESCRIPTION (FREQUENCY/QUALITY)
V2.0  Discharge Summary    Name:  Shoaib Rothman /Age/Sex: 1961 (63 y.o. female)   Admit Date: 3/19/2025  Discharge Date: 3/27/25    MRN & CSN:  [id number] & [id number] Encounter Date and Time 3/27/25 11:53 AM EDT    Attending:  Wendy Mcdaniel MD Discharging Provider: DAREK Degroot - CNP       Hospital Course:     Brief HPI: Shoaib Rothman is a 63 y.o. 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  Pt states she is normally on 3-10 LPM at baseline, 10 LPM with exertion. She was requiring 15 LPM/Vapo therm and is now down to 12 LPM/Vapo therm.   Initially Continued on Vapo-therm, bronchodilator therapy, steroids; NEG Influenza/COVID.  IV lasix in ED. IV Lasix continued BID with monitoring of diuresis. IV ABX were started (Vanc/Merrem).  (3/19); Vanco was discontinued on 3/23 after MRSA nasal negative ;         Subjective: anxious about her biopsy; c/o 5/10 right flank pain; on 4L O2 via NC; influenza +      Assessment and Recommendations:     Acute on Chronic Respiratory Failure with Hypoxia and Hypercarbia  End-Stage COPD, bronchiectasis with recurrent mucous plugging  HCAP; RIGHT lower lobe (present on admit)  Emphysema  H/O recurrent mucous plugging requiring bronchoscopy  Previous bronchoscopy over the past 3 months (last admit 25)                 CT Chest was consistent with  Mucous plugging with occluded right middle and lower lobe bronchi.   Moderate secretions in right main bronchus and right bronchus intermedius.      She is back down to her baseline 4 L of oxygen via nasal cannula-titrate O2 to keep sats between 88 to 92%      
intermittent

## (undated) DEVICE — VENODYNE/SCD SLEEVE CALF MEDIUM

## (undated) DEVICE — SYR ASEPTO

## (undated) DEVICE — MARKING PEN W RULER

## (undated) DEVICE — HOOD FLYTE STRYKER HELMET SHIELD

## (undated) DEVICE — WARMING BLANKET UPPER ADULT

## (undated) DEVICE — DRAPE TOWEL BLUE 17" X 24"

## (undated) DEVICE — DRAPE 3/4 SHEET 52X76"

## (undated) DEVICE — SUT ETHIBOND 1 30" OS6

## (undated) DEVICE — GOWN TRIMAX XXL

## (undated) DEVICE — DRSG COBAN 6"

## (undated) DEVICE — DRSG STOCKINETTE IMPERVIOUS XL

## (undated) DEVICE — GLV 8.5 PROTEXIS (WHITE)

## (undated) DEVICE — PACK TOTAL HIP

## (undated) DEVICE — SUT HEWSON RETRIEVER

## (undated) DEVICE — DRAPE LIGHT HANDLE COVER (BLUE)

## (undated) DEVICE — SUT QUILL MONODERM 0 20CM 26MM